# Patient Record
Sex: FEMALE | Race: WHITE | NOT HISPANIC OR LATINO | Employment: OTHER | URBAN - NONMETROPOLITAN AREA
[De-identification: names, ages, dates, MRNs, and addresses within clinical notes are randomized per-mention and may not be internally consistent; named-entity substitution may affect disease eponyms.]

---

## 2017-01-03 ENCOUNTER — OFFICE VISIT (OUTPATIENT)
Dept: OBSTETRICS AND GYNECOLOGY | Facility: CLINIC | Age: 72
End: 2017-01-03

## 2017-01-03 VITALS
WEIGHT: 109 LBS | BODY MASS INDEX: 21.4 KG/M2 | SYSTOLIC BLOOD PRESSURE: 124 MMHG | HEIGHT: 60 IN | DIASTOLIC BLOOD PRESSURE: 72 MMHG

## 2017-01-03 DIAGNOSIS — Z78.9 NONSMOKER: ICD-10-CM

## 2017-01-03 DIAGNOSIS — Z09 S/P GYNECOLOGICAL SURGERY, FOLLOW-UP EXAM: Primary | ICD-10-CM

## 2017-01-03 PROCEDURE — 99024 POSTOP FOLLOW-UP VISIT: CPT | Performed by: OBSTETRICS & GYNECOLOGY

## 2017-01-03 NOTE — MR AVS SNAPSHOT
Sarah Espinoza   1/3/2017 11:30 AM   Office Visit    Dept Phone:  [phone]   Encounter #:  [id number]    Provider:  Danii Patricia MD   Department:  [facility] OB GYN                Your Full Care Plan              Your Updated Medication List          This list is accurate as of: 1/3/17 11:44 AM.  Always use your most recent med list.                ALEVE PO       aspirin 81 MG chewable tablet   Chew 1 tablet Daily.       atorvastatin 20 MG tablet   Commonly known as:  LIPITOR   Take 1 tablet by mouth Every Night. Additional refills from Dr. Hernandez       Biotin 1000 MCG tablet       COLLAGEN PO       HYDROcodone-acetaminophen 5-325 MG per tablet   Commonly known as:  NORCO   Take 1-2 tablets by mouth Every 6 (Six) Hours As Needed for moderate pain (4-6).       lisinopril-hydrochlorothiazide 20-25 MG per tablet   Commonly known as:  PRINZIDE,ZESTORETIC       loratadine 10 MG tablet   Commonly known as:  CLARITIN       metoprolol succinate XL 50 MG 24 hr tablet   Commonly known as:  TOPROL-XL       ondansetron 4 MG tablet   Commonly known as:  ZOFRAN   Take 1 tablet by mouth Every 6 (Six) Hours As Needed for nausea.       promethazine 12.5 MG tablet   Commonly known as:  PHENERGAN   Take 1 tablet by mouth Every 6 (Six) Hours As Needed for nausea or vomiting.       vitamin D3 5000 UNITS capsule capsule               You Were Diagnosed With        Codes Comments    S/P gynecological surgery, follow-up exam    -  Primary ICD-10-CM: Z09  ICD-9-CM: V67.09     Nonsmoker     ICD-10-CM: Z78.9  ICD-9-CM: V49.89       Instructions     None    Patient Instructions History      Upcoming Appointments     Visit Type Date Time Department    OFFICE VISIT 1/3/2017 11:30 AM [facility]     PAD CARD NM INJ 1/9/2017  8:45 AM  PAD CARDIOLOGY    [facility] PAD CARD NM SCAN 1/9/2017 10:00 AM  PAD CARDIOLOGY     PAD CARD NM STRESS 1/9/2017 10:45 AM  PAD CARDIOLOGY     PAD CARD NM  SCAN 2017 11:30 AM  [facility] CARDIOLOGY    HOSPITAL FOLLOW UP 2017  1:30 PM [facility] PAD    OFFICE VISIT 2017  9:30 AM [facility] [place]    FOLLOW UP 10/9/2017  8:30 AM [facility]ART [facility] PAD      MyChart Signup     [facility] [employer] allows you to send messages to your doctor, view your test results, renew your prescriptions, schedule appointments, and more. To sign up, go to Promolta and click on the Sign Up Now link in the New User? box. Enter your [employer] Activation Code exactly as it appears below along with the last four digits of your Social Security Number and your Date of Birth () to complete the sign-up process. If you do not sign up before the expiration date, you must request a new code.    [employer] Activation Code: [email or web address]  Expires: 2017 11:44 AM    If you have questions, you can email Sydney [email or web address] or call [phone] to talk to our [employer] staff. Remember, [employer] is NOT to be used for urgent needs. For medical emergencies, dial 911.               Other Info from Your Visit           Your Appointments     2017  8:45 AM CST   (Arrive by 8:15 AM CST)    PAD CARD NM INJ with PAD NM INJ ROOM   [facility] CARDIOLOGY ([place])    [address] [place] [id number] [phone]           No food or drink after midnight prior to your test. May have only a sip of water with allowed medications. (Please see list below of meds not allowed) No caffeine or chocolate 24 hours prior to you test. (this includes coffee, tea, soda, all decaffeinated beverages, cappuccino flavorings, noooz or vivarian, diet medications, excedrin, anacin or any energy drinks) Wear comfortable clothing and walking shoes. Bring your insurance cards with you. Bring all medications in their original bottles. If you are diabetic, do not take your diabetic medications after consulting with your primary care physician. If you take insulin, only  "take half the dose after consulting with your primary care physician. Please hold the following medications for 48 hours prior to your test after consulting with your primary care physician (acebutolo, aggrenox, atenolol, bisoprolol, betaxolol, betapace, calan, cardizem, carvadilol, coreg, corgard, corzide, dilacor xr, diltiazem, inderal, inderal la, isoptin, karlone, labetolol, levatol, nadolol, normodyne, penbutolol, pindolol, propanolol, sectral, sotalol, tenoretic, tiazic, timolol, bystolic, toprol xl, lopressor, metoprolol, trandata, verapamil, verelan, visken, zebeta, zisc, theophylline, elva-dur, sio-phyline, qulbron-t, primatene, persantine, dipyrimadiole)       To allow time for registration            Jan 09, 2017 10:00 AM CST    PAD CARD NM SCAN with PAD NM SCAN ROOM   [facility] CARDIOLOGY [address] [address] [phone]            Jan 09, 2017 10:45 AM [facility] PAD CARD NM STRESS [place] with PAD STRESS LAB 1   [address] [address] [phone]            Jan 09, 2017 11:30 AM [facility] PAD CARD NM SCAN with PAD NM SCAN ROOM   [address] [address] [phone]            Jan 23, 2017  1:30 PM CST   Hospital Follow Up with Anant Hernandez MD   [facility]ART [facility] (--)    [address] [place] [address] [phone]              Allergies     Percodan [Oxycodone-aspirin]  Hallucinations    Zofran [Ondansetron Hcl]        Reason for Visit     Post-op pt here today for 3 week post op vaginal hysterectomy and AR/OH. pt voices that 2 days after she came home from hospital pt sodium and potassium was low and was admitted. pt voices that she is feeling well since surgery.       Vital Signs     Blood Pressure Height Weight Body Mass Index Smoking Status       124/72 60\" (152.4 cm) 109 " lb (49.4 kg) 21.29 kg/m2 Never Smoker       Problems and Diagnoses Noted     S/P gynecological surgery, follow-up exam    -  Primary    Nonsmoker

## 2017-01-03 NOTE — PROGRESS NOTES
"Subjective   Chief Complaint   Patient presents with   • Post-op     pt here today for 3 week post op vaginal hysterectomy and AR/OR. pt voices that 2 days after she came home from hospital pt sodium and potassium was low and was admitted. pt voices that she is feeling well since surgery.      Sarah Espinoza is a 71 y.o. year old No obstetric history on file. presenting to be seen for her post-operative visit.  She had a repair of cystocele (AR), repair of rectocele (OR) and TVH.  Currently she reports no problems with eating, bowel movements, voiding, or wound drainage and pain is well controlled.  No pelvic pressure, bladder and bowel function normal.    No Additional Complaints Reported    The following portions of the patient's history were reviewed and updated as appropriate:problem list, current medications and allergies    Review of Systems      Objective   Visit Vitals   • /72   • Ht 60\" (152.4 cm)   • Wt 109 lb (49.4 kg)   • BMI 21.29 kg/m2       General:  well developed; well nourished  no acute distress   Abdomen: soft, non-tender; no masses   Pelvis: Clinical staff was present for exam  :  urethral meatus normal and All sutures intact, no erythema or drainage          Assessment   1. Pt is 2 weeks s/p repair of cystocele (AR), repair of rectocele (OR) and TVH  2. Hospitalization last week for low K+, but feeling better now     Plan   1. RTO in 4 weeks, healing appropriately    No orders of the defined types were placed in this encounter.         This note was electronically signed.    Danii Patricia MD  January 3, 2017  "

## 2017-01-09 ENCOUNTER — HOSPITAL ENCOUNTER (OUTPATIENT)
Dept: CARDIOLOGY | Facility: HOSPITAL | Age: 72
Discharge: HOME | End: 2017-01-09
Admitting: NURSE PRACTITIONER

## 2017-01-09 ENCOUNTER — HOSPITAL ENCOUNTER (OUTPATIENT)
Dept: CARDIOLOGY | Facility: HOSPITAL | Age: 72
Discharge: HOME | End: 2017-01-09

## 2017-01-09 VITALS
WEIGHT: 108 LBS | HEIGHT: 60 IN | HEART RATE: 92 BPM | DIASTOLIC BLOOD PRESSURE: 58 MMHG | SYSTOLIC BLOOD PRESSURE: 103 MMHG | BODY MASS INDEX: 21.2 KG/M2

## 2017-01-09 DIAGNOSIS — I25.119 CORONARY ARTERY DISEASE INVOLVING NATIVE CORONARY ARTERY OF NATIVE HEART WITH ANGINA PECTORIS (HCC): ICD-10-CM

## 2017-01-09 DIAGNOSIS — R00.2 HEART PALPITATIONS: ICD-10-CM

## 2017-01-09 PROCEDURE — 25010000002 AMINOPHYLLINE PER 250 MG: Performed by: INTERNAL MEDICINE

## 2017-01-09 PROCEDURE — A9500 TC99M SESTAMIBI: HCPCS | Performed by: NURSE PRACTITIONER

## 2017-01-09 PROCEDURE — 78452 HT MUSCLE IMAGE SPECT MULT: CPT

## 2017-01-09 PROCEDURE — 78452 HT MUSCLE IMAGE SPECT MULT: CPT | Performed by: INTERNAL MEDICINE

## 2017-01-09 PROCEDURE — 25010000002 REGADENOSON 0.4 MG/5ML SOLUTION: Performed by: INTERNAL MEDICINE

## 2017-01-09 PROCEDURE — 93017 CV STRESS TEST TRACING ONLY: CPT

## 2017-01-09 PROCEDURE — 0 TECHNETIUM SESTAMIBI: Performed by: NURSE PRACTITIONER

## 2017-01-09 PROCEDURE — 93018 CV STRESS TEST I&R ONLY: CPT | Performed by: INTERNAL MEDICINE

## 2017-01-09 RX ORDER — AMINOPHYLLINE DIHYDRATE 25 MG/ML
100 INJECTION, SOLUTION INTRAVENOUS ONCE
Status: COMPLETED | OUTPATIENT
Start: 2017-01-09 | End: 2017-01-09

## 2017-01-09 RX ADMIN — REGADENOSON 0.4 MG: 0.08 INJECTION, SOLUTION INTRAVENOUS at 09:25

## 2017-01-09 RX ADMIN — Medication 1 DOSE: at 08:22

## 2017-01-09 RX ADMIN — AMINOPHYLLINE 100 MG: 25 INJECTION, SOLUTION INTRAVENOUS at 09:31

## 2017-01-10 LAB
BH CV STRESS BP STAGE 1: NORMAL
BH CV STRESS COMMENTS STAGE 1: NORMAL
BH CV STRESS DOSE REGADENOSON STAGE 1: 0.4
BH CV STRESS DURATION MIN STAGE 1: 0
BH CV STRESS DURATION SEC STAGE 1: 10
BH CV STRESS HR STAGE 1: 120
BH CV STRESS PROTOCOL 1: NORMAL
BH CV STRESS RECOVERY BP: NORMAL MMHG
BH CV STRESS RECOVERY HR: 103 BPM
BH CV STRESS STAGE 1: 1
LV EF NUC BP: 89 %
MAXIMAL PREDICTED HEART RATE: 151 BPM
PERCENT MAX PREDICTED HR: 79.47 %
STRESS BASELINE BP: NORMAL MMHG
STRESS BASELINE HR: 99 BPM
STRESS PERCENT HR: 93 %
STRESS POST EXERCISE DUR SEC: 10 SEC
STRESS POST PEAK BP: NORMAL MMHG
STRESS POST PEAK HR: 120 BPM
STRESS TARGET HR: 128 BPM

## 2017-01-23 ENCOUNTER — OFFICE VISIT (OUTPATIENT)
Dept: CARDIOLOGY | Facility: CLINIC | Age: 72
End: 2017-01-23

## 2017-01-23 VITALS
HEART RATE: 83 BPM | WEIGHT: 108.4 LBS | DIASTOLIC BLOOD PRESSURE: 65 MMHG | SYSTOLIC BLOOD PRESSURE: 117 MMHG | BODY MASS INDEX: 21.28 KG/M2 | HEIGHT: 60 IN

## 2017-01-23 DIAGNOSIS — I25.119 CORONARY ARTERY DISEASE INVOLVING NATIVE CORONARY ARTERY OF NATIVE HEART WITH ANGINA PECTORIS (HCC): Primary | ICD-10-CM

## 2017-01-23 DIAGNOSIS — I10 ESSENTIAL HYPERTENSION: ICD-10-CM

## 2017-01-23 PROBLEM — I27.20 PULMONARY HYPERTENSION (HCC): Status: ACTIVE | Noted: 2017-01-23

## 2017-01-23 PROCEDURE — 99213 OFFICE O/P EST LOW 20 MIN: CPT | Performed by: INTERNAL MEDICINE

## 2017-01-23 PROCEDURE — 93000 ELECTROCARDIOGRAM COMPLETE: CPT | Performed by: INTERNAL MEDICINE

## 2017-01-23 NOTE — PROGRESS NOTES
Reason for Visit: cardiovascular follow up.    HPI:  Sarah Espinoza is a 71 y.o. female is here today for hospital follow-up.  She was admitted from 12/19/2016 until 12/22/2016 with hyponatremia, hypokalemia, acute blood loss anemia, and hypertension.  An outpatient nuclear stress was ordered which was done on 01/09/2017 showed no evidence of ischemia.  She continues to deny any chest pain since discharge.  Her strength has slowly been returning.  She denies     Previous Cardiac Testing and Procedures:  - Echocardiogram (12/21/2016) EF D5 percent, grade 1 diastolic dysfunction, RVSP 45-55 mmHg, normal RV size and function  - Nuclear stress (01/09/2017) normal myocardial perfusion with no evidence of ischemia, EF 70%  - Lipid panel (12/20/16) 113/26/69/103    Patient Active Problem List   Diagnosis   • Essential hypertension   • Lupus   • Arthritis   • Female bladder prolapse   • Heart palpitations   • Seasonal allergies   • Uterine prolapse   • Hyponatremia   • CAD (coronary artery disease)   • Hypovolemia   • Anemia due to blood loss, acute   • Pulmonary hypertension       Social History   Substance Use Topics   • Smoking status: Never Smoker   • Smokeless tobacco: Never Used   • Alcohol use No       Family History   Problem Relation Age of Onset   • Heart disease Mother    • No Known Problems Father    • No Known Problems Sister    • Kidney cancer Brother    • Leukemia Sister        The following portions of the patient's history were reviewed and updated as appropriate: allergies, current medications, past family history, past medical history, past social history, past surgical history and problem list.      Current Outpatient Prescriptions:   •  aspirin 81 MG chewable tablet, Chew 1 tablet Daily., Disp: 30 tablet, Rfl: 0  •  Biotin 1000 MCG tablet, Take 1,000 mcg by mouth 3 (Three) Times a Day., Disp: , Rfl:   •  Cholecalciferol (VITAMIN D3) 5000 UNITS capsule capsule, Take 5,000 Units by mouth Daily.,  "Disp: , Rfl:   •  lisinopril-hydrochlorothiazide (PRINZIDE,ZESTORETIC) 20-25 MG per tablet, Take 1 tablet by mouth Daily., Disp: , Rfl:   •  metoprolol succinate XL (TOPROL-XL) 50 MG 24 hr tablet, Take 50 mg by mouth Daily., Disp: , Rfl:   •  COLLAGEN PO, Take 1,000 mg by mouth Daily., Disp: , Rfl:   •  loratadine (CLARITIN) 10 MG tablet, Take 10 mg by mouth Daily., Disp: , Rfl:   •  Naproxen Sodium (ALEVE PO), Take  by mouth As Needed (MILD ARTHRITIC PAIN)., Disp: , Rfl:   •  promethazine (PHENERGAN) 12.5 MG tablet, Take 1 tablet by mouth Every 6 (Six) Hours As Needed for nausea or vomiting., Disp: 30 tablet, Rfl: 0    Review of Systems   Constitution: Negative for chills and fever.   Cardiovascular: Negative for chest pain and paroxysmal nocturnal dyspnea.   Respiratory: Negative for cough and shortness of breath.    Skin: Negative for rash.   Gastrointestinal: Negative for abdominal pain and heartburn.   Neurological: Negative for dizziness and numbness.       Objective   Visit Vitals   • /65 (BP Location: Right arm, Patient Position: Sitting, Cuff Size: Adult)   • Pulse 83   • Ht 60\" (152.4 cm)   • Wt 108 lb 6.4 oz (49.2 kg)   • BMI 21.17 kg/m2     Physical Exam   Constitutional: She is oriented to person, place, and time. She appears well-developed and well-nourished.   HENT:   Head: Normocephalic and atraumatic.   Cardiovascular: Normal rate, regular rhythm and normal heart sounds.    No murmur heard.  Pulmonary/Chest: Effort normal and breath sounds normal.   Musculoskeletal: She exhibits no edema.   Neurological: She is alert and oriented to person, place, and time.   Skin: Skin is warm and dry.   Psychiatric: She has a normal mood and affect.       ECG 12 Lead  Date/Time: 1/23/2017 2:01 PM  Performed by: DAVID POLO  Authorized by: DAVID POLO   Comparison: compared with previous ECG from 12/19/2016  Comparison to previous ECG: PVCs are no longer present  Rhythm: sinus rhythm  Rate: " normal  Clinical impression: normal ECG              ICD-10-CM ICD-9-CM   1. Coronary artery disease involving native coronary artery of native heart with angina pectoris I25.119 414.01     413.9   2. Essential hypertension I10 401.9         Assessment/Plan:  1.  CAD: Mild troponin elevation noted during recent admission for anemia.  Follow-up nuclear stress was negative for ischemia.  Currently managed on aspirin, atorvastatin, and metoprolol.  Will stop atorvastatin given the negative stress test and normal lipid panel.     2.  Essential hypertension: Blood pressure is well controlled on current medical therapy.    3.  Pulmonary hypertension: RVSP 45-55 mmHg on recent echo.  Possibly due to Lupus.  Will continue to monitor.

## 2017-01-23 NOTE — LETTER
January 23, 2017     HARRIS Valladares  [address] [place] [address]    Patient: Sarah Espinoza   YOB: 1945   Date of Visit: 1/23/2017       Dear HARRIS Mendoza:    Sarah Espinoza was in my office today. Below is a copy of my note.    If you have questions, please do not hesitate to call me. I look forward to following [place] along with you.         Sincerely,        Anant Hernandez MD        CC: No Recipients      Reason for Visit: cardiovascular follow up.    HPI:  Sarah Espinoza is a 71 y.o. female is here today for hospital follow-up.  She was admitted from 12/19/2016 until 12/22/2016 with hyponatremia, hypokalemia, acute blood loss anemia, and hypertension.  An outpatient nuclear stress was ordered which was done on 01/09/2017 showed no evidence of ischemia.  She continues to deny any chest pain since discharge.  Her strength has slowly been returning.  She denies     Previous Cardiac Testing and Procedures:  - Echocardiogram (12/21/2016) EF D5 percent, grade 1 diastolic dysfunction, RVSP 45-55 mmHg, normal RV size and function  - Nuclear stress (01/09/2017) normal myocardial perfusion with no evidence of ischemia, EF 70%  - Lipid panel (12/20/16) 113/26/69/103    Patient Active Problem List   Diagnosis   • Essential hypertension   • Lupus   • Arthritis   • Female bladder prolapse   • Heart palpitations   • Seasonal allergies   • Uterine prolapse   • Hyponatremia   • CAD (coronary artery disease)   • Hypovolemia   • Anemia due to blood loss, acute   • Pulmonary hypertension       Social History   Substance Use Topics   • Smoking status: Never Smoker   • Smokeless tobacco: Never Used   • Alcohol use No       Family History   Problem Relation Age of Onset   • Heart disease Mother    • No Known Problems Father    • No Known Problems Sister    • Kidney cancer Brother    • Leukemia Sister        The following portions of the patient's history were reviewed and updated  "as appropriate: allergies, current medications, past family history, past medical history, past social history, past surgical history and problem list.      Current Outpatient Prescriptions:   •  aspirin 81 MG chewable tablet, Chew 1 tablet Daily., Disp: 30 tablet, Rfl: 0  •  Biotin 1000 MCG tablet, Take 1,000 mcg by mouth 3 (Three) Times a Day., Disp: , Rfl:   •  Cholecalciferol (VITAMIN D3) 5000 UNITS capsule capsule, Take 5,000 Units by mouth Daily., Disp: , Rfl:   •  lisinopril-hydrochlorothiazide (PRINZIDE,ZESTORETIC) 20-25 MG per tablet, Take 1 tablet by mouth Daily., Disp: , Rfl:   •  metoprolol succinate XL (TOPROL-XL) 50 MG 24 hr tablet, Take 50 mg by mouth Daily., Disp: , Rfl:   •  COLLAGEN PO, Take 1,000 mg by mouth Daily., Disp: , Rfl:   •  loratadine (CLARITIN) 10 MG tablet, Take 10 mg by mouth Daily., Disp: , Rfl:   •  Naproxen Sodium (ALEVE PO), Take  by mouth As Needed (MILD ARTHRITIC PAIN)., Disp: , Rfl:   •  promethazine (PHENERGAN) 12.5 MG tablet, Take 1 tablet by mouth Every 6 (Six) Hours As Needed for nausea or vomiting., Disp: 30 tablet, Rfl: 0    Review of Systems   Constitution: Negative for chills and fever.   Cardiovascular: Negative for chest pain and paroxysmal nocturnal dyspnea.   Respiratory: Negative for cough and shortness of breath.    Skin: Negative for rash.   Gastrointestinal: Negative for abdominal pain and heartburn.   Neurological: Negative for dizziness and numbness.       Objective   Visit Vitals   • /65 (BP Location: Right arm, Patient Position: Sitting, Cuff Size: Adult)   • Pulse 83   • Ht 60\" (152.4 cm)   • Wt 108 lb 6.4 oz (49.2 kg)   • BMI 21.17 kg/m2     Physical Exam   Constitutional: She is oriented to person, place, and time. She appears well-developed and well-nourished.   HENT:   Head: Normocephalic and atraumatic.   Cardiovascular: Normal rate, regular rhythm and normal heart sounds.    No murmur heard.  Pulmonary/Chest: Effort normal and breath sounds " normal.   Musculoskeletal: She exhibits no edema.   Neurological: She is alert and oriented to person, place, and time.   Skin: Skin is warm and dry.   Psychiatric: She has a normal mood and affect.       ECG 12 Lead  Date/Time: 1/23/2017 2:01 PM  Performed by: DAVID POLO  Authorized by: DAVID POLO   Comparison: compared with previous ECG from 12/19/2016  Comparison to previous ECG: PVCs are no longer present  Rhythm: sinus rhythm  Rate: normal  Clinical impression: normal ECG              ICD-10-CM ICD-9-CM   1. Coronary artery disease involving native coronary artery of native heart with angina pectoris I25.119 414.01     413.9   2. Essential hypertension I10 401.9         Assessment/Plan:  1.  CAD: Mild troponin elevation noted during recent admission for anemia.  Follow-up nuclear stress was negative for ischemia.  Currently managed on aspirin, atorvastatin, and metoprolol.  Will stop atorvastatin given the negative stress test and normal lipid panel.     2.  Essential hypertension: Blood pressure is well controlled on current medical therapy.    3.  Pulmonary hypertension: RVSP 45-55 mmHg on recent echo.  Possibly due to Lupus.  Will continue to monitor.

## 2017-01-23 NOTE — MR AVS SNAPSHOT
[place] KELSEY Espinoza   1/23/2017 1:30 PM   Office Visit    Dept Phone:  [phone]   Encounter #:  [id number]    Provider:  Anant Hernandez MD   Department:  [facility]                Your Full Care Plan              Today's Medication Changes          These changes are accurate as of: 1/23/17  2:10 PM.  If you have any questions, ask your nurse or doctor.               Stop taking medication(s)listed here:     atorvastatin 20 MG tablet   Commonly known as:  LIPITOR   Stopped by:  Anant Hernandez MD           HYDROcodone-acetaminophen 5-325 MG per tablet   Commonly known as:  NORCO   Stopped by:  Anant Hernandez MD           ondansetron 4 MG tablet   Commonly known as:  ZOFRAN   Stopped by:  Anant Hernandez MD                      Your Updated Medication List          This list is accurate as of: 1/23/17  2:10 PM.  Always use your most recent med list.                ALEVE PO       aspirin 81 MG chewable tablet   Chew 1 tablet Daily.       Biotin 1000 MCG tablet       COLLAGEN PO       lisinopril-hydrochlorothiazide 20-25 MG per tablet   Commonly known as:  PRINZIDE,ZESTORETIC       loratadine 10 MG tablet   Commonly known as:  CLARITIN       metoprolol succinate XL 50 MG 24 hr tablet   Commonly known as:  TOPROL-XL       promethazine 12.5 MG tablet   Commonly known as:  PHENERGAN   Take 1 tablet by mouth Every 6 (Six) Hours As Needed for nausea or vomiting.       vitamin D3 5000 UNITS capsule capsule               We Performed the Following     ECG 12 Lead       You Were Diagnosed With        Codes Comments    Coronary artery disease involving native coronary artery of native heart with angina pectoris    -  Primary ICD-10-CM: I25.119  ICD-9-CM: 414.01, 413.9     Essential hypertension     ICD-10-CM: I10  ICD-9-CM: 401.9       Instructions     None    Patient Instructions History      Upcoming Appointments     Visit Type Date Time Department    HOSPITAL FOLLOW  "UP 2017  1:30 PM [facility]    OFFICE VISIT 2017  9:30 AM [facility]    FOLLOW UP 10/9/2017  8:30 AM [facility] P[facility]      MyChart Signup     [facility] [employer] allows you to send messages to your doctor, view your test results, renew your prescriptions, schedule appointments, and more. To sign up, go to aiHit and click on the Sign Up Now link in the New User? box. Enter your [employer] Activation Code exactly as it appears below along with the last four digits of your Social Security Number and your Date of Birth () to complete the sign-up process. If you do not sign up before the expiration date, you must request a new code.    [employer] Activation Code: [email or web address]  Expires: 2017  2:09 PM    If you have questions, you can email [email or web address] or call [phone] to talk to our [employer] staff. Remember, [employer] is NOT to be used for urgent needs. For medical emergencies, dial 911.               Other Info from Your Visit           Your Appointments     2017  9:30 AM CST   Office Visit with Danii Patricia MD   [facility] OB [facility] (--)    [address]  [place] [address] [phone]           Arrive 15 minutes prior to appointment.            Oct 09, 2017  8:30 AM CDT   Follow Up with Anant Hernandez MD   [facility] (--)    [address]  [place] [facility] [facility] [id number] [phone]           Arrive 15 minutes prior to appointment.              Allergies     Percodan [Oxycodone-aspirin]  Hallucinations    Zofran [Ondansetron Hcl]        Reason for Visit     Coronary Artery Disease 4 wk Dc     Hypertension     Results Stress Test. done 2017      Vital Signs     Blood Pressure Pulse Height Weight Body Mass Index Smoking Status    117/65 (BP Location: Right arm, Patient Position: Sitting, Cuff Size: Adult) 83 60\" (152.4 cm) 108 lb 6.4 oz (49.2 kg) 21.17 kg/m2 Never " Smoker      Problems and Diagnoses Noted     Coronary heart disease    High blood pressure    Pulmonary hypertension      Results     ECG 12 Lead

## 2017-01-31 ENCOUNTER — OFFICE VISIT (OUTPATIENT)
Dept: OBSTETRICS AND GYNECOLOGY | Facility: CLINIC | Age: 72
End: 2017-01-31

## 2017-01-31 VITALS
HEIGHT: 60 IN | SYSTOLIC BLOOD PRESSURE: 115 MMHG | WEIGHT: 106 LBS | BODY MASS INDEX: 20.81 KG/M2 | DIASTOLIC BLOOD PRESSURE: 62 MMHG

## 2017-01-31 DIAGNOSIS — Z09 S/P GYNECOLOGICAL SURGERY, FOLLOW-UP EXAM: Primary | ICD-10-CM

## 2017-01-31 DIAGNOSIS — Z78.9 NONSMOKER: ICD-10-CM

## 2017-01-31 PROCEDURE — 99024 POSTOP FOLLOW-UP VISIT: CPT | Performed by: OBSTETRICS & GYNECOLOGY

## 2017-01-31 NOTE — PROGRESS NOTES
"Subjective   Chief Complaint   Patient presents with   • Post-op     pt here today for 8 week post op vaginal hysterectomy and anterior and posterior repair. pt says that she is doing good and has no concerns.      Sarah Espinoza is a 71 y.o. year old No obstetric history on file. presenting to be seen for her post-operative visit.  She had a repair of cystocele (AR), repair of rectocele (AZ) and TVH.  Currently she reports no pain.  Bladder and bowel function normal.    No Additional Complaints Reported    The following portions of the patient's history were reviewed and updated as appropriate:current medications, allergies and past surgical history    Review of Systems   Respiratory: Negative for shortness of breath.    Cardiovascular: Negative for chest pain.   Gastrointestinal: Negative for abdominal pain, constipation and diarrhea.   Genitourinary: Negative for difficulty urinating, dysuria, pelvic pain and vaginal bleeding.         Objective   Visit Vitals   • /62   • Ht 60\" (152.4 cm)   • Wt 106 lb (48.1 kg)   • BMI 20.7 kg/m2       General:  well developed; well nourished  no acute distress   Abdomen: soft, non-tender; no masses   Pelvis: Clinical staff was present for exam  Vaginal:  normal pink mucosa without prolapse or lesions.  Sutures all healed.  Uterus and Cx surgically absent.          Assessment   1. Pt is 6 weeks s/p repair of cystocele (AR), repair of rectocele (AZ) and TVH     Plan   1. OK to resume normal activity    No orders of the defined types were placed in this encounter.         This note was electronically signed.    Danii Patricia MD  January 31, 2017  "

## 2017-01-31 NOTE — MR AVS SNAPSHOT
Sarah COLE Alexis   2017 9:30 AM   Office Visit    Dept Phone:  [phone]   Encounter #:  [id number]    Provider:  Danii Patricia MD   Department:  [facility] MED[facility] OB GYN                Your Full Care Plan              Your Updated Medication List          This list is accurate as of: 17  9:46 AM.  Always use your most recent med list.                ALEVE PO       aspirin 81 MG chewable tablet   Chew 1 tablet Daily.       Biotin 1000 MCG tablet       COLLAGEN PO       lisinopril-hydrochlorothiazide 20-25 MG per tablet   Commonly known as:  PRINZIDE,ZESTORETIC       loratadine 10 MG tablet   Commonly known as:  CLARITIN       metoprolol succinate XL 50 MG 24 hr tablet   Commonly known as:  TOPROL-XL       promethazine 12.5 MG tablet   Commonly known as:  PHENERGAN   Take 1 tablet by mouth Every 6 (Six) Hours As Needed for nausea or vomiting.       vitamin D3 5000 UNITS capsule capsule               You Were Diagnosed With        Codes Comments    S/P gynecological surgery, follow-up exam    -  Primary ICD-10-CM: Z09  ICD-9-CM: V67.09 6 weeks s/p TVH with AR/NY.  completely healed.    Nonsmoker     ICD-10-CM: Z78.9  ICD-9-CM: V49.89       Instructions     None    Patient Instructions History      Upcoming Appointments     Visit Type Date Time Department    OFFICE VISIT 2017  9:30 AM [facility] [place] [facility]\""[facility]    FOLLOW UP 10/9/2017  8:30 AM [facility] PAD      MyChart Signup     [facility] [employer] allows you to send messages to your doctor, view your test results, renew your prescriptions, schedule appointments, and more. To sign up, go to [email or web address] and click on the Sign Up Now link in the New User? box. Enter your [employer] Activation Code exactly as it appears below along with the last four digits of your Social Security Number and your Date of Birth () to complete the sign-up process. If you do not sign up before the expiration date, you  "must request a new code.    [employer] Activation Code: [email or web address]  Expires: 2/6/2017  2:09 PM    If you have questions, you can email [email or web address] or call [phone] to talk to our [employer] staff. Remember, [employer] is NOT to be used for urgent needs. For medical emergencies, dial 911.               Other Info from Your Visit           Your Appointments     Oct 09, 2017  8:30 AM CDT   Follow Up with Anant Hernandez MD   [facility] (--)    [address] [place] [address] [address]  [facility] [address] [phone]           Arrive 15 minutes prior to appointment.              Allergies     Percodan [Oxycodone-aspirin]  Hallucinations    Zofran [Ondansetron Hcl]        Reason for Visit     Post-op pt here today for 8 week post op vaginal hysterectomy and anterior and posterior repair. pt says that she is doing good and has no concerns.       Vital Signs     Blood Pressure Height Weight Body Mass Index Smoking Status       115/62 60\" (152.4 cm) 106 lb (48.1 kg) 20.7 kg/m2 Never Smoker       Problems and Diagnoses Noted     S/P gynecological surgery, follow-up exam    -  Primary    Nonsmoker            "

## 2017-04-20 RX ORDER — LISINOPRIL AND HYDROCHLOROTHIAZIDE 25; 20 MG/1; MG/1
1 TABLET ORAL DAILY
Qty: 90 TABLET | Refills: 1 | Status: SHIPPED | OUTPATIENT
Start: 2017-04-20 | End: 2018-10-15 | Stop reason: SDUPTHER

## 2017-04-27 ENCOUNTER — TRANSCRIBE ORDERS (OUTPATIENT)
Dept: ADMINISTRATIVE | Facility: HOSPITAL | Age: 72
End: 2017-04-27

## 2017-04-27 DIAGNOSIS — Z12.31 ENCOUNTER FOR SCREENING MAMMOGRAM FOR MALIGNANT NEOPLASM OF BREAST: Primary | ICD-10-CM

## 2017-04-27 DIAGNOSIS — D69.6 THROMBOCYTOPENIA (HCC): ICD-10-CM

## 2017-04-28 ENCOUNTER — HOSPITAL ENCOUNTER (OUTPATIENT)
Dept: ULTRASOUND IMAGING | Facility: HOSPITAL | Age: 72
Discharge: HOME | End: 2017-04-28
Attending: INTERNAL MEDICINE

## 2017-04-28 ENCOUNTER — HOSPITAL ENCOUNTER (OUTPATIENT)
Dept: MAMMOGRAPHY | Facility: HOSPITAL | Age: 72
Discharge: HOME | End: 2017-04-28
Attending: INTERNAL MEDICINE | Admitting: INTERNAL MEDICINE

## 2017-04-28 DIAGNOSIS — D69.6 THROMBOCYTOPENIA (HCC): ICD-10-CM

## 2017-04-28 DIAGNOSIS — Z12.31 ENCOUNTER FOR SCREENING MAMMOGRAM FOR MALIGNANT NEOPLASM OF BREAST: ICD-10-CM

## 2017-04-28 PROCEDURE — 76705 ECHO EXAM OF ABDOMEN: CPT

## 2017-04-28 PROCEDURE — G0202 SCR MAMMO BI INCL CAD: HCPCS

## 2017-04-28 PROCEDURE — 77063 BREAST TOMOSYNTHESIS BI: CPT

## 2017-10-09 ENCOUNTER — OFFICE VISIT (OUTPATIENT)
Dept: CARDIOLOGY | Facility: CLINIC | Age: 72
End: 2017-10-09

## 2017-10-09 VITALS
HEIGHT: 60 IN | OXYGEN SATURATION: 100 % | BODY MASS INDEX: 23.13 KG/M2 | HEART RATE: 68 BPM | SYSTOLIC BLOOD PRESSURE: 110 MMHG | DIASTOLIC BLOOD PRESSURE: 72 MMHG | WEIGHT: 117.8 LBS

## 2017-10-09 DIAGNOSIS — R00.2 HEART PALPITATIONS: ICD-10-CM

## 2017-10-09 DIAGNOSIS — I10 ESSENTIAL HYPERTENSION: Primary | ICD-10-CM

## 2017-10-09 DIAGNOSIS — I27.20 PULMONARY HYPERTENSION (HCC): ICD-10-CM

## 2017-10-09 PROBLEM — M35.00 SJOGREN'S DISEASE: Status: ACTIVE | Noted: 2017-10-09

## 2017-10-09 PROCEDURE — 93000 ELECTROCARDIOGRAM COMPLETE: CPT | Performed by: INTERNAL MEDICINE

## 2017-10-09 PROCEDURE — 99214 OFFICE O/P EST MOD 30 MIN: CPT | Performed by: INTERNAL MEDICINE

## 2017-10-09 RX ORDER — HYDROXYCHLOROQUINE SULFATE 200 MG/1
TABLET, FILM COATED ORAL DAILY
COMMUNITY

## 2017-10-09 RX ORDER — METOPROLOL SUCCINATE 50 MG/1
75 TABLET, EXTENDED RELEASE ORAL DAILY
Qty: 135 TABLET | Refills: 3 | Status: SHIPPED | OUTPATIENT
Start: 2017-10-09 | End: 2018-10-15 | Stop reason: SDUPTHER

## 2017-10-09 RX ORDER — PREDNISONE 50 MG/1
50 TABLET ORAL DAILY
COMMUNITY
End: 2018-10-15

## 2017-10-09 NOTE — PROGRESS NOTES
Reason for Visit: cardiovascular follow up.    HPI:  Sarah Espinoza is a 72 y.o. female is here today for follow-up.  She has been doing well from a heart standpoint.  She denies any chest pain, palpitations, dizziness, syncope.  Has been having low platelets.  Her aspirin has been discontinued.  There was low as 57 recently.  Follows with gynecology oncology.  Also follows with a rheumatologist and feels that her rheumatologic issues may be contributing to her thrombocytopenia.    Previous Cardiac Testing and Procedures:  - Echocardiogram (12/21/2016) EF 55%, grade 1 diastolic dysfunction, RVSP 45-55 mmHg, normal RV size and function  - Nuclear stress (01/09/2017) normal myocardial perfusion with no evidence of ischemia, EF 70%  - Lipid panel (12/20/16) 113/26/69/103    Patient Active Problem List   Diagnosis   • Essential hypertension   • Lupus   • Arthritis   • Female bladder prolapse   • Heart palpitations   • Seasonal allergies   • Uterine prolapse   • Hyponatremia   • Hypovolemia   • Anemia due to blood loss, acute   • Pulmonary hypertension   • Sjogren's disease       Social History   Substance Use Topics   • Smoking status: Never Smoker   • Smokeless tobacco: Never Used   • Alcohol use No       Family History   Problem Relation Age of Onset   • Heart disease Mother    • No Known Problems Father    • No Known Problems Sister    • Kidney cancer Brother    • Leukemia Sister    • Breast cancer Neg Hx        The following portions of the patient's history were reviewed and updated as appropriate: allergies, current medications, past family history, past medical history, past social history, past surgical history and problem list.      Current Outpatient Prescriptions:   •  Biotin 1000 MCG tablet, Take 1,000 mcg by mouth 3 (Three) Times a Day., Disp: , Rfl:   •  Cholecalciferol (VITAMIN D3) 5000 UNITS capsule capsule, Take 5,000 Units by mouth Daily., Disp: , Rfl:   •  hydroxychloroquine (PLAQUENIL) 200 MG  "tablet, Take  by mouth Daily., Disp: , Rfl:   •  lisinopril-hydrochlorothiazide (PRINZIDE,ZESTORETIC) 20-25 MG per tablet, Take 1 tablet by mouth Daily., Disp: 90 tablet, Rfl: 1  •  metoprolol succinate XL (TOPROL-XL) 50 MG 24 hr tablet, Take 1.5 tablets by mouth Daily., Disp: 135 tablet, Rfl: 3  •  predniSONE (DELTASONE) 50 MG tablet, Take 50 mg by mouth Daily., Disp: , Rfl:     Review of Systems   Constitution: Negative for chills and fever.   Cardiovascular: Negative for chest pain and paroxysmal nocturnal dyspnea.   Respiratory: Negative for cough and shortness of breath.    Skin: Negative for rash.   Gastrointestinal: Negative for abdominal pain and heartburn.   Neurological: Negative for dizziness and numbness.       Objective   /72 (BP Location: Left arm, Patient Position: Sitting, Cuff Size: Adult)  Pulse 68  Ht 60\" (152.4 cm)  Wt 117 lb 12.8 oz (53.4 kg)  SpO2 100%  BMI 23.01 kg/m2  Physical Exam   Constitutional: She is oriented to person, place, and time. She appears well-developed and well-nourished.   HENT:   Head: Normocephalic and atraumatic.   Cardiovascular: Normal rate, regular rhythm and normal heart sounds.    No murmur heard.  Pulmonary/Chest: Effort normal and breath sounds normal.   Musculoskeletal: She exhibits no edema.   Neurological: She is alert and oriented to person, place, and time.   Skin: Skin is warm and dry.   Psychiatric: She has a normal mood and affect.       ECG 12 Lead  Date/Time: 10/9/2017 9:27 AM  Performed by: DAVID POLO  Authorized by: DAVID POLO   Comparison: compared with previous ECG from 1/23/2017  Similar to previous ECG  Rhythm: sinus rhythm  Rate: normal  Clinical impression: normal ECG              ICD-10-CM ICD-9-CM   1. Essential hypertension I10 401.9   2. Pulmonary hypertension I27.20 416.8   3. Heart palpitations R00.2 785.1         Assessment/Plan:  1.  Essential hypertension: Blood pressure is well controlled on current medical " therapy.     2.  Pulmonary hypertension: RVSP 45-55 mmHg on echo from 12/2017.  Possibly due to rheumatological etiology such as Lupus and Sjogren's diasease.  Will continue to monitor.    3.  Heart palpitations: Well controlled on metoprolol.  Will refill prescription.    4.  Thrombocytopenia: Follows with hematology oncology.  Agree with discontinuing aspirin.

## 2017-10-19 ENCOUNTER — OFFICE VISIT (OUTPATIENT)
Dept: GASTROENTEROLOGY | Facility: CLINIC | Age: 72
End: 2017-10-19

## 2017-10-19 VITALS
TEMPERATURE: 98.2 F | DIASTOLIC BLOOD PRESSURE: 74 MMHG | BODY MASS INDEX: 23.16 KG/M2 | WEIGHT: 118 LBS | OXYGEN SATURATION: 100 % | HEART RATE: 74 BPM | SYSTOLIC BLOOD PRESSURE: 112 MMHG | HEIGHT: 60 IN

## 2017-10-19 DIAGNOSIS — R19.5 HEME POSITIVE STOOL: Primary | ICD-10-CM

## 2017-10-19 DIAGNOSIS — D69.6 THROMBOCYTOPENIA (HCC): ICD-10-CM

## 2017-10-19 PROCEDURE — 99214 OFFICE O/P EST MOD 30 MIN: CPT | Performed by: NURSE PRACTITIONER

## 2017-10-19 NOTE — PROGRESS NOTES
"Chief Complaint   Patient presents with   • Rectal Bleeding     had stool cards positive       Subjective     HPI    Pt referred to office for occult positive stool card (one of three positive) as part of routine screenig.  She denies visible blood in stool.  No melena.  No abdominal pain.  No changes in bowels.  She uses Miralax to help with BM.  She denies heartburn or indigestion.  4/2017 US spleen-normal spleen.  CT a/p Dec 2017 showed mild wall thickening of distal colon likely due to underdistention artifact.  Liver noted to be unremarkable.  No hx of elevated LFT.  Pt states there has not been definitive diagnosis regarding low plt.    Labs reviewed from 10/19/2017    Hgb 11.7, Plt 53,     10/5/17 Plt 57    4/27/17 PT 17.4    Last colonoscopy in [facility]    Past Medical History:   Diagnosis Date   • Arrhythmia    • Bruit    • CAD (coronary artery disease)    • Edema extremities    • Fatigue    • Frequent urination    • HTN (hypertension)     Goal blood pressure less than 140/90 4/2016 bilateral renal US WNL   • Hyperlipidemia     Mixed   • Lupus    • Palpitations    • Platelet disorder    • Sinusitis    • SOB (shortness of breath)        Past Surgical History:   Procedure Laterality Date   • ANTERIOR AND POSTERIOR VAGINAL REPAIR N/A 12/14/2016    Procedure: ANTERIOR AND POSTERIOR VAGINAL REPAIR, CYSTO;  Surgeon: Danii Patricia MD;  Location: [facility] OR;  Service:    • APPENDECTOMY     • BREAST LUMPECTOMY Left 40yrs    benign   • CARDIAC CATHETERIZATION     • CARDIAC CATHETERIZATION Left 2006   • CHOLECYSTECTOMY     • FOOT SURGERY Right    • KNEE SURGERY Left     \"CLEANED IT OUT\"   • OOPHORECTOMY     • ND VAGINAL HYSTERECTOMY,UTERUS 250 GMS/< N/A 12/14/2016    Procedure: VAGINAL HYSTERECTOMY;  Surgeon: Danii Patricia MD;  Location: [facility] OR;  Service: Obstetrics/Gynecology   • TUBAL ABDOMINAL LIGATION     • VAGINAL HYSTERECTOMY  12/14/2016       Outpatient Prescriptions Marked as Taking for the 10/19/17 " encounter (Office Visit) with HARRIS Iglesias   Medication Sig Dispense Refill   • Biotin 1000 MCG tablet Take 1,000 mcg by mouth 3 (Three) Times a Day.     • Cholecalciferol (VITAMIN D3) 5000 UNITS capsule capsule Take 5,000 Units by mouth Daily.     • hydroxychloroquine (PLAQUENIL) 200 MG tablet Take  by mouth Daily.     • lisinopril-hydrochlorothiazide (PRINZIDE,ZESTORETIC) 20-25 MG per tablet Take 1 tablet by mouth Daily. 90 tablet 1   • metoprolol succinate XL (TOPROL-XL) 50 MG 24 hr tablet Take 1.5 tablets by mouth Daily. 135 tablet 3       Allergies   Allergen Reactions   • Percodan [Oxycodone-Aspirin] Hallucinations   • Zofran [Ondansetron Hcl]        Social History     Social History   • Marital status:      Spouse name: N/A   • Number of children: N/A   • Years of education: N/A     Occupational History   • Not on file.     Social History Main Topics   • Smoking status: Never Smoker   • Smokeless tobacco: Never Used   • Alcohol use No   • Drug use: No   • Sexual activity: Defer     Other Topics Concern   • Not on file     Social History Narrative       Family History   Problem Relation Age of Onset   • Heart disease Mother    • No Known Problems Father    • No Known Problems Sister    • Kidney cancer Brother    • Leukemia Sister    • Breast cancer Neg Hx    • Colon cancer Neg Hx    • Esophageal cancer Neg Hx        Review of Systems   Constitutional: Negative for fatigue, fever and unexpected weight change.   HENT: Negative for hearing loss, sore throat and voice change.    Eyes: Negative for visual disturbance.   Respiratory: Negative for cough, shortness of breath and wheezing.    Cardiovascular: Negative for chest pain and palpitations.   Gastrointestinal: Negative for abdominal pain, blood in stool and vomiting.   Endocrine: Negative for polydipsia and polyuria.   Genitourinary: Negative for difficulty urinating, dysuria, hematuria and urgency.   Musculoskeletal: Negative for joint  "swelling and myalgias.   Skin: Negative for color change, rash and wound.   Neurological: Negative for dizziness, tremors, seizures and syncope.   Hematological: Does not bruise/bleed easily.   Psychiatric/Behavioral: Negative for agitation and confusion. The patient is not nervous/anxious.        Objective     Vitals:    10/19/17 1259   BP: 112/74   Pulse: 74   Temp: 98.2 °F (36.8 °C)   SpO2: 100%   Weight: 118 lb (53.5 kg)   Height: 60\" (152.4 cm)     Body mass index is 23.05 kg/(m^2).    Physical Exam   Constitutional: She is oriented to person, place, and time. She appears well-developed and well-nourished.   HENT:   Head: Normocephalic and atraumatic.   Eyes: Conjunctivae are normal. Pupils are equal, round, and reactive to light. No scleral icterus.   Neck: No JVD present. No thyroid mass and no thyromegaly present.   Cardiovascular: Normal rate, regular rhythm and normal heart sounds.  Exam reveals no gallop and no friction rub.    No murmur heard.  Pulmonary/Chest: Effort normal and breath sounds normal. No accessory muscle usage. No respiratory distress. She has no wheezes. She has no rales.   Abdominal: Soft. Bowel sounds are normal. She exhibits no distension, no ascites and no mass. There is no splenomegaly or hepatomegaly. There is no tenderness. There is no rebound and no guarding.   Genitourinary:   Genitourinary Comments: Rectal-Did not examine   Musculoskeletal: Normal range of motion. She exhibits no edema.   Neurological: She is alert and oriented to person, place, and time.   Deemed a reliable historian, able to converse without difficulty and able to move all extremities without difficulty   Skin: Skin is warm and dry.   Psychiatric: She has a normal mood and affect. Her behavior is normal.       Imaging Results (most recent)     None          Assessment/Plan     [place] was seen today for rectal bleeding.    Diagnoses and all orders for this visit:    Heme positive stool  -     polyethylene " glycol (GoLYTELY) 236 g solution; Take 3,785 mL by mouth 1 (One) Time for 1 dose. Take as directed  -     Case Request; Standing  -     Implement Anesthesia Orders Day of Procedure; Standing  -     Obtain Informed Consent; Standing  -     Case Request    Thrombocytopenia      COLONOSCOPY WITH ANESTHESIA (N/A)   Pt understands this will be diagnostic only procedure and if any polyps are present they will not be removed due to low PLT    Patient is to continue all blood pressure and cardiac medications prior to procedure.     Reviewed with Dr Hawley, he was in agreement for treatment plan    All risks, benefits, alternatives, and indications of colonoscopy procedure have been discussed with the patient. Risks to include perforation of the colon requiring possible surgery or colostomy, risk of bleeding from biopsies or removal of colon tissue, possibility of missing a colon polyp or cancer, or adverse drug reaction.  Benefits to include the diagnosis and management of disease of the colon and rectum. Alternatives to include barium enema, radiographic evaluation, lab testing or no intervention. Pt verbalizes understanding and agrees to proceed with procedure.          There are no Patient Instructions on file for this visit.

## 2017-10-20 PROBLEM — R19.5 HEME POSITIVE STOOL: Status: ACTIVE | Noted: 2017-10-20

## 2017-11-17 ENCOUNTER — ANESTHESIA EVENT (OUTPATIENT)
Dept: GASTROENTEROLOGY | Facility: HOSPITAL | Age: 72
End: 2017-11-17

## 2017-11-17 ENCOUNTER — TELEPHONE (OUTPATIENT)
Dept: GASTROENTEROLOGY | Facility: CLINIC | Age: 72
End: 2017-11-17

## 2017-11-17 ENCOUNTER — HOSPITAL ENCOUNTER (OUTPATIENT)
Facility: HOSPITAL | Age: 72
Setting detail: HOSPITAL OUTPATIENT SURGERY
Discharge: HOME | End: 2017-11-17
Attending: INTERNAL MEDICINE | Admitting: INTERNAL MEDICINE

## 2017-11-17 ENCOUNTER — ANESTHESIA (OUTPATIENT)
Dept: GASTROENTEROLOGY | Facility: HOSPITAL | Age: 72
End: 2017-11-17

## 2017-11-17 VITALS
OXYGEN SATURATION: 100 % | RESPIRATION RATE: 17 BRPM | WEIGHT: 114 LBS | BODY MASS INDEX: 22.38 KG/M2 | HEIGHT: 60 IN | DIASTOLIC BLOOD PRESSURE: 65 MMHG | HEART RATE: 83 BPM | TEMPERATURE: 97.4 F | SYSTOLIC BLOOD PRESSURE: 117 MMHG

## 2017-11-17 DIAGNOSIS — R19.5 HEME POSITIVE STOOL: ICD-10-CM

## 2017-11-17 PROCEDURE — 45378 DIAGNOSTIC COLONOSCOPY: CPT | Performed by: INTERNAL MEDICINE

## 2017-11-17 PROCEDURE — 25010000002 PROPOFOL 10 MG/ML EMULSION: Performed by: NURSE ANESTHETIST, CERTIFIED REGISTERED

## 2017-11-17 RX ORDER — SODIUM CHLORIDE 9 MG/ML
500 INJECTION, SOLUTION INTRAVENOUS CONTINUOUS PRN
Status: DISCONTINUED | OUTPATIENT
Start: 2017-11-17 | End: 2017-11-17 | Stop reason: HOSPADM

## 2017-11-17 RX ORDER — PROPOFOL 10 MG/ML
VIAL (ML) INTRAVENOUS AS NEEDED
Status: DISCONTINUED | OUTPATIENT
Start: 2017-11-17 | End: 2017-11-17 | Stop reason: SURG

## 2017-11-17 RX ORDER — SODIUM CHLORIDE 0.9 % (FLUSH) 0.9 %
3 SYRINGE (ML) INJECTION AS NEEDED
Status: DISCONTINUED | OUTPATIENT
Start: 2017-11-17 | End: 2017-11-17 | Stop reason: HOSPADM

## 2017-11-17 RX ORDER — LIDOCAINE HYDROCHLORIDE 20 MG/ML
INJECTION, SOLUTION INFILTRATION; PERINEURAL AS NEEDED
Status: DISCONTINUED | OUTPATIENT
Start: 2017-11-17 | End: 2017-11-17 | Stop reason: SURG

## 2017-11-17 RX ORDER — SODIUM CHLORIDE 0.9 % (FLUSH) 0.9 %
1-10 SYRINGE (ML) INJECTION AS NEEDED
Status: CANCELLED | OUTPATIENT
Start: 2017-11-17

## 2017-11-17 RX ORDER — SODIUM CHLORIDE 9 MG/ML
100 INJECTION, SOLUTION INTRAVENOUS CONTINUOUS
Status: CANCELLED | OUTPATIENT
Start: 2017-11-17

## 2017-11-17 RX ADMIN — SODIUM CHLORIDE 500 ML: 9 INJECTION, SOLUTION INTRAVENOUS at 10:20

## 2017-11-17 RX ADMIN — LIDOCAINE HYDROCHLORIDE 80 MG: 20 INJECTION, SOLUTION INFILTRATION; PERINEURAL at 11:07

## 2017-11-17 RX ADMIN — LIDOCAINE HYDROCHLORIDE 0.5 ML: 10 INJECTION, SOLUTION EPIDURAL; INFILTRATION; INTRACAUDAL; PERINEURAL at 10:20

## 2017-11-17 RX ADMIN — SODIUM CHLORIDE: 9 INJECTION, SOLUTION INTRAVENOUS at 10:57

## 2017-11-17 RX ADMIN — LIDOCAINE HYDROCHLORIDE 20 MG: 20 INJECTION, SOLUTION INFILTRATION; PERINEURAL at 11:10

## 2017-11-17 RX ADMIN — PROPOFOL 50 MG: 10 INJECTION, EMULSION INTRAVENOUS at 11:07

## 2017-11-17 NOTE — PLAN OF CARE
Problem: Patient Care Overview (Adult)  Goal: Plan of Care Review  Outcome: Ongoing (interventions implemented as appropriate)    11/17/17 1107   Patient Care Overview   Progress improving   Outcome Evaluation   Outcome Summary/Follow up Plan no noted problems

## 2017-11-17 NOTE — PLAN OF CARE
Problem: GI Endoscopy (Adult)  Goal: Signs and Symptoms of Listed Potential Problems Will be Absent or Manageable (GI Endoscopy)  Outcome: Outcome(s) achieved Date Met:  11/17/17 11/17/17 1144   GI Endoscopy   Problems Assessed (GI Endoscopy) all   Problems Present (GI Endoscopy) none

## 2017-11-17 NOTE — ANESTHESIA POSTPROCEDURE EVALUATION
Patient: Sarah Espinoza    Procedure Summary     Date Anesthesia Start Anesthesia Stop Room / Location    11/17/17 1101 1116  [facility] /  [facility]       Procedure Diagnosis Surgeon Provider    COLONOSCOPY WITH ANESTHESIA (N/A ) Heme positive stool  (Heme positive stool [R19.5]) DO Lalo Felipe CRNA          Anesthesia Type: general  Last vitals  BP   115/69 (11/17/17 1005)   Temp   97.4 °F (36.3 °C) (11/17/17 1005)   Pulse   68 (11/17/17 1005)   Resp   18 (11/17/17 1005)     SpO2   100 % (11/17/17 1005)     Post Anesthesia Care and Evaluation    Patient location during evaluation: PACU  Level of consciousness: awake and awake and alert  Pain score: 0  Pain management: adequate  Airway patency: patent  Anesthetic complications: No anesthetic complications    Cardiovascular status: acceptable and stable  Respiratory status: acceptable and unassisted  Hydration status: acceptable

## 2017-11-17 NOTE — ANESTHESIA PREPROCEDURE EVALUATION
Anesthesia Evaluation     history of anesthetic complications: PONV  NPO Solid Status: > 8 hours  NPO Liquid Status: > 4 hours     Airway   Mallampati: III  TM distance: >3 FB  Neck ROM: full  no difficulty expected  Dental - normal exam   (+) lower dentures    Pulmonary - normal exam    breath sounds clear to auscultation  (-) asthma, recent URI, sleep apnea, not a smoker  Cardiovascular - normal exam  Exercise tolerance: good (4-7 METS)    Patient on routine beta blocker and Beta blocker given within 24 hours of surgery  Rhythm: regular  Rate: normal    (+) hypertension well controlled,   (-) pacemaker, past MI, angina, cardiac stents, CABG      Neuro/Psych  (-) seizures, TIA, CVA  GI/Hepatic/Renal/Endo    (-) GERD, liver disease, no renal disease, diabetes, hypothyroidism, hyperthyroidism    Musculoskeletal     Abdominal    Substance History      OB/GYN          Other        ROS/Med Hx Other: Thrombocytopenia, being followed by Dr. Boothe  Sjogren's Disease                                Anesthesia Plan    ASA 3     general   total IV anesthesia  intravenous induction   Anesthetic plan and risks discussed with patient.

## 2017-11-17 NOTE — PLAN OF CARE
Problem: Patient Care Overview (Adult)  Goal: Adult Individualization and Mutuality    11/17/17 1004   Individualization   Patient Specific Preferences none   Patient Specific Goals none   Patient Specific Interventions none   Mutuality/Individual Preferences   What Anxieties, Fears or Concerns Do You Have About Your Health or Care? none   What Questions Do You Have About Your Health or Care? none   What Information Would Help Us Give You More Personalized Care? none

## 2017-11-17 NOTE — H&P (VIEW-ONLY)
"Chief Complaint   Patient presents with   • Rectal Bleeding     had stool cards positive       Subjective     HPI    Pt referred to office for occult positive stool card (one of three positive) as part of routine screenig.  She denies visible blood in stool.  No melena.  No abdominal pain.  No changes in bowels.  She uses Miralax to help with BM.  She denies heartburn or indigestion.  4/2017 US spleen-normal spleen.  CT a/p Dec 2017 showed mild wall thickening of distal colon likely due to underdistention artifact.  Liver noted to be unremarkable.  No hx of elevated LFT.  Pt states there has not been definitive diagnosis regarding low plt.    Labs reviewed from 10/19/2017    Hgb 11.7, Plt 53,     10/5/17 Plt 57    4/27/17 PT 17.4    Last colonoscopy in [facility]    Past Medical History:   Diagnosis Date   • Arrhythmia    • Bruit    • CAD (coronary artery disease)    • Edema extremities    • Fatigue    • Frequent urination    • HTN (hypertension)     Goal blood pressure less than 140/90 4/2016 bilateral renal US WNL   • Hyperlipidemia     Mixed   • Lupus    • Palpitations    • Platelet disorder    • Sinusitis    • SOB (shortness of breath)        Past Surgical History:   Procedure Laterality Date   • ANTERIOR AND POSTERIOR VAGINAL REPAIR N/A 12/14/2016    Procedure: ANTERIOR AND POSTERIOR VAGINAL REPAIR, CYSTO;  Surgeon: Danii Patricia MD;  Location: [facility] OR;  Service:    • APPENDECTOMY     • BREAST LUMPECTOMY Left 40yrs    benign   • CARDIAC CATHETERIZATION     • CARDIAC CATHETERIZATION Left 2006   • CHOLECYSTECTOMY     • FOOT SURGERY Right    • KNEE SURGERY Left     \"CLEANED IT OUT\"   • OOPHORECTOMY     • NM VAGINAL HYSTERECTOMY,UTERUS 250 GMS/< N/A 12/14/2016    Procedure: VAGINAL HYSTERECTOMY;  Surgeon: Danii Patricia MD;  Location: [facility] OR;  Service: Obstetrics/Gynecology   • TUBAL ABDOMINAL LIGATION     • VAGINAL HYSTERECTOMY  12/14/2016       Outpatient Prescriptions Marked as Taking for the 10/19/17 " encounter (Office Visit) with HARRIS Iglesias   Medication Sig Dispense Refill   • Biotin 1000 MCG tablet Take 1,000 mcg by mouth 3 (Three) Times a Day.     • Cholecalciferol (VITAMIN D3) 5000 UNITS capsule capsule Take 5,000 Units by mouth Daily.     • hydroxychloroquine (PLAQUENIL) 200 MG tablet Take  by mouth Daily.     • lisinopril-hydrochlorothiazide (PRINZIDE,ZESTORETIC) 20-25 MG per tablet Take 1 tablet by mouth Daily. 90 tablet 1   • metoprolol succinate XL (TOPROL-XL) 50 MG 24 hr tablet Take 1.5 tablets by mouth Daily. 135 tablet 3       Allergies   Allergen Reactions   • Percodan [Oxycodone-Aspirin] Hallucinations   • Zofran [Ondansetron Hcl]        Social History     Social History   • Marital status:      Spouse name: N/A   • Number of children: N/A   • Years of education: N/A     Occupational History   • Not on file.     Social History Main Topics   • Smoking status: Never Smoker   • Smokeless tobacco: Never Used   • Alcohol use No   • Drug use: No   • Sexual activity: Defer     Other Topics Concern   • Not on file     Social History Narrative       Family History   Problem Relation Age of Onset   • Heart disease Mother    • No Known Problems Father    • No Known Problems Sister    • Kidney cancer Brother    • Leukemia Sister    • Breast cancer Neg Hx    • Colon cancer Neg Hx    • Esophageal cancer Neg Hx        Review of Systems   Constitutional: Negative for fatigue, fever and unexpected weight change.   HENT: Negative for hearing loss, sore throat and voice change.    Eyes: Negative for visual disturbance.   Respiratory: Negative for cough, shortness of breath and wheezing.    Cardiovascular: Negative for chest pain and palpitations.   Gastrointestinal: Negative for abdominal pain, blood in stool and vomiting.   Endocrine: Negative for polydipsia and polyuria.   Genitourinary: Negative for difficulty urinating, dysuria, hematuria and urgency.   Musculoskeletal: Negative for joint  "swelling and myalgias.   Skin: Negative for color change, rash and wound.   Neurological: Negative for dizziness, tremors, seizures and syncope.   Hematological: Does not bruise/bleed easily.   Psychiatric/Behavioral: Negative for agitation and confusion. The patient is not nervous/anxious.        Objective     Vitals:    10/19/17 1259   BP: 112/74   Pulse: 74   Temp: 98.2 °F (36.8 °C)   SpO2: 100%   Weight: 118 lb (53.5 kg)   Height: 60\" (152.4 cm)     Body mass index is 23.05 kg/(m^2).    Physical Exam   Constitutional: She is oriented to person, place, and time. She appears well-developed and well-nourished.   HENT:   Head: Normocephalic and atraumatic.   Eyes: Conjunctivae are normal. Pupils are equal, round, and reactive to light. No scleral icterus.   Neck: No JVD present. No thyroid mass and no thyromegaly present.   Cardiovascular: Normal rate, regular rhythm and normal heart sounds.  Exam reveals no gallop and no friction rub.    No murmur heard.  Pulmonary/Chest: Effort normal and breath sounds normal. No accessory muscle usage. No respiratory distress. She has no wheezes. She has no rales.   Abdominal: Soft. Bowel sounds are normal. She exhibits no distension, no ascites and no mass. There is no splenomegaly or hepatomegaly. There is no tenderness. There is no rebound and no guarding.   Genitourinary:   Genitourinary Comments: Rectal-Did not examine   Musculoskeletal: Normal range of motion. She exhibits no edema.   Neurological: She is alert and oriented to person, place, and time.   Deemed a reliable historian, able to converse without difficulty and able to move all extremities without difficulty   Skin: Skin is warm and dry.   Psychiatric: She has a normal mood and affect. Her behavior is normal.       Imaging Results (most recent)     None          Assessment/Plan     [place] was seen today for rectal bleeding.    Diagnoses and all orders for this visit:    Heme positive stool  -     polyethylene " glycol (GoLYTELY) 236 g solution; Take 3,785 mL by mouth 1 (One) Time for 1 dose. Take as directed  -     Case Request; Standing  -     Implement Anesthesia Orders Day of Procedure; Standing  -     Obtain Informed Consent; Standing  -     Case Request    Thrombocytopenia      COLONOSCOPY WITH ANESTHESIA (N/A)   Pt understands this will be diagnostic only procedure and if any polyps are present they will not be removed due to low PLT    Patient is to continue all blood pressure and cardiac medications prior to procedure.     Reviewed with Dr Hawley, he was in agreement for treatment plan    All risks, benefits, alternatives, and indications of colonoscopy procedure have been discussed with the patient. Risks to include perforation of the colon requiring possible surgery or colostomy, risk of bleeding from biopsies or removal of colon tissue, possibility of missing a colon polyp or cancer, or adverse drug reaction.  Benefits to include the diagnosis and management of disease of the colon and rectum. Alternatives to include barium enema, radiographic evaluation, lab testing or no intervention. Pt verbalizes understanding and agrees to proceed with procedure.          There are no Patient Instructions on file for this visit.

## 2017-11-17 NOTE — PLAN OF CARE
Problem: Patient Care Overview (Adult)  Goal: Plan of Care Review  Outcome: Outcome(s) achieved Date Met:  11/17/17 11/17/17 1143   Patient Care Overview   Progress improving   Outcome Evaluation   Outcome Summary/Follow up Plan DISCHARGE CRITERIA MET   Coping/Psychosocial Response Interventions   Plan Of Care Reviewed With patient;spouse

## 2018-10-15 ENCOUNTER — OFFICE VISIT (OUTPATIENT)
Dept: CARDIOLOGY | Facility: CLINIC | Age: 73
End: 2018-10-15

## 2018-10-15 VITALS
HEIGHT: 60 IN | BODY MASS INDEX: 21.4 KG/M2 | WEIGHT: 109 LBS | SYSTOLIC BLOOD PRESSURE: 130 MMHG | HEART RATE: 79 BPM | DIASTOLIC BLOOD PRESSURE: 68 MMHG | OXYGEN SATURATION: 98 %

## 2018-10-15 DIAGNOSIS — I10 ESSENTIAL HYPERTENSION: Primary | ICD-10-CM

## 2018-10-15 DIAGNOSIS — R00.2 HEART PALPITATIONS: ICD-10-CM

## 2018-10-15 DIAGNOSIS — I27.20 PULMONARY HYPERTENSION (HCC): ICD-10-CM

## 2018-10-15 DIAGNOSIS — D69.6 THROMBOCYTOPENIA (HCC): ICD-10-CM

## 2018-10-15 PROCEDURE — 99214 OFFICE O/P EST MOD 30 MIN: CPT | Performed by: INTERNAL MEDICINE

## 2018-10-15 RX ORDER — METOPROLOL SUCCINATE 50 MG/1
75 TABLET, EXTENDED RELEASE ORAL DAILY
Qty: 135 TABLET | Refills: 3 | Status: SHIPPED | OUTPATIENT
Start: 2018-10-15 | End: 2019-12-06 | Stop reason: SDUPTHER

## 2018-10-15 RX ORDER — LISINOPRIL AND HYDROCHLOROTHIAZIDE 25; 20 MG/1; MG/1
1 TABLET ORAL DAILY
Qty: 90 TABLET | Refills: 1 | Status: SHIPPED | OUTPATIENT
Start: 2018-10-15 | End: 2019-10-14 | Stop reason: SDUPTHER

## 2018-10-15 NOTE — PROGRESS NOTES
Reason for Visit: cardiovascular follow up.    HPI:  Sarah Espinoza is a 73 y.o. female is here today for follow-up.  She continues to deal with the thrombocytopenia.  She takes Promacta for this under the direction of hematology oncology.  Reports being diagnosed with ITP.  Has not had any cardiac issues and denies any chest pain, palpitations, dizziness, syncope, PND, or orthopnea.  Her shortness of breath is at baseline.  She reports good control of her blood pressure.    Previous Cardiac Testing and Procedures:  - Echocardiogram (12/21/2016) EF 55%, grade 1 diastolic dysfunction, RVSP 45-55 mmHg, normal RV size and function  - Nuclear stress (01/09/2017) normal myocardial perfusion with no evidence of ischemia, EF 70%  - Lipid panel (12/20/16) 113/26/69/103    Patient Active Problem List   Diagnosis   • Essential hypertension   • Lupus   • Arthritis   • Female bladder prolapse   • Heart palpitations   • Seasonal allergies   • Uterine prolapse   • Pulmonary hypertension (CMS/HCC)   • Sjogren's disease (CMS/HCC)   • Heme positive stool   • Thrombocytopenia (CMS/HCC)       Social History   Substance Use Topics   • Smoking status: Never Smoker   • Smokeless tobacco: Never Used   • Alcohol use No       Family History   Problem Relation Age of Onset   • Heart disease Mother    • No Known Problems Father    • No Known Problems Sister    • Kidney cancer Brother    • Leukemia Sister    • Breast cancer Neg Hx    • Colon cancer Neg Hx    • Esophageal cancer Neg Hx        The following portions of the patient's history were reviewed and updated as appropriate: allergies, current medications, past family history, past medical history, past social history, past surgical history and problem list.      Current Outpatient Prescriptions:   •  eltrombopag (PROMACTA) 75 MG tablet tablet, Take 75 mg by mouth Daily., Disp: , Rfl:   •  hydroxychloroquine (PLAQUENIL) 200 MG tablet, Take  by mouth Daily., Disp: , Rfl:   •   "lisinopril-hydrochlorothiazide (PRINZIDE,ZESTORETIC) 20-25 MG per tablet, Take 1 tablet by mouth Daily., Disp: 90 tablet, Rfl: 1  •  metoprolol succinate XL (TOPROL-XL) 50 MG 24 hr tablet, Take 1.5 tablets by mouth Daily., Disp: 135 tablet, Rfl: 3    Review of Systems   Constitution: Negative for chills and fever.   Cardiovascular: Negative for chest pain and paroxysmal nocturnal dyspnea.   Respiratory: Negative for cough and shortness of breath.    Skin: Negative for rash.   Gastrointestinal: Negative for abdominal pain and heartburn.   Neurological: Negative for dizziness and numbness.       Objective   /68 (BP Location: Left arm, Patient Position: Sitting, Cuff Size: Adult)   Pulse 79   Ht 152.4 cm (60\")   Wt 49.4 kg (109 lb)   SpO2 98%   BMI 21.29 kg/m²   Physical Exam   Constitutional: She is oriented to person, place, and time. She appears well-developed and well-nourished.   HENT:   Head: Normocephalic and atraumatic.   Cardiovascular: Normal rate, regular rhythm and normal heart sounds.    No murmur heard.  Pulmonary/Chest: Effort normal and breath sounds normal.   Abdominal: She exhibits no distension.   Musculoskeletal: She exhibits no edema.   Neurological: She is alert and oriented to person, place, and time.   Skin: Skin is warm and dry.   Psychiatric: She has a normal mood and affect.     Procedures      ICD-10-CM ICD-9-CM   1. Essential hypertension I10 401.9   2. Heart palpitations R00.2 785.1   3. Pulmonary hypertension (CMS/[facility]) I27.20 416.8   4. Thrombocytopenia (CMS/[facility]) D69.6 287.5         Assessment/Plan:  1.  Systemic hypertension: Blood pressure remains well-controlled on current medications.     2.  Pulmonary hypertension: RVSP 45-55 mmHg on echo from 12/2017.  Likely due to rheumatological etiology such as Lupus and Sjogren's diasease.       3.  Heart palpitations: Symptoms stable on metoprolol.  Refill.     4.  Thrombocytopenia: Follows with hematology oncology.       "

## 2019-06-20 ENCOUNTER — TRANSCRIBE ORDERS (OUTPATIENT)
Dept: ADMINISTRATIVE | Facility: HOSPITAL | Age: 74
End: 2019-06-20

## 2019-06-20 ENCOUNTER — HOSPITAL ENCOUNTER (OUTPATIENT)
Dept: INFUSION THERAPY | Age: 74
Discharge: HOME | End: 2019-06-20
Payer: MEDICARE

## 2019-06-20 DIAGNOSIS — Z12.31 ENCOUNTER FOR SCREENING MAMMOGRAM FOR MALIGNANT NEOPLASM OF BREAST: Primary | ICD-10-CM

## 2019-06-20 PROCEDURE — 85025 COMPLETE CBC W/AUTO DIFF WBC: CPT

## 2019-06-20 PROCEDURE — 99211 OFF/OP EST MAY X REQ PHY/QHP: CPT

## 2019-06-20 PROCEDURE — 80053 COMPREHEN METABOLIC PANEL: CPT

## 2019-06-25 ENCOUNTER — HOSPITAL ENCOUNTER (OUTPATIENT)
Dept: MAMMOGRAPHY | Facility: HOSPITAL | Age: 74
Discharge: HOME | End: 2019-06-25
Admitting: INTERNAL MEDICINE

## 2019-06-25 DIAGNOSIS — Z12.31 ENCOUNTER FOR SCREENING MAMMOGRAM FOR MALIGNANT NEOPLASM OF BREAST: ICD-10-CM

## 2019-06-25 PROCEDURE — 77067 SCR MAMMO BI INCL CAD: CPT

## 2019-06-25 PROCEDURE — 77063 BREAST TOMOSYNTHESIS BI: CPT

## 2019-07-08 ENCOUNTER — HOSPITAL ENCOUNTER (OUTPATIENT)
Dept: INFUSION THERAPY | Age: 74
Discharge: HOME | End: 2019-07-08
Payer: MEDICARE

## 2019-07-08 PROCEDURE — 85025 COMPLETE CBC W/AUTO DIFF WBC: CPT

## 2019-07-24 ENCOUNTER — HOSPITAL ENCOUNTER (OUTPATIENT)
Dept: INFUSION THERAPY | Age: 74
Discharge: HOME | End: 2019-07-24
Payer: MEDICARE

## 2019-07-24 PROCEDURE — 85025 COMPLETE CBC W/AUTO DIFF WBC: CPT

## 2019-07-26 VITALS — DIASTOLIC BLOOD PRESSURE: 64 MMHG | HEART RATE: 66 BPM | WEIGHT: 108 LBS | SYSTOLIC BLOOD PRESSURE: 124 MMHG

## 2019-07-26 DIAGNOSIS — I48.0 PAROXYSMAL ATRIAL FIBRILLATION (HCC): ICD-10-CM

## 2019-07-26 DIAGNOSIS — D69.3 IMMUNE THROMBOCYTOPENIC PURPURA (HCC): ICD-10-CM

## 2019-07-26 DIAGNOSIS — D69.6 THROMBOCYTOPENIA, UNSPECIFIED (HCC): ICD-10-CM

## 2019-07-26 DIAGNOSIS — G47.30 SLEEP APNEA, UNSPECIFIED TYPE: ICD-10-CM

## 2019-07-26 DIAGNOSIS — M32.0 DRUG-INDUCED SYSTEMIC LUPUS ERYTHEMATOSUS, UNSPECIFIED ORGAN INVOLVEMENT STATUS (HCC): ICD-10-CM

## 2019-07-26 PROBLEM — M32.9 SYSTEMIC LUPUS ERYTHEMATOSUS (HCC): Status: ACTIVE | Noted: 2019-07-26

## 2019-07-26 RX ORDER — POTASSIUM CHLORIDE 1.5 G/1.77G
20 POWDER, FOR SOLUTION ORAL DAILY
COMMUNITY
End: 2020-02-25

## 2019-07-26 RX ORDER — ONDANSETRON HYDROCHLORIDE 8 MG/1
8 TABLET, FILM COATED ORAL EVERY 8 HOURS PRN
COMMUNITY
End: 2020-02-25

## 2019-08-14 ENCOUNTER — HOSPITAL ENCOUNTER (OUTPATIENT)
Dept: INFUSION THERAPY | Age: 74
Discharge: HOME | End: 2019-08-14
Payer: MEDICARE

## 2019-08-14 DIAGNOSIS — D69.6 THROMBOCYTOPENIA, UNSPECIFIED (HCC): Primary | ICD-10-CM

## 2019-08-14 DIAGNOSIS — D69.6 THROMBOCYTOPENIA, UNSPECIFIED (HCC): ICD-10-CM

## 2019-08-14 PROCEDURE — 85025 COMPLETE CBC W/AUTO DIFF WBC: CPT

## 2019-09-24 ENCOUNTER — HOSPITAL ENCOUNTER (OUTPATIENT)
Dept: INFUSION THERAPY | Age: 74
Discharge: HOME | End: 2019-09-24
Payer: MEDICARE

## 2019-09-24 ENCOUNTER — OFFICE VISIT (OUTPATIENT)
Dept: HEMATOLOGY | Age: 74
End: 2019-09-24
Payer: MEDICARE

## 2019-09-24 ENCOUNTER — TELEPHONE (OUTPATIENT)
Dept: HEMATOLOGY | Age: 74
End: 2019-09-24

## 2019-09-24 VITALS
WEIGHT: 107.2 LBS | HEIGHT: 60 IN | HEART RATE: 54 BPM | OXYGEN SATURATION: 99 % | DIASTOLIC BLOOD PRESSURE: 64 MMHG | BODY MASS INDEX: 21.05 KG/M2 | SYSTOLIC BLOOD PRESSURE: 122 MMHG

## 2019-09-24 DIAGNOSIS — D69.3 CHRONIC ITP (IDIOPATHIC THROMBOCYTOPENIC PURPURA) (HCC): Primary | ICD-10-CM

## 2019-09-24 DIAGNOSIS — R11.0 NAUSEA: ICD-10-CM

## 2019-09-24 DIAGNOSIS — D50.8 IRON DEFICIENCY ANEMIA SECONDARY TO INADEQUATE DIETARY IRON INTAKE: ICD-10-CM

## 2019-09-24 DIAGNOSIS — D64.9 ANEMIA, UNSPECIFIED TYPE: ICD-10-CM

## 2019-09-24 DIAGNOSIS — D69.6 THROMBOCYTOPENIA, UNSPECIFIED (HCC): ICD-10-CM

## 2019-09-24 PROCEDURE — 82607 VITAMIN B-12: CPT

## 2019-09-24 PROCEDURE — G8400 PT W/DXA NO RESULTS DOC: HCPCS | Performed by: NURSE PRACTITIONER

## 2019-09-24 PROCEDURE — 82746 ASSAY OF FOLIC ACID SERUM: CPT

## 2019-09-24 PROCEDURE — 85025 COMPLETE CBC W/AUTO DIFF WBC: CPT

## 2019-09-24 PROCEDURE — 1123F ACP DISCUSS/DSCN MKR DOCD: CPT | Performed by: NURSE PRACTITIONER

## 2019-09-24 PROCEDURE — 3017F COLORECTAL CA SCREEN DOC REV: CPT | Performed by: NURSE PRACTITIONER

## 2019-09-24 PROCEDURE — 1090F PRES/ABSN URINE INCON ASSESS: CPT | Performed by: NURSE PRACTITIONER

## 2019-09-24 PROCEDURE — 82728 ASSAY OF FERRITIN: CPT

## 2019-09-24 PROCEDURE — 1036F TOBACCO NON-USER: CPT | Performed by: NURSE PRACTITIONER

## 2019-09-24 PROCEDURE — 4040F PNEUMOC VAC/ADMIN/RCVD: CPT | Performed by: NURSE PRACTITIONER

## 2019-09-24 PROCEDURE — 83550 IRON BINDING TEST: CPT

## 2019-09-24 PROCEDURE — 83540 ASSAY OF IRON: CPT

## 2019-09-24 PROCEDURE — 99213 OFFICE O/P EST LOW 20 MIN: CPT | Performed by: NURSE PRACTITIONER

## 2019-09-24 PROCEDURE — 80053 COMPREHEN METABOLIC PANEL: CPT

## 2019-09-24 PROCEDURE — G8420 CALC BMI NORM PARAMETERS: HCPCS | Performed by: NURSE PRACTITIONER

## 2019-09-24 PROCEDURE — G8427 DOCREV CUR MEDS BY ELIG CLIN: HCPCS | Performed by: NURSE PRACTITIONER

## 2019-09-24 PROCEDURE — 36415 COLL VENOUS BLD VENIPUNCTURE: CPT

## 2019-09-24 RX ORDER — HYDROXYCHLOROQUINE SULFATE 200 MG/1
TABLET, FILM COATED ORAL DAILY
COMMUNITY

## 2019-09-24 RX ORDER — LISINOPRIL AND HYDROCHLOROTHIAZIDE 25; 20 MG/1; MG/1
1 TABLET ORAL DAILY
COMMUNITY
End: 2022-08-03 | Stop reason: ALTCHOICE

## 2019-09-24 RX ORDER — METOPROLOL SUCCINATE 50 MG/1
50 TABLET, EXTENDED RELEASE ORAL DAILY
COMMUNITY

## 2019-10-07 PROBLEM — R11.0 NAUSEA: Status: ACTIVE | Noted: 2019-10-07

## 2019-10-07 PROBLEM — D50.8 IRON DEFICIENCY ANEMIA SECONDARY TO INADEQUATE DIETARY IRON INTAKE: Status: ACTIVE | Noted: 2019-10-07

## 2019-10-07 ASSESSMENT — ENCOUNTER SYMPTOMS
EYE DISCHARGE: 0
COUGH: 0
EYE REDNESS: 0
EYE PAIN: 0
SHORTNESS OF BREATH: 0
SORE THROAT: 0
BLOOD IN STOOL: 0
BACK PAIN: 0
CONSTIPATION: 0
RESPIRATORY NEGATIVE: 1
DIARRHEA: 0
ABDOMINAL PAIN: 0
EYES NEGATIVE: 1
WHEEZING: 0
NAUSEA: 1
VOMITING: 0

## 2019-10-14 DIAGNOSIS — I10 ESSENTIAL HYPERTENSION: ICD-10-CM

## 2019-10-14 RX ORDER — LISINOPRIL AND HYDROCHLOROTHIAZIDE 25; 20 MG/1; MG/1
1 TABLET ORAL DAILY
Qty: 90 TABLET | Refills: 1 | Status: SHIPPED | OUTPATIENT
Start: 2019-10-14 | End: 2019-12-06 | Stop reason: SDUPTHER

## 2019-10-22 ENCOUNTER — HOSPITAL ENCOUNTER (OUTPATIENT)
Dept: INFUSION THERAPY | Age: 74
Discharge: HOME | End: 2019-10-22
Payer: MEDICARE

## 2019-10-22 DIAGNOSIS — D69.6 THROMBOCYTOPENIA, UNSPECIFIED (HCC): ICD-10-CM

## 2019-10-22 DIAGNOSIS — D69.6 THROMBOCYTOPENIA, UNSPECIFIED (HCC): Primary | ICD-10-CM

## 2019-10-22 PROCEDURE — 85025 COMPLETE CBC W/AUTO DIFF WBC: CPT

## 2019-11-25 ENCOUNTER — CLINICAL DOCUMENTATION (OUTPATIENT)
Dept: HEMATOLOGY | Age: 74
End: 2019-11-25

## 2019-11-25 ENCOUNTER — HOSPITAL ENCOUNTER (OUTPATIENT)
Dept: INFUSION THERAPY | Age: 74
Discharge: HOME | End: 2019-11-25
Payer: MEDICARE

## 2019-11-25 ENCOUNTER — OFFICE VISIT (OUTPATIENT)
Dept: HEMATOLOGY | Age: 74
End: 2019-11-25
Payer: MEDICARE

## 2019-11-25 VITALS
HEIGHT: 60 IN | HEART RATE: 67 BPM | BODY MASS INDEX: 21.14 KG/M2 | OXYGEN SATURATION: 99 % | SYSTOLIC BLOOD PRESSURE: 120 MMHG | WEIGHT: 107.7 LBS | DIASTOLIC BLOOD PRESSURE: 64 MMHG

## 2019-11-25 DIAGNOSIS — D69.6 THROMBOCYTOPENIA, UNSPECIFIED (HCC): Primary | ICD-10-CM

## 2019-11-25 DIAGNOSIS — D69.6 THROMBOCYTOPENIA, UNSPECIFIED (HCC): ICD-10-CM

## 2019-11-25 DIAGNOSIS — R11.0 NAUSEA: ICD-10-CM

## 2019-11-25 DIAGNOSIS — D50.8 IRON DEFICIENCY ANEMIA SECONDARY TO INADEQUATE DIETARY IRON INTAKE: ICD-10-CM

## 2019-11-25 PROCEDURE — 99214 OFFICE O/P EST MOD 30 MIN: CPT | Performed by: NURSE PRACTITIONER

## 2019-11-25 PROCEDURE — 85025 COMPLETE CBC W/AUTO DIFF WBC: CPT

## 2019-11-25 PROCEDURE — 1090F PRES/ABSN URINE INCON ASSESS: CPT | Performed by: NURSE PRACTITIONER

## 2019-11-25 PROCEDURE — 99211 OFF/OP EST MAY X REQ PHY/QHP: CPT

## 2019-11-25 PROCEDURE — 3017F COLORECTAL CA SCREEN DOC REV: CPT | Performed by: NURSE PRACTITIONER

## 2019-11-25 PROCEDURE — 1123F ACP DISCUSS/DSCN MKR DOCD: CPT | Performed by: NURSE PRACTITIONER

## 2019-11-25 PROCEDURE — G8484 FLU IMMUNIZE NO ADMIN: HCPCS | Performed by: NURSE PRACTITIONER

## 2019-11-25 PROCEDURE — G8427 DOCREV CUR MEDS BY ELIG CLIN: HCPCS | Performed by: NURSE PRACTITIONER

## 2019-11-25 PROCEDURE — 4040F PNEUMOC VAC/ADMIN/RCVD: CPT | Performed by: NURSE PRACTITIONER

## 2019-11-25 PROCEDURE — G8400 PT W/DXA NO RESULTS DOC: HCPCS | Performed by: NURSE PRACTITIONER

## 2019-11-25 PROCEDURE — 1036F TOBACCO NON-USER: CPT | Performed by: NURSE PRACTITIONER

## 2019-11-25 PROCEDURE — G8420 CALC BMI NORM PARAMETERS: HCPCS | Performed by: NURSE PRACTITIONER

## 2019-11-25 ASSESSMENT — ENCOUNTER SYMPTOMS
SORE THROAT: 0
RESPIRATORY NEGATIVE: 1
CONSTIPATION: 0
EYES NEGATIVE: 1
SHORTNESS OF BREATH: 0
ABDOMINAL PAIN: 0
COUGH: 0
DIARRHEA: 0
EYE DISCHARGE: 0
VOMITING: 0
WHEEZING: 0
BLOOD IN STOOL: 0
EYE REDNESS: 0
BACK PAIN: 0
EYE PAIN: 0
NAUSEA: 1

## 2019-12-06 ENCOUNTER — OFFICE VISIT (OUTPATIENT)
Dept: CARDIOLOGY | Facility: CLINIC | Age: 74
End: 2019-12-06

## 2019-12-06 VITALS
OXYGEN SATURATION: 99 % | SYSTOLIC BLOOD PRESSURE: 122 MMHG | BODY MASS INDEX: 21.01 KG/M2 | HEIGHT: 60 IN | DIASTOLIC BLOOD PRESSURE: 60 MMHG | HEART RATE: 62 BPM | WEIGHT: 107 LBS

## 2019-12-06 DIAGNOSIS — R00.2 HEART PALPITATIONS: ICD-10-CM

## 2019-12-06 DIAGNOSIS — I10 ESSENTIAL HYPERTENSION: Primary | ICD-10-CM

## 2019-12-06 DIAGNOSIS — I27.20 PULMONARY HYPERTENSION (HCC): ICD-10-CM

## 2019-12-06 DIAGNOSIS — D69.6 THROMBOCYTOPENIA (HCC): ICD-10-CM

## 2019-12-06 PROCEDURE — 93000 ELECTROCARDIOGRAM COMPLETE: CPT | Performed by: INTERNAL MEDICINE

## 2019-12-06 PROCEDURE — 99214 OFFICE O/P EST MOD 30 MIN: CPT | Performed by: INTERNAL MEDICINE

## 2019-12-06 RX ORDER — METOPROLOL SUCCINATE 50 MG/1
75 TABLET, EXTENDED RELEASE ORAL DAILY
Qty: 135 TABLET | Refills: 3 | Status: SHIPPED | OUTPATIENT
Start: 2019-12-06 | End: 2021-01-26

## 2019-12-06 RX ORDER — LISINOPRIL AND HYDROCHLOROTHIAZIDE 25; 20 MG/1; MG/1
1 TABLET ORAL DAILY
Qty: 90 TABLET | Refills: 3 | Status: SHIPPED | OUTPATIENT
Start: 2019-12-06 | End: 2021-01-26

## 2019-12-06 NOTE — PROGRESS NOTES
Reason for Visit: cardiovascular follow up.    HPI:  Sarah Espinoza is a 74 y.o. female is here today for 1 year follow-up.  She reports doing relatively well over the last year.  She denies any chest pain, dizziness, syncope, PND, or orthopnea.  She still gets short of breath with exertion.  She only has occasional palpitations that she feels are controlled with the metoprolol.  Her blood pressures been well controlled.  She reports the hematologists are going to start weaning her off Promacta and monitor her platelet count.      Previous Cardiac Testing and Procedures:  - Echocardiogram (12/21/2016) EF 55%, grade 1 diastolic dysfunction, RVSP 45-55 mmHg, normal RV size and function  - Nuclear stress (01/09/2017) normal myocardial perfusion with no evidence of ischemia, EF 70%  - Lipid panel (12/20/16) 113/26/69/103    Patient Active Problem List   Diagnosis   • Essential hypertension   • Lupus (CMS/HCC)   • Arthritis   • Female bladder prolapse   • Heart palpitations   • Seasonal allergies   • Uterine prolapse   • Pulmonary hypertension (CMS/HCC)   • Sjogren's disease (CMS/HCC)   • Heme positive stool   • Thrombocytopenia (CMS/HCC)       Social History     Tobacco Use   • Smoking status: Never Smoker   • Smokeless tobacco: Never Used   Substance Use Topics   • Alcohol use: No   • Drug use: No       Family History   Problem Relation Age of Onset   • Heart disease Mother    • No Known Problems Father    • No Known Problems Sister    • Kidney cancer Brother    • Leukemia Sister    • Breast cancer Neg Hx    • Colon cancer Neg Hx    • Esophageal cancer Neg Hx        The following portions of the patient's history were reviewed and updated as appropriate: allergies, current medications, past family history, past medical history, past social history, past surgical history and problem list.      Current Outpatient Medications:   •  eltrombopag (PROMACTA) 75 MG tablet tablet, Take 75 mg by mouth Daily., Disp: , Rfl:  "  •  hydroxychloroquine (PLAQUENIL) 200 MG tablet, Take  by mouth Daily., Disp: , Rfl:   •  lisinopril-hydrochlorothiazide (PRINZIDE,ZESTORETIC) 20-25 MG per tablet, Take 1 tablet by mouth Daily., Disp: 90 tablet, Rfl: 3  •  metoprolol succinate XL (TOPROL-XL) 50 MG 24 hr tablet, Take 1.5 tablets by mouth Daily., Disp: 135 tablet, Rfl: 3    Review of Systems   Constitution: Negative for chills and fever.   Cardiovascular: Positive for dyspnea on exertion and palpitations. Negative for chest pain and paroxysmal nocturnal dyspnea.   Respiratory: Positive for shortness of breath. Negative for cough.    Skin: Negative for rash.   Gastrointestinal: Negative for abdominal pain and heartburn.   Neurological: Negative for dizziness and numbness.       Objective   /60 (BP Location: Left arm, Patient Position: Sitting, Cuff Size: Adult)   Pulse 62   Ht 152.4 cm (60\")   Wt 48.5 kg (107 lb)   SpO2 99%   BMI 20.90 kg/m²   Physical Exam   Constitutional: She is oriented to person, place, and time. She appears well-developed and well-nourished.   HENT:   Head: Normocephalic and atraumatic.   Cardiovascular: Normal rate, regular rhythm and normal heart sounds.   No murmur heard.  Pulmonary/Chest: Effort normal and breath sounds normal.   Abdominal: She exhibits no distension.   Musculoskeletal: She exhibits no edema.   Neurological: She is alert and oriented to person, place, and time.   Skin: Skin is warm and dry.   Psychiatric: She has a normal mood and affect.       ECG 12 Lead  Date/Time: 12/6/2019 1:07 PM  Performed by: Anant Hernandez MD  Authorized by: Anant Hernandez MD   Comparison: compared with previous ECG from 10/9/2017  Comparison to previous ECG: Criteria for LVH are now present  Rhythm: sinus rhythm  Rate: normal  Other findings: left ventricular hypertrophy              ICD-10-CM ICD-9-CM   1. Essential hypertension I10 401.9   2. Pulmonary hypertension (CMS/HCC) I27.20 416.8   3. Heart palpitations " R00.2 785.1   4. Thrombocytopenia (CMS/[facility]) D69.6 287.5         Assessment/Plan:  1. Systemic hypertension: Blood pressure remains well-controlled on current therapy.  Refill prescriptions.     2.  Pulmonary hypertension: RVSP 45-55 mmHg on last echo from 12/2016.    [place] to be related to rheumatological etiology such as Lupus and Sjogren's diasease.  Breathing symptoms are stable.     3.  Heart palpitations:  Symptoms controlled on metoprolol.  Prescription refilled.     4.  Thrombocytopenia: Follows with hematology oncology and managed on Promacta.

## 2019-12-16 ENCOUNTER — HOSPITAL ENCOUNTER (OUTPATIENT)
Dept: INFUSION THERAPY | Age: 74
Discharge: HOME | End: 2019-12-16
Payer: MEDICARE

## 2019-12-16 DIAGNOSIS — D69.6 THROMBOCYTOPENIA, UNSPECIFIED (HCC): ICD-10-CM

## 2019-12-16 PROCEDURE — 85025 COMPLETE CBC W/AUTO DIFF WBC: CPT

## 2020-01-07 ENCOUNTER — HOSPITAL ENCOUNTER (OUTPATIENT)
Dept: INFUSION THERAPY | Age: 75
Discharge: HOME | End: 2020-01-07
Payer: MEDICARE

## 2020-01-07 ENCOUNTER — OFFICE VISIT (OUTPATIENT)
Dept: HEMATOLOGY | Age: 75
End: 2020-01-07
Payer: MEDICARE

## 2020-01-07 VITALS
HEART RATE: 60 BPM | SYSTOLIC BLOOD PRESSURE: 118 MMHG | OXYGEN SATURATION: 99 % | BODY MASS INDEX: 21.03 KG/M2 | HEIGHT: 60 IN | DIASTOLIC BLOOD PRESSURE: 66 MMHG

## 2020-01-07 DIAGNOSIS — D50.8 IRON DEFICIENCY ANEMIA SECONDARY TO INADEQUATE DIETARY IRON INTAKE: ICD-10-CM

## 2020-01-07 DIAGNOSIS — D69.6 THROMBOCYTOPENIA, UNSPECIFIED (HCC): ICD-10-CM

## 2020-01-07 PROCEDURE — G8420 CALC BMI NORM PARAMETERS: HCPCS | Performed by: NURSE PRACTITIONER

## 2020-01-07 PROCEDURE — G8427 DOCREV CUR MEDS BY ELIG CLIN: HCPCS | Performed by: NURSE PRACTITIONER

## 2020-01-07 PROCEDURE — 1123F ACP DISCUSS/DSCN MKR DOCD: CPT | Performed by: NURSE PRACTITIONER

## 2020-01-07 PROCEDURE — 82746 ASSAY OF FOLIC ACID SERUM: CPT

## 2020-01-07 PROCEDURE — G8400 PT W/DXA NO RESULTS DOC: HCPCS | Performed by: NURSE PRACTITIONER

## 2020-01-07 PROCEDURE — 4040F PNEUMOC VAC/ADMIN/RCVD: CPT | Performed by: NURSE PRACTITIONER

## 2020-01-07 PROCEDURE — 1036F TOBACCO NON-USER: CPT | Performed by: NURSE PRACTITIONER

## 2020-01-07 PROCEDURE — 83550 IRON BINDING TEST: CPT

## 2020-01-07 PROCEDURE — 85025 COMPLETE CBC W/AUTO DIFF WBC: CPT

## 2020-01-07 PROCEDURE — 83540 ASSAY OF IRON: CPT

## 2020-01-07 PROCEDURE — 3017F COLORECTAL CA SCREEN DOC REV: CPT | Performed by: NURSE PRACTITIONER

## 2020-01-07 PROCEDURE — 1090F PRES/ABSN URINE INCON ASSESS: CPT | Performed by: NURSE PRACTITIONER

## 2020-01-07 PROCEDURE — 36415 COLL VENOUS BLD VENIPUNCTURE: CPT

## 2020-01-07 PROCEDURE — G8484 FLU IMMUNIZE NO ADMIN: HCPCS | Performed by: NURSE PRACTITIONER

## 2020-01-07 PROCEDURE — 82728 ASSAY OF FERRITIN: CPT

## 2020-01-07 PROCEDURE — 99213 OFFICE O/P EST LOW 20 MIN: CPT | Performed by: NURSE PRACTITIONER

## 2020-01-07 PROCEDURE — 82607 VITAMIN B-12: CPT

## 2020-01-07 PROCEDURE — 99211 OFF/OP EST MAY X REQ PHY/QHP: CPT

## 2020-01-07 PROCEDURE — 80053 COMPREHEN METABOLIC PANEL: CPT

## 2020-01-07 ASSESSMENT — ENCOUNTER SYMPTOMS
SORE THROAT: 0
WHEEZING: 0
COUGH: 0
CONSTIPATION: 0
EYE REDNESS: 0
BACK PAIN: 0
NAUSEA: 1
SHORTNESS OF BREATH: 0
EYE PAIN: 0
BLOOD IN STOOL: 0
RESPIRATORY NEGATIVE: 1
VOMITING: 0
EYES NEGATIVE: 1
EYE DISCHARGE: 0
DIARRHEA: 0
ABDOMINAL PAIN: 0

## 2020-01-07 NOTE — PROGRESS NOTES
Progress Note      Pt Name: Lisseth Garcia  YOB: 1945  MRN: [id number]    Date of evaluation: 1/07/2020  History Obtained From:  patient, electronic medical record    CHIEF COMPLAINT:    Chief Complaint   Patient presents with    Other     Thrombocytopenia, unspecified     Fatigue     HISTORY OF PRESENT ILLNESS:    Lisseth Garcia is a 69 y.o.  female with significant PMH of suspected ITP and anemia. Jamar Le continues to be treated with Promacta at a dose reduction of 25 mg daily. She returns today in a scheduled 4-week follow-up appointment. Jamar Le presents with no new complaints other than her chronic fatigue and intermittent nausea secondary to the Promacta that is overall controlled with Zofran. CBC today reveals a stable platelet count of 215,792. Based on dose adjustment guidelines for Promacta as documented below, Jamar Le needs to decrease her dosing again. We will decrease the Promacta to 12.5 mg daily and continue with close CBC monitoring. If her platelet count drops below 150,000 then we will increase the dosing back to 25 mg daily. Her hemoglobin remains stable at 10.4 with an MCV of 93.9. Iron substrates were last evaluated on 9/24/2019 with a TIBC of 362, iron saturation 26%, iron 94, ferritin 52.5, folate 5.52 and a vitamin B12 of 270. I will repeat iron substrates today. [place] is also followed by Dr. Nidia Alexander for suspected diagnosis of Sjogern's and is presently taking hydroxychoroquine 300 mg daily. She reports that she is tolerating the hydroxychloroquine without difficulty. Promacta:        ONCOLOGIC HISTORY:   Diagnosis: Thrombocytopenia with differential diagnosis of ITP    Normocytic normochromic anemia   Sjogrens    Treatment summary:  Initiation of prednisone at 50 mg daily on 4/27/2017 and completed on 6/22/2017 after weekly dose titration of 10 mg weekly.    Prednisone 15 mg daily ×14 days, completed on Reticulocyte count 1.2   IgG 625   IgA 204   IgM 204    10/6/2017 occult stool positive ×1. Colonoscopy on 11/17/2017 by Dr. Elke Haji revealed diverticulosis in the sigmoid and descending colon. Localized mild inflammation was found in the descending colon secondary to ischemic colitis. Serology studies on 8/13/2018:  Iron 163   TIBC 343   Iron saturation 48%   Ferritin 269   Vitamin B12 319   Folate 6.0   Erythropoietin 8.3   Reticulocyte count 1%   Creatinine 1.06   GFR 53       Serology studies on 9/24/2019:  · Iron 94  · TIBC 362  · Iron saturation 26%  · Ferritin 52.5  · Folate 5.52  · Vitamin B12 270    Thrombocytopenia- no known identifiable cause of onset, differential diagnosis of ITP. Review of medications did not reveal any known medications that induce thrombocytopenia. [place] denied any bleeding tendencies or significant ecchymosis. Serum studies on 4/27/2017:     Anticardiolipin IgA and IgG <9, IgM 12   PTT 24, PT 9.7, and INR 0.9   Rheumatoid factor 20.8 (H)   MANUEL positive   CRP <0.3   ESR 4   hepatitis C antibody <0.1   HIV negative   M spike not observed     Ultrasound of the spleen on 4/28/2017 revealed no abnormalities and spleen measures 8.8 x 3.2 x 3.1 cm. Promacta 50 mg daily initiated on 5/2/2018. The goal is to achieve and maintain a platelet count equal to or greater than 50,000, to reduce the risk of bleeding. Promacta will be initiated at 50 mg daily. The Promacta, dose can be increased after 2 weeks of dosing with a max dosing of 75 mg daily. Promacta increased to 75 mg daily on 10/3/2018 for a platelet count of 05,054. Promacta decreased to 50 mg daily on 6/20/2019 for platelet count being > 200,000, platelets 296,620. History of lupus: [place] is followed by Dr. Kushal Mason in [place]. She denies any significant joint pain, fatigue, febrile illness, or skin lesions, these symptoms are common with lupus.  She reports a hiostory of hair loss and Procedure Laterality Date    APPENDECTOMY      BREAST SURGERY      FOOT SURGERY      HYSTERECTOMY      TUBAL LIGATION         Current Medications:    Current Outpatient Medications   Medication Sig Dispense Refill    metoprolol succinate (TOPROL XL) 50 MG extended release tablet Take 50 mg by mouth daily      lisinopril-hydrochlorothiazide (PRINZIDE;ZESTORETIC) 20-25 MG per tablet Take 1 tablet by mouth daily      hydroxychloroquine (PLAQUENIL) 200 MG tablet Take by mouth daily      eltrombopag olamine (PROMACTA) 50 MG TABS tablet Take 1 tablet by mouth daily 30 tablet 5    potassium chloride (KLOR-CON) 20 MEQ packet Take 20 mEq by mouth daily      ondansetron (ZOFRAN) 8 MG tablet Take 8 mg by mouth every 8 hours as needed for Nausea or Vomiting       No current facility-administered medications for this visit. Allergies: Allergies   Allergen Reactions    Ondansetron Hcl     Oxycodone-Aspirin Other (See Comments)       Social History:    Social History     Tobacco Use    Smoking status: Never Smoker    Smokeless tobacco: Never Used   Substance Use Topics    Alcohol use: Not Currently    Drug use: Never       Family History:   Family History   Problem Relation Age of Onset    Heart Disease Mother     Cancer Sister     Cancer Brother        Vitals:  Vitals:    01/07/20 1126   BP: 118/66   Pulse: 60   SpO2: 99%   Height: 5' (1.524 m)        Subjective   REVIEW OF SYSTEMS:   Review of Systems   Constitutional: Positive for fatigue. Negative for chills, diaphoresis and fever. HENT: Negative. Negative for congestion, ear pain, hearing loss, nosebleeds, sore throat and tinnitus. Eyes: Negative. Negative for pain, discharge and redness. Respiratory: Negative. Negative for cough, shortness of breath and wheezing. Cardiovascular: Negative. Negative for chest pain, palpitations and leg swelling. Gastrointestinal: Positive for nausea (Intermittent).  Negative for abdominal pain, x4 extremities   Skin:     General: Skin is warm. Coloration: Skin is not pale. Findings: No erythema or rash. Neurological:      Mental Status: She is alert and oriented to person, place, and time. Cranial Nerves: No cranial nerve deficit. Coordination: Coordination normal.   Psychiatric:         Behavior: Behavior normal.         Thought Content: Thought content normal.         Labs:  CBC today with a WBC 6.60, ANC 4.64, hemoglobin 10.4, MCV 93.9 and a platelet count of 170,889    ASSESSMENT/PLAN:      1. Chronic ITP (idiopathic thrombocytopenic purpura),continues to have a positive response with Promacta. Currently taking Promacta 25 mg daily with an excellent response and has a platelet count of 799,779. Dosing recommendations for Promacta suggest decreasing dose by half, will change prescription to 12.5 mg daily     - CBC Auto Differential; Future    2. Iron deficiency anemia secondary to inadequate dietary iron intake. Hemoglobin stable at 10.4 with an MCV of 93.9, asymptomatic. Iron substrates were last evaluated in September. Will be repeated today. -CMP, iron panel, ferritin, folate and vitamin B12 today    3. Nausea secondary to Promacta, overall controlled. - continue Zofran as needed         FOLLOW UP:  1. Follow-up appointment given for 4 weeks  2. Follow-up with other medical providers as recommended    Over 50% of the total visit time of 15 minutes in face to face encounter with the patient, out of which more than 50% of the time was spent in counseling patient  and coordination of care. Counseling included but was not limited to time spent reviewing labs, imaging studies/ treatment plan and answering questions.     Electronically signed by BETTINA Marshall on 1/7/2020 at 6:25 PM

## 2020-02-25 ENCOUNTER — OFFICE VISIT (OUTPATIENT)
Dept: HEMATOLOGY | Age: 75
End: 2020-02-25
Payer: MEDICARE

## 2020-02-25 ENCOUNTER — HOSPITAL ENCOUNTER (OUTPATIENT)
Dept: INFUSION THERAPY | Age: 75
Discharge: HOME | End: 2020-02-25
Payer: MEDICARE

## 2020-02-25 VITALS
OXYGEN SATURATION: 99 % | WEIGHT: 106.7 LBS | BODY MASS INDEX: 20.95 KG/M2 | DIASTOLIC BLOOD PRESSURE: 60 MMHG | HEART RATE: 72 BPM | HEIGHT: 60 IN | SYSTOLIC BLOOD PRESSURE: 112 MMHG

## 2020-02-25 DIAGNOSIS — D69.6 THROMBOCYTOPENIA, UNSPECIFIED (HCC): ICD-10-CM

## 2020-02-25 DIAGNOSIS — D50.8 IRON DEFICIENCY ANEMIA SECONDARY TO INADEQUATE DIETARY IRON INTAKE: ICD-10-CM

## 2020-02-25 PROCEDURE — G8484 FLU IMMUNIZE NO ADMIN: HCPCS | Performed by: NURSE PRACTITIONER

## 2020-02-25 PROCEDURE — 99212 OFFICE O/P EST SF 10 MIN: CPT | Performed by: NURSE PRACTITIONER

## 2020-02-25 PROCEDURE — 4040F PNEUMOC VAC/ADMIN/RCVD: CPT | Performed by: NURSE PRACTITIONER

## 2020-02-25 PROCEDURE — G8400 PT W/DXA NO RESULTS DOC: HCPCS | Performed by: NURSE PRACTITIONER

## 2020-02-25 PROCEDURE — 1123F ACP DISCUSS/DSCN MKR DOCD: CPT | Performed by: NURSE PRACTITIONER

## 2020-02-25 PROCEDURE — 3017F COLORECTAL CA SCREEN DOC REV: CPT | Performed by: NURSE PRACTITIONER

## 2020-02-25 PROCEDURE — 99211 OFF/OP EST MAY X REQ PHY/QHP: CPT

## 2020-02-25 PROCEDURE — G8420 CALC BMI NORM PARAMETERS: HCPCS | Performed by: NURSE PRACTITIONER

## 2020-02-25 PROCEDURE — 1036F TOBACCO NON-USER: CPT | Performed by: NURSE PRACTITIONER

## 2020-02-25 PROCEDURE — G8427 DOCREV CUR MEDS BY ELIG CLIN: HCPCS | Performed by: NURSE PRACTITIONER

## 2020-02-25 PROCEDURE — 85025 COMPLETE CBC W/AUTO DIFF WBC: CPT

## 2020-02-25 PROCEDURE — 1090F PRES/ABSN URINE INCON ASSESS: CPT | Performed by: NURSE PRACTITIONER

## 2020-02-25 ASSESSMENT — ENCOUNTER SYMPTOMS
CONSTIPATION: 0
BLOOD IN STOOL: 0
ABDOMINAL PAIN: 0
EYE REDNESS: 0
EYES NEGATIVE: 1
EYE PAIN: 0
SHORTNESS OF BREATH: 0
DIARRHEA: 0
WHEEZING: 0
BACK PAIN: 0
SORE THROAT: 0
VOMITING: 0
EYE DISCHARGE: 0
COUGH: 0
NAUSEA: 1
RESPIRATORY NEGATIVE: 1

## 2020-02-25 NOTE — PROGRESS NOTES
Sherif on 5/26/2017 and he is suggesting that [place] has a differential diagnosis of Sjogern's. Serum studies on 4/27/2017  ds- DNA antibody 1   Aguilar antibodies <0.2   Sjogren's antibodies (SSA) > 8.0 (H)   Sjogren's antibodies (SSB) 5.8 (H)   Antichromatin antibodies <0.2   C4 20   Centromere B antibody <0.2    Bone marrow aspiration biopsy on 4/17/2018 documented low normal cellular bone marrow for age (~20-30%) with trilineage hematopoiesis and a blast count of ~1%. Many small lymphoid aggregates with mixed CD3+ T and CD20+ B-cells seen on clot section (likely representing underlying autoimmune disease). No morphological or cytogenic (FISH) evidence of a myelodysplastic syndrome. No diagnostic evidence of lymphoroliferative disorder, granulomas or metastatic malignancy. Normal female karyotype. Thrombocytopenia along with adequate number of megakaryocytes is consistent with peripheral destruction/sequestration of platelets. Dr. Layla Crisostomo has recommended initiation of Promacta. The goal is to achieve and maintain a platelet count equal to or greater than 50,000, to reduce the risk of bleeding. Promacta will be initiated at 50 mg daily. The Promacta, dose can be increased after 2 weeks of dosing with a max dosing of 75 mg daily. [place] is knowledgeable of the possible side effects to include but not limited to fatigue, headache, insomnia, pruritus, anemia, and hepatotoxicity.     Past Medical History:    Past Medical History:   Diagnosis Date    Arrhythmia     CAD (coronary artery disease)     Hyperlipidemia     Hypertension     Immune thrombocytopenic purpura ([facility] 7/26/2019    Sleep apnea     Systemic lupus erythematosus ([facility] 7/26/2019    Thrombocytopenia, unspecified ([facility] 7/26/2019       Past Surgical History:    Past Surgical History:   Procedure Laterality Date    APPENDECTOMY      BREAST SURGERY      FOOT SURGERY      HYSTERECTOMY      TUBAL LIGATION         Current Medications: Current Outpatient Medications   Medication Sig Dispense Refill    eltrombopag (PROMACTA) 12.5 MG TABS tablet Take 1 tablet by mouth daily 30 tablet 11    metoprolol succinate (TOPROL XL) 50 MG extended release tablet Take 50 mg by mouth daily      lisinopril-hydrochlorothiazide (PRINZIDE;ZESTORETIC) 20-25 MG per tablet Take 1 tablet by mouth daily      hydroxychloroquine (PLAQUENIL) 200 MG tablet Take by mouth daily       No current facility-administered medications for this visit. Allergies: Allergies   Allergen Reactions    Ondansetron Hcl     Oxycodone-Aspirin Other (See Comments)       Social History:    Social History     Tobacco Use    Smoking status: Never Smoker    Smokeless tobacco: Never Used   Substance Use Topics    Alcohol use: Not Currently    Drug use: Never       Family History:   Family History   Problem Relation Age of Onset    Heart Disease Mother     Cancer Sister     Cancer Brother        Vitals:  Vitals:    02/25/20 1013   BP: 112/60   Pulse: 72   SpO2: 99%   Weight: 106 lb 11.2 oz (48.4 kg)   Height: 5' (1.524 m)        Subjective   REVIEW OF SYSTEMS:   Review of Systems   Constitutional: Positive for fatigue. Negative for chills, diaphoresis and fever. HENT: Negative. Negative for congestion, ear pain, hearing loss, nosebleeds, sore throat and tinnitus. Eyes: Negative. Negative for pain, discharge and redness. Respiratory: Negative. Negative for cough, shortness of breath and wheezing. Cardiovascular: Negative. Negative for chest pain, palpitations and leg swelling. Gastrointestinal: Positive for nausea (Intermittent). Negative for abdominal pain, blood in stool, constipation, diarrhea and vomiting. Endocrine: Negative for polydipsia. Genitourinary: Negative for dysuria, flank pain, frequency, hematuria and urgency. Musculoskeletal: Negative. Negative for back pain, myalgias and neck pain. Skin: Negative. Negative for rash.    Neurological: Negative. Negative for dizziness, tremors, seizures, weakness and headaches. Hematological: Does not bruise/bleed easily. Psychiatric/Behavioral: Negative. The patient is not nervous/anxious. Objective   PHYSICAL EXAM:  Physical Exam  Vitals signs reviewed. Constitutional:       General: She is not in acute distress. Appearance: She is well-developed. She is not diaphoretic. HENT:      Head: Normocephalic and atraumatic. Mouth/Throat:      Pharynx: Uvula midline. Tonsils: No tonsillar exudate. Eyes:      General: Lids are normal. No scleral icterus. Right eye: No discharge. Left eye: No discharge. Conjunctiva/sclera: Conjunctivae normal.      Pupils: Pupils are equal, round, and reactive to light. Neck:      Musculoskeletal: Normal range of motion and neck supple. Thyroid: No thyroid mass or thyromegaly. Vascular: No JVD. Trachea: Trachea normal. No tracheal deviation. Cardiovascular:      Rate and Rhythm: Normal rate and regular rhythm. Heart sounds: Normal heart sounds. No murmur. No friction rub. No gallop. Pulmonary:      Effort: Pulmonary effort is normal. No respiratory distress. Breath sounds: Normal breath sounds. No wheezing or rales. Chest:      Chest wall: No tenderness. Abdominal:      General: Bowel sounds are normal. There is no distension. Palpations: Abdomen is soft. There is no mass. Tenderness: There is no abdominal tenderness. There is no guarding or rebound. Hernia: No hernia is present. Musculoskeletal:         General: No tenderness or deformity. Comments: Range of motion within normal limits x4 extremities   Skin:     General: Skin is warm. Coloration: Skin is not pale. Findings: No erythema or rash. Neurological:      Mental Status: She is alert and oriented to person, place, and time. Cranial Nerves: No cranial nerve deficit.       Coordination: Coordination normal. Psychiatric:         Behavior: Behavior normal.         Thought Content: Thought content normal.         Labs:  CBC WBC 5.69, ANC 3.83, hemoglobin 11.1, MCV 93 and a platelet count of 313,743    ASSESSMENT/PLAN:      1. Chronic ITP (idiopathic thrombocytopenic purpura),continues to have a positive response with Promacta. Promacta decreased to 12.5 mg on 1/21/2020. Platelet count remains within acceptable limits 185,000. Continue Promacta 12.5 mg daily, if platelet count drops below 150,000 will resume Promacta at 25 mg daily.    - CBC Auto Differential; Future    2. Iron deficiency anemia secondary to inadequate dietary iron intake. Hemoglobin improved to 11.1 with an MCV of 93.0. Iron substrates within acceptable limits on 1/7/2020.      3. Nausea secondary to Promacta, overall controlled. Continue Zofran as needed. FOLLOW UP:  1. Follow-up appointment given for 3 weeks  2.  Follow-up with other medical providers as recommended      Electronically signed by BETTINA Clement on 2/26/2020 at 2:47 PM

## 2020-03-17 ENCOUNTER — HOSPITAL ENCOUNTER (OUTPATIENT)
Dept: INFUSION THERAPY | Age: 75
Discharge: HOME | End: 2020-03-17
Payer: MEDICARE

## 2020-03-17 ENCOUNTER — OFFICE VISIT (OUTPATIENT)
Dept: HEMATOLOGY | Age: 75
End: 2020-03-17
Payer: MEDICARE

## 2020-03-17 VITALS
BODY MASS INDEX: 21.46 KG/M2 | HEIGHT: 60 IN | HEART RATE: 87 BPM | OXYGEN SATURATION: 98 % | SYSTOLIC BLOOD PRESSURE: 122 MMHG | DIASTOLIC BLOOD PRESSURE: 68 MMHG | WEIGHT: 109.3 LBS

## 2020-03-17 DIAGNOSIS — D69.6 THROMBOCYTOPENIA, UNSPECIFIED (HCC): ICD-10-CM

## 2020-03-17 DIAGNOSIS — D50.8 IRON DEFICIENCY ANEMIA SECONDARY TO INADEQUATE DIETARY IRON INTAKE: ICD-10-CM

## 2020-03-17 PROCEDURE — G8420 CALC BMI NORM PARAMETERS: HCPCS | Performed by: NURSE PRACTITIONER

## 2020-03-17 PROCEDURE — G8400 PT W/DXA NO RESULTS DOC: HCPCS | Performed by: NURSE PRACTITIONER

## 2020-03-17 PROCEDURE — 99212 OFFICE O/P EST SF 10 MIN: CPT | Performed by: NURSE PRACTITIONER

## 2020-03-17 PROCEDURE — 99211 OFF/OP EST MAY X REQ PHY/QHP: CPT

## 2020-03-17 PROCEDURE — G8427 DOCREV CUR MEDS BY ELIG CLIN: HCPCS | Performed by: NURSE PRACTITIONER

## 2020-03-17 PROCEDURE — 1123F ACP DISCUSS/DSCN MKR DOCD: CPT | Performed by: NURSE PRACTITIONER

## 2020-03-17 PROCEDURE — G8484 FLU IMMUNIZE NO ADMIN: HCPCS | Performed by: NURSE PRACTITIONER

## 2020-03-17 PROCEDURE — 1090F PRES/ABSN URINE INCON ASSESS: CPT | Performed by: NURSE PRACTITIONER

## 2020-03-17 PROCEDURE — 1036F TOBACCO NON-USER: CPT | Performed by: NURSE PRACTITIONER

## 2020-03-17 PROCEDURE — 85025 COMPLETE CBC W/AUTO DIFF WBC: CPT

## 2020-03-17 PROCEDURE — 3017F COLORECTAL CA SCREEN DOC REV: CPT | Performed by: NURSE PRACTITIONER

## 2020-03-17 PROCEDURE — 4040F PNEUMOC VAC/ADMIN/RCVD: CPT | Performed by: NURSE PRACTITIONER

## 2020-03-17 ASSESSMENT — ENCOUNTER SYMPTOMS
SORE THROAT: 0
CONSTIPATION: 0
EYE PAIN: 0
GASTROINTESTINAL NEGATIVE: 1
RESPIRATORY NEGATIVE: 1
VOMITING: 0
BACK PAIN: 0
NAUSEA: 0
EYES NEGATIVE: 1
COUGH: 0
BLOOD IN STOOL: 0
EYE REDNESS: 0
WHEEZING: 0
DIARRHEA: 0
EYE DISCHARGE: 0
SHORTNESS OF BREATH: 0
ABDOMINAL PAIN: 0

## 2020-03-17 NOTE — PROGRESS NOTES
Sjogrens    Treatment summary:  Initiation of prednisone at 50 mg daily on 4/27/2017 and completed on 6/22/2017 after weekly dose titration of 10 mg weekly. Prednisone 15 mg daily ×14 days, completed on 11/18/2017 for planned colonoscopy for positive occult stools. 5/2/2018 -initiation of Promacta 50 mg by mouth daily. Dose increased to 75 mg daily on 10/3/2018.   6/21/2019- decreased Promacta dosing to 50 mg   11/25/2019-decrease Promacta to 25 mg daily  1/21/2020-decrease Promacta 12.5 mg daily    HEMATOLOGY HISTORY:  Jamar Le was seen in initial hematology consultation on 4/27/2017 for new onset of thrombocytopenia and anemia in referral from Dr. Kyleigh Duncan. Jamar Le has a history of lupus and a history of acute blood loss secondary to a vaginal hysterectomy with anterior posterior vaginal repair, and cystoscopy on 12/14/2016. She is followed by Dr. Hector Segura in [place] for her lupus and denies any significant flareups. She denies any previous history of thrombocytopenia or bleeding tendencies. Review of her present medication list did not reveal any medications that cause significant thrombocytopenia and she denies drinking any tonic water. CBC HISTORY:  12/19/2016 - WBC 8.36, hemoglobin 7.4, hematocrit 20.5, MCV 79.2 and a platelet count of 663,783.   12/20/2016 - WBC 4.67, neutrophils 80.9%, hemoglobin 10.2, hematocrit 29, MCV 82.6 and a platelet count of 731,456.   12/22/2016 - WBC 6.4, neutrophils 70.8%, hemoglobin 10, hematocrit 29.8, MCV 84.7, and a platelet count 689,672.   12/28/2016 - WBC 6.6, neutrophils 66.2%, hemoglobin 12.3, hematocrit 37.1, MCV 86, and a platelet count of 15,639.   4/17/2017 4/27/2017- WBC 5.17, neutrophils 59%, hemoglobin 10.3, hematocrit 33.3, MCV 92.5 and a platelet count of 51,398. Normocytic, normochromic anemia- with a history of acute blood loss (12/2016). Hemoglobin has stabilized.   Serum studies on 4/27/2017:  Iron 50   TIBC 276   Iron saturation 18%   Ferritin 214,000. History of lupus: [place] is followed by Dr. Theresa Sabillon in [place]. She denies any significant joint pain, fatigue, febrile illness, or skin lesions, these symptoms are common with lupus. She reports a hiostory of hair loss and this symptom can be associated with lupus. She denies any recent flareups or lab testing related to her lupus diagnosis. [place] was evaluated by Dr. Sheela Lagunas on 5/26/2017 and he is suggesting that [place] has a differential diagnosis of Sjogern's. Serum studies on 4/27/2017  ds- DNA antibody 1   Aguilar antibodies <0.2   Sjogren's antibodies (SSA) > 8.0 (H)   Sjogren's antibodies (SSB) 5.8 (H)   Antichromatin antibodies <0.2   C4 20   Centromere B antibody <0.2    Bone marrow aspiration biopsy on 4/17/2018 documented low normal cellular bone marrow for age (~20-30%) with trilineage hematopoiesis and a blast count of ~1%. Many small lymphoid aggregates with mixed CD3+ T and CD20+ B-cells seen on clot section (likely representing underlying autoimmune disease). No morphological or cytogenic (FISH) evidence of a myelodysplastic syndrome. No diagnostic evidence of lymphoroliferative disorder, granulomas or metastatic malignancy. Normal female karyotype. Thrombocytopenia along with adequate number of megakaryocytes is consistent with peripheral destruction/sequestration of platelets. Dr. Emeterio Groves has recommended initiation of Promacta. The goal is to achieve and maintain a platelet count equal to or greater than 50,000, to reduce the risk of bleeding. Promacta will be initiated at 50 mg daily. The Promacta, dose can be increased after 2 weeks of dosing with a max dosing of 75 mg daily. [place] is knowledgeable of the possible side effects to include but not limited to fatigue, headache, insomnia, pruritus, anemia, and hepatotoxicity.     Past Medical History:    Past Medical History:   Diagnosis Date    Arrhythmia     CAD (coronary artery disease)     Hyperlipidemia  Hypertension     Immune thrombocytopenic purpura ([facility] 7/26/2019    Sleep apnea     Systemic lupus erythematosus ([facility] 7/26/2019    Thrombocytopenia, unspecified ([facility] 7/26/2019       Past Surgical History:    Past Surgical History:   Procedure Laterality Date    APPENDECTOMY      BREAST SURGERY      FOOT SURGERY      HYSTERECTOMY      TUBAL LIGATION         Current Medications:    Current Outpatient Medications   Medication Sig Dispense Refill    eltrombopag (PROMACTA) 12.5 MG TABS tablet Take 1 tablet by mouth daily 30 tablet 11    metoprolol succinate (TOPROL XL) 50 MG extended release tablet Take 50 mg by mouth daily      lisinopril-hydrochlorothiazide (PRINZIDE;ZESTORETIC) 20-25 MG per tablet Take 1 tablet by mouth daily      hydroxychloroquine (PLAQUENIL) 200 MG tablet Take by mouth daily       No current facility-administered medications for this visit. Allergies: Allergies   Allergen Reactions    Ondansetron Hcl     Oxycodone-Aspirin Other (See Comments)       Social History:    Social History     Tobacco Use    Smoking status: Never Smoker    Smokeless tobacco: Never Used   Substance Use Topics    Alcohol use: Not Currently    Drug use: Never       Family History:   Family History   Problem Relation Age of Onset    Heart Disease Mother     Cancer Sister     Cancer Brother        Vitals:  Vitals:    03/17/20 1032   BP: 122/68   Pulse: 87   SpO2: 98%   Weight: 109 lb 4.8 oz (49.6 kg)   Height: 5' (1.524 m)        Subjective   REVIEW OF SYSTEMS:   Review of Systems   Constitutional: Positive for fatigue. Negative for chills, diaphoresis and fever. HENT: Negative. Negative for congestion, ear pain, hearing loss, nosebleeds, sore throat and tinnitus. Eyes: Negative. Negative for pain, discharge and redness. Respiratory: Negative. Negative for cough, shortness of breath and wheezing. Cardiovascular: Negative. Negative for chest pain, palpitations and leg swelling.

## 2020-04-15 ENCOUNTER — HOSPITAL ENCOUNTER (OUTPATIENT)
Dept: INFUSION THERAPY | Age: 75
Discharge: HOME | End: 2020-04-15
Payer: MEDICARE

## 2020-04-15 ENCOUNTER — OFFICE VISIT (OUTPATIENT)
Dept: HEMATOLOGY | Age: 75
End: 2020-04-15
Payer: MEDICARE

## 2020-04-15 VITALS
DIASTOLIC BLOOD PRESSURE: 74 MMHG | OXYGEN SATURATION: 98 % | SYSTOLIC BLOOD PRESSURE: 124 MMHG | BODY MASS INDEX: 21.99 KG/M2 | HEART RATE: 81 BPM | HEIGHT: 60 IN | TEMPERATURE: 97.5 F | WEIGHT: 112 LBS

## 2020-04-15 DIAGNOSIS — D69.6 THROMBOCYTOPENIA, UNSPECIFIED (HCC): ICD-10-CM

## 2020-04-15 DIAGNOSIS — D50.8 IRON DEFICIENCY ANEMIA SECONDARY TO INADEQUATE DIETARY IRON INTAKE: ICD-10-CM

## 2020-04-15 PROBLEM — R53.82 CHRONIC FATIGUE: Status: ACTIVE | Noted: 2020-04-15

## 2020-04-15 PROCEDURE — G8428 CUR MEDS NOT DOCUMENT: HCPCS | Performed by: NURSE PRACTITIONER

## 2020-04-15 PROCEDURE — 1123F ACP DISCUSS/DSCN MKR DOCD: CPT | Performed by: NURSE PRACTITIONER

## 2020-04-15 PROCEDURE — 85025 COMPLETE CBC W/AUTO DIFF WBC: CPT

## 2020-04-15 PROCEDURE — G8400 PT W/DXA NO RESULTS DOC: HCPCS | Performed by: NURSE PRACTITIONER

## 2020-04-15 PROCEDURE — 99211 OFF/OP EST MAY X REQ PHY/QHP: CPT

## 2020-04-15 PROCEDURE — 99212 OFFICE O/P EST SF 10 MIN: CPT | Performed by: NURSE PRACTITIONER

## 2020-04-15 PROCEDURE — 3017F COLORECTAL CA SCREEN DOC REV: CPT | Performed by: NURSE PRACTITIONER

## 2020-04-15 PROCEDURE — 4040F PNEUMOC VAC/ADMIN/RCVD: CPT | Performed by: NURSE PRACTITIONER

## 2020-04-15 PROCEDURE — 1090F PRES/ABSN URINE INCON ASSESS: CPT | Performed by: NURSE PRACTITIONER

## 2020-04-15 PROCEDURE — 1036F TOBACCO NON-USER: CPT | Performed by: NURSE PRACTITIONER

## 2020-04-15 PROCEDURE — G8420 CALC BMI NORM PARAMETERS: HCPCS | Performed by: NURSE PRACTITIONER

## 2020-04-15 ASSESSMENT — ENCOUNTER SYMPTOMS
EYE REDNESS: 0
EYES NEGATIVE: 1
NAUSEA: 0
BACK PAIN: 0
GASTROINTESTINAL NEGATIVE: 1
CONSTIPATION: 0
BLOOD IN STOOL: 0
ABDOMINAL PAIN: 0
EYE PAIN: 0
DIARRHEA: 0
COUGH: 0
SORE THROAT: 0
SHORTNESS OF BREATH: 0
EYE DISCHARGE: 0
RESPIRATORY NEGATIVE: 1
VOMITING: 0
WHEEZING: 0

## 2020-04-15 NOTE — PROGRESS NOTES
Thrombocyt[facility] ([facility] 7/26/2019       Past Surgical History:    Past Surgical History:   Procedure Laterality Date    APPENDECTOMY      BREAST SURGERY      FOOT SURGERY      HYSTERECTOMY      TUBAL LIGATION         Current Medications:    Current Outpatient Medications   Medication Sig Dispense Refill    eltrombopag (PROMACTA) 12.5 MG TABS tablet Take 1 tablet by mouth daily 30 tablet 11    metoprolol succinate (TOPROL XL) 50 MG extended release tablet Take 50 mg by mouth daily      lisinopril-hydrochlorothiazide (PRINZIDE;ZESTORETIC) 20-25 MG per tablet Take 1 tablet by mouth daily      hydroxychloroquine (PLAQUENIL) 200 MG tablet Take by mouth daily       No current facility-administered medications for this visit. Allergies: Allergies   Allergen Reactions    Ondansetron Hcl     Oxycodone-Aspirin Other (See Comments)       Social History:    Social History     Tobacco Use    Smoking status: Never Smoker    Smokeless tobacco: Never Used   Substance Use Topics    Alcohol use: Not Currently    Drug use: Never       Family History:   Family History   Problem Relation Age of Onset    Heart Disease Mother     Cancer Sister     Cancer Brother        Vitals:  Vitals:    04/15/20 1023   BP: 124/74   Pulse: 81   Temp: 97.5 °F (36.4 °C)   SpO2: 98%   Weight: 112 lb (50.8 kg)   Height: 5' (1.524 m)        Subjective   REVIEW OF SYSTEMS:   Review of Systems   Constitutional: Positive for fatigue. Negative for chills, diaphoresis and fever. HENT: Negative. Negative for congestion, ear pain, hearing loss, nosebleeds, sore throat and tinnitus. Eyes: Negative. Negative for pain, discharge and redness. Respiratory: Negative. Negative for cough, shortness of breath and wheezing. Cardiovascular: Negative. Negative for chest pain, palpitations and leg swelling. Gastrointestinal: Negative. Negative for abdominal pain, blood in stool, constipation, diarrhea, nausea and vomiting. Endocrine: Negative for polydipsia. Genitourinary: Negative for dysuria, flank pain, frequency, hematuria and urgency. Musculoskeletal: Negative. Negative for back pain, myalgias and neck pain. Skin: Negative. Negative for rash. Neurological: Negative. Negative for dizziness, tremors, seizures, weakness and headaches. Hematological: Does not bruise/bleed easily. Psychiatric/Behavioral: Negative. The patient is not nervous/anxious. Objective   PHYSICAL EXAM:  Physical Exam  Vitals signs reviewed. Constitutional:       General: She is not in acute distress. Appearance: She is well-developed. She is not diaphoretic. HENT:      Head: Normocephalic and atraumatic. Mouth/Throat:      Pharynx: Uvula midline. Tonsils: No tonsillar exudate. Eyes:      General: Lids are normal. No scleral icterus. Right eye: No discharge. Left eye: No discharge. Conjunctiva/sclera: Conjunctivae normal.      Pupils: Pupils are equal, round, and reactive to light. Neck:      Musculoskeletal: Normal range of motion and neck supple. Thyroid: No thyroid mass or thyromegaly. Vascular: No JVD. Trachea: Trachea normal. No tracheal deviation. Cardiovascular:      Rate and Rhythm: Normal rate and regular rhythm. Heart sounds: Normal heart sounds. No murmur. No friction rub. No gallop. Pulmonary:      Effort: Pulmonary effort is normal. No respiratory distress. Breath sounds: Normal breath sounds. No wheezing or rales. Chest:      Chest wall: No tenderness. Abdominal:      General: Bowel sounds are normal. There is no distension. Palpations: Abdomen is soft. There is no mass. Tenderness: There is no abdominal tenderness. There is no guarding or rebound. Hernia: No hernia is present. Musculoskeletal:         General: No tenderness or deformity.       Comments: Range of motion within normal limits x4 extremities   Skin:     General: Skin is warm.      Coloration: Skin is not pale. Findings: No erythema or rash. Neurological:      Mental Status: She is alert and oriented to person, place, and time. Cranial Nerves: No cranial nerve deficit. Coordination: Coordination normal.   Psychiatric:         Behavior: Behavior normal.         Thought Content: Thought content normal.         Labs:  CBC: WBC 6.13, ANC 4.29, hemoglobin 10.5, MCV 94.8 and a platelet count of 099,206    ASSESSMENT/PLAN:      1. Chronic ITP (idiopathic thrombocytopenic purpura), continues to have a positive response with Promacta. We will continue Promacta 12.5 mg daily. If platelet count drops below 150,000 will increase Promacta to 25 mg daily.   - CBC Auto Differential; Future    2. Iron deficiency anemia secondary to inadequate dietary iron intake. Hemoglobin stable at 10.5 with an MCV of 94.8. Iron substrates within acceptable limits on 1/7/2020.    -If hemoglobin continues to decline at follow-up will repeat iron substrates and consider iron replacement    3. Nausea secondary to Promacta, resolved. FOLLOW UP:  1. Follow-up appointment given for 6 weeks, sooner if needed  2. Follow-up with other medical providers as recommended    Discussed precautions related to [address] and being at increased risk. Discussed proper handwashing to be done frequently, limit exposure to other individuals and maintain social distancing of 6 feet. Recommend contacting primary care provider if having respiratory symptoms for further recommendations and consideration for testing. Over 50% of the total visit time of 10 minutes in face to face encounter with the patient, out of which more than 50% of the time was spent in counseling patient  and coordination of care. Counseling included but was not limited to time spent reviewing labs, imaging studies/ treatment plan and answering questions. This does not include charting.       Electronically signed by Clotilda Meigs, APRN on 4/15/2020 at 11:11 AM

## 2020-06-03 ENCOUNTER — HOSPITAL ENCOUNTER (OUTPATIENT)
Dept: INFUSION THERAPY | Age: 75
Discharge: HOME | End: 2020-06-03
Payer: MEDICARE

## 2020-06-03 ENCOUNTER — OFFICE VISIT (OUTPATIENT)
Dept: HEMATOLOGY | Age: 75
End: 2020-06-03
Payer: MEDICARE

## 2020-06-03 VITALS
HEIGHT: 60 IN | SYSTOLIC BLOOD PRESSURE: 120 MMHG | OXYGEN SATURATION: 99 % | TEMPERATURE: 98.1 F | WEIGHT: 108.8 LBS | HEART RATE: 69 BPM | BODY MASS INDEX: 21.36 KG/M2 | DIASTOLIC BLOOD PRESSURE: 74 MMHG

## 2020-06-03 DIAGNOSIS — D50.8 IRON DEFICIENCY ANEMIA SECONDARY TO INADEQUATE DIETARY IRON INTAKE: ICD-10-CM

## 2020-06-03 DIAGNOSIS — D69.3 CHRONIC ITP (IDIOPATHIC THROMBOCYTOPENIC PURPURA) (HCC): ICD-10-CM

## 2020-06-03 LAB
ALBUMIN SERPL-MCNC: 4.9 G/DL (ref 3.5–5.2)
ALP BLD-CCNC: 62 U/L (ref 35–104)
ALT SERPL-CCNC: 16 U/L (ref 9–52)
ANION GAP SERPL CALCULATED.3IONS-SCNC: 13 MMOL/L (ref 7–19)
AST SERPL-CCNC: 31 U/L (ref 14–36)
BASOPHILS ABSOLUTE: 0.04 K/UL (ref 0.01–0.08)
BASOPHILS RELATIVE PERCENT: 0.8 % (ref 0.1–1.2)
BILIRUB SERPL-MCNC: 0.4 MG/DL (ref 0.2–1.3)
BUN BLDV-MCNC: 19 MG/DL (ref 7–17)
CALCIUM SERPL-MCNC: 9.6 MG/DL (ref 8.4–10.2)
CHLORIDE BLD-SCNC: 98 MMOL/L (ref 98–111)
CO2: 25 MMOL/L (ref 22–29)
CREAT SERPL-MCNC: 1 MG/DL (ref 0.5–1)
EOSINOPHILS ABSOLUTE: 0.17 K/UL (ref 0.04–0.54)
EOSINOPHILS RELATIVE PERCENT: 3.4 % (ref 0.7–7)
FERRITIN: 33.2 NG/ML (ref 11.1–264)
GFR NON-AFRICAN AMERICAN: 54
GLOBULIN: 2 G/DL
GLUCOSE BLD-MCNC: 105 MG/DL (ref 74–106)
HCT VFR BLD CALC: 32.9 % (ref 34.1–44.9)
HEMOGLOBIN: 10.4 G/DL (ref 11.2–15.7)
IRON SATURATION: 19 % (ref 14–50)
IRON: 80 UG/DL (ref 37–170)
LYMPHOCYTES ABSOLUTE: 1.21 K/UL (ref 1.18–3.74)
LYMPHOCYTES RELATIVE PERCENT: 23.9 % (ref 19.3–53.1)
MCH RBC QN AUTO: 30.1 PG (ref 25.6–32.2)
MCHC RBC AUTO-ENTMCNC: 31.6 G/DL (ref 32.3–35.5)
MCV RBC AUTO: 95.1 FL (ref 79.4–94.8)
MONOCYTES ABSOLUTE: 0.52 K/UL (ref 0.24–0.82)
MONOCYTES RELATIVE PERCENT: 10.3 % (ref 4.7–12.5)
NEUTROPHILS ABSOLUTE: 3.13 K/UL (ref 1.56–6.13)
NEUTROPHILS RELATIVE PERCENT: 61.6 % (ref 34–71.1)
PDW BLD-RTO: 12.6 % (ref 11.7–14.4)
PLATELET # BLD: 233 K/UL (ref 182–369)
PMV BLD AUTO: 9.9 FL (ref 7.4–10.4)
POTASSIUM SERPL-SCNC: 3.7 MMOL/L (ref 3.5–5.1)
RBC # BLD: 3.46 M/UL (ref 3.93–5.22)
SODIUM BLD-SCNC: 136 MMOL/L (ref 137–145)
TOTAL IRON BINDING CAPACITY: 419 UG/DL (ref 265–497)
TOTAL PROTEIN: 6.9 G/DL (ref 6.3–8.2)
WBC # BLD: 5.07 K/UL (ref 3.98–10.04)

## 2020-06-03 PROCEDURE — 85025 COMPLETE CBC W/AUTO DIFF WBC: CPT

## 2020-06-03 PROCEDURE — G8420 CALC BMI NORM PARAMETERS: HCPCS | Performed by: NURSE PRACTITIONER

## 2020-06-03 PROCEDURE — 80053 COMPREHEN METABOLIC PANEL: CPT

## 2020-06-03 PROCEDURE — 1123F ACP DISCUSS/DSCN MKR DOCD: CPT | Performed by: NURSE PRACTITIONER

## 2020-06-03 PROCEDURE — 82728 ASSAY OF FERRITIN: CPT

## 2020-06-03 PROCEDURE — 3017F COLORECTAL CA SCREEN DOC REV: CPT | Performed by: NURSE PRACTITIONER

## 2020-06-03 PROCEDURE — 99213 OFFICE O/P EST LOW 20 MIN: CPT | Performed by: NURSE PRACTITIONER

## 2020-06-03 PROCEDURE — G8427 DOCREV CUR MEDS BY ELIG CLIN: HCPCS | Performed by: NURSE PRACTITIONER

## 2020-06-03 PROCEDURE — 83540 ASSAY OF IRON: CPT

## 2020-06-03 PROCEDURE — 83550 IRON BINDING TEST: CPT

## 2020-06-03 PROCEDURE — 99211 OFF/OP EST MAY X REQ PHY/QHP: CPT

## 2020-06-03 PROCEDURE — 1090F PRES/ABSN URINE INCON ASSESS: CPT | Performed by: NURSE PRACTITIONER

## 2020-06-03 PROCEDURE — 4040F PNEUMOC VAC/ADMIN/RCVD: CPT | Performed by: NURSE PRACTITIONER

## 2020-06-03 PROCEDURE — G8400 PT W/DXA NO RESULTS DOC: HCPCS | Performed by: NURSE PRACTITIONER

## 2020-06-03 PROCEDURE — 1036F TOBACCO NON-USER: CPT | Performed by: NURSE PRACTITIONER

## 2020-06-03 ASSESSMENT — ENCOUNTER SYMPTOMS
WHEEZING: 0
EYE REDNESS: 0
GASTROINTESTINAL NEGATIVE: 1
RESPIRATORY NEGATIVE: 1
SHORTNESS OF BREATH: 0
EYE PAIN: 0
VOMITING: 0
SORE THROAT: 0
ABDOMINAL PAIN: 0
BLOOD IN STOOL: 0
EYES NEGATIVE: 1
EYE DISCHARGE: 0
DIARRHEA: 0
BACK PAIN: 0
COUGH: 0
NAUSEA: 0
CONSTIPATION: 0

## 2020-06-03 NOTE — PROGRESS NOTES
inflammation was found in the descending colon secondary to ischemic colitis. Serology studies on 8/13/2018:  Iron 163   TIBC 343   Iron saturation 48%   Ferritin 269   Vitamin B12 319   Folate 6.0   Erythropoietin 8.3   Reticulocyte count 1%   Creatinine 1.06   GFR 53       Serology studies on 9/24/2019:  · Iron 94  · TIBC 362  · Iron saturation 26%  · Ferritin 52.5  · Folate 5.52  · Vitamin B12 270    Thrombocytopenia- no known identifiable cause of onset, differential diagnosis of ITP. Review of medications did not reveal any known medications that induce thrombocytopenia. [place] denied any bleeding tendencies or significant ecchymosis. Serum studies on 4/27/2017:     Anticardiolipin IgA and IgG <9, IgM 12   PTT 24, PT 9.7, and INR 0.9   Rheumatoid factor 20.8 (H)   MANUEL positive   CRP <0.3   ESR 4   hepatitis C antibody <0.1   HIV negative   M spike not observed     Ultrasound of the spleen on 4/28/2017 revealed no abnormalities and spleen measures 8.8 x 3.2 x 3.1 cm. Promacta 50 mg daily initiated on 5/2/2018. The goal is to achieve and maintain a platelet count equal to or greater than 50,000, to reduce the risk of bleeding. Promacta will be initiated at 50 mg daily. The Promacta, dose can be increased after 2 weeks of dosing with a max dosing of 75 mg daily. Promacta increased to 75 mg daily on 10/3/2018 for a platelet count of 70,965. Promacta decreased to 50 mg daily on 6/20/2019 for platelet count being > 200,000, platelets 032,401. History of lupus: [place] is followed by Dr. Jey Vickers in [place]. She denies any significant joint pain, fatigue, febrile illness, or skin lesions, these symptoms are common with lupus. She reports a hiostory of hair loss and this symptom can be associated with lupus. She denies any recent flareups or lab testing related to her lupus diagnosis.  [place] was evaluated by Dr. Tyler Nagy on 5/26/2017 and he is suggesting that [place] has a weakness and headaches. Hematological: Does not bruise/bleed easily. Psychiatric/Behavioral: Negative. The patient is not nervous/anxious. Objective   PHYSICAL EXAM:  Physical Exam  Vitals signs reviewed. Constitutional:       General: She is not in acute distress. Appearance: She is well-developed. She is not diaphoretic. HENT:      Head: Normocephalic and atraumatic. Mouth/Throat:      Pharynx: Uvula midline. Tonsils: No tonsillar exudate. Eyes:      General: Lids are normal. No scleral icterus. Right eye: No discharge. Left eye: No discharge. Conjunctiva/sclera: Conjunctivae normal.      Pupils: Pupils are equal, round, and reactive to light. Neck:      Musculoskeletal: Normal range of motion and neck supple. Thyroid: No thyroid mass or thyromegaly. Vascular: No JVD. Trachea: Trachea normal. No tracheal deviation. Cardiovascular:      Rate and Rhythm: Normal rate and regular rhythm. Heart sounds: Normal heart sounds. No murmur. No friction rub. No gallop. Pulmonary:      Effort: Pulmonary effort is normal. No respiratory distress. Breath sounds: Normal breath sounds. No wheezing or rales. Chest:      Chest wall: No tenderness. Abdominal:      General: Bowel sounds are normal. There is no distension. Palpations: Abdomen is soft. There is no mass. Tenderness: There is no abdominal tenderness. There is no guarding or rebound. Hernia: No hernia is present. Musculoskeletal:         General: No tenderness or deformity. Comments: Range of motion within normal limits x4 extremities   Skin:     General: Skin is warm. Coloration: Skin is not pale. Findings: No erythema or rash. Neurological:      Mental Status: She is alert and oriented to person, place, and time. Cranial Nerves: No cranial nerve deficit.       Coordination: Coordination normal.   Psychiatric:         Behavior: Behavior normal.

## 2020-07-15 ENCOUNTER — HOSPITAL ENCOUNTER (OUTPATIENT)
Dept: INFUSION THERAPY | Age: 75
Discharge: HOME | End: 2020-07-15
Payer: MEDICARE

## 2020-07-15 ENCOUNTER — OFFICE VISIT (OUTPATIENT)
Dept: HEMATOLOGY | Age: 75
End: 2020-07-15
Payer: MEDICARE

## 2020-07-15 VITALS
OXYGEN SATURATION: 98 % | HEIGHT: 60 IN | DIASTOLIC BLOOD PRESSURE: 64 MMHG | SYSTOLIC BLOOD PRESSURE: 130 MMHG | HEART RATE: 81 BPM | BODY MASS INDEX: 21.25 KG/M2 | TEMPERATURE: 97.8 F

## 2020-07-15 DIAGNOSIS — D69.3 CHRONIC ITP (IDIOPATHIC THROMBOCYTOPENIC PURPURA) (HCC): ICD-10-CM

## 2020-07-15 LAB
BASOPHILS ABSOLUTE: 0.02 K/UL (ref 0.01–0.08)
BASOPHILS RELATIVE PERCENT: 0.4 % (ref 0.1–1.2)
EOSINOPHILS ABSOLUTE: 0.15 K/UL (ref 0.04–0.54)
EOSINOPHILS RELATIVE PERCENT: 2.8 % (ref 0.7–7)
HCT VFR BLD CALC: 32.4 % (ref 34.1–44.9)
HEMOGLOBIN: 10.5 G/DL (ref 11.2–15.7)
LYMPHOCYTES ABSOLUTE: 1.13 K/UL (ref 1.18–3.74)
LYMPHOCYTES RELATIVE PERCENT: 21 % (ref 19.3–53.1)
MCH RBC QN AUTO: 30.5 PG (ref 25.6–32.2)
MCHC RBC AUTO-ENTMCNC: 32.4 G/DL (ref 32.3–35.5)
MCV RBC AUTO: 94.2 FL (ref 79.4–94.8)
MONOCYTES ABSOLUTE: 0.48 K/UL (ref 0.24–0.82)
MONOCYTES RELATIVE PERCENT: 8.9 % (ref 4.7–12.5)
NEUTROPHILS ABSOLUTE: 3.6 K/UL (ref 1.56–6.13)
NEUTROPHILS RELATIVE PERCENT: 66.9 % (ref 34–71.1)
PDW BLD-RTO: 12.8 % (ref 11.7–14.4)
PLATELET # BLD: 238 K/UL (ref 182–369)
PMV BLD AUTO: 9.8 FL (ref 7.4–10.4)
RBC # BLD: 3.44 M/UL (ref 3.93–5.22)
WBC # BLD: 5.38 K/UL (ref 3.98–10.04)

## 2020-07-15 PROCEDURE — 85025 COMPLETE CBC W/AUTO DIFF WBC: CPT

## 2020-07-15 PROCEDURE — G8400 PT W/DXA NO RESULTS DOC: HCPCS | Performed by: NURSE PRACTITIONER

## 2020-07-15 PROCEDURE — G8420 CALC BMI NORM PARAMETERS: HCPCS | Performed by: NURSE PRACTITIONER

## 2020-07-15 PROCEDURE — 99211 OFF/OP EST MAY X REQ PHY/QHP: CPT

## 2020-07-15 PROCEDURE — 1123F ACP DISCUSS/DSCN MKR DOCD: CPT | Performed by: NURSE PRACTITIONER

## 2020-07-15 PROCEDURE — 4040F PNEUMOC VAC/ADMIN/RCVD: CPT | Performed by: NURSE PRACTITIONER

## 2020-07-15 PROCEDURE — 99214 OFFICE O/P EST MOD 30 MIN: CPT | Performed by: NURSE PRACTITIONER

## 2020-07-15 PROCEDURE — 3017F COLORECTAL CA SCREEN DOC REV: CPT | Performed by: NURSE PRACTITIONER

## 2020-07-15 PROCEDURE — 1090F PRES/ABSN URINE INCON ASSESS: CPT | Performed by: NURSE PRACTITIONER

## 2020-07-15 PROCEDURE — 1036F TOBACCO NON-USER: CPT | Performed by: NURSE PRACTITIONER

## 2020-07-15 PROCEDURE — G8427 DOCREV CUR MEDS BY ELIG CLIN: HCPCS | Performed by: NURSE PRACTITIONER

## 2020-07-15 RX ORDER — FERROUS SULFATE 325(65) MG
325 TABLET ORAL 2 TIMES DAILY
Qty: 60 TABLET | Refills: 5 | Status: SHIPPED | OUTPATIENT
Start: 2020-07-15 | End: 2021-11-04 | Stop reason: SDUPTHER

## 2020-07-15 ASSESSMENT — ENCOUNTER SYMPTOMS
BLOOD IN STOOL: 0
EYE PAIN: 0
EYE REDNESS: 0
GASTROINTESTINAL NEGATIVE: 1
ABDOMINAL PAIN: 0
BACK PAIN: 0
NAUSEA: 0
RESPIRATORY NEGATIVE: 1
WHEEZING: 0
SORE THROAT: 0
VOMITING: 0
COUGH: 0
SHORTNESS OF BREATH: 0
CONSTIPATION: 0
EYES NEGATIVE: 1
DIARRHEA: 0
EYE DISCHARGE: 0

## 2020-07-15 NOTE — PROGRESS NOTES
on 6/22/2017 after weekly dose titration of 10 mg weekly. Prednisone 15 mg daily ×14 days, completed on 11/18/2017 for planned colonoscopy for positive occult stools. 5/2/2018 -initiation of Promacta 50 mg by mouth daily. Dose increased to 75 mg daily on 10/3/2018.   6/21/2019- decreased Promacta dosing to 50 mg   11/25/2019-decrease Promacta to 25 mg daily  1/21/2020-decrease Promacta 12.5 mg daily  7/15/2020-ferrous sulfate 325 mg 1 tablet p.o. x7 days then increase to twice daily if tolerated    HEMATOLOGY HISTORY:  [place] was seen in initial hematology consultation on 4/27/2017 for new onset of thrombocytopenia and anemia in referral from Dr. Blair Canchola. [place] has a history of lupus and a history of acute blood loss secondary to a vaginal hysterectomy with anterior posterior vaginal repair, and cystoscopy on 12/14/2016. She is followed by Dr. Gaye Lara in [address] [place] [address] for her lupus and denies any significant flareups. She denies any previous history of thrombocytopenia or bleeding tendencies. Review of her present medication list did not reveal any medications that cause significant thrombocytopenia and she denies drinking any tonic water. CBC HISTORY:  12/19/2016 - WBC 8.36, hemoglobin 7.4, hematocrit 20.5, MCV 79.2 and a platelet count of 243,409.   12/20/2016 - WBC 4.67, neutrophils 80.9%, hemoglobin 10.2, hematocrit 29, MCV 82.6 and a platelet count of 179,454.   12/22/2016 - WBC 6.4, neutrophils 70.8%, hemoglobin 10, hematocrit 29.8, MCV 84.7, and a platelet count 400,429.   12/28/2016 - WBC 6.6, neutrophils 66.2%, hemoglobin 12.3, hematocrit 37.1, MCV 86, and a platelet count of 79,251.   4/17/2017 4/27/2017- WBC 5.17, neutrophils 59%, hemoglobin 10.3, hematocrit 33.3, MCV 92.5 and a platelet count of 63,178. Normocytic, normochromic anemia- with a history of acute blood loss (12/2016). Hemoglobin has stabilized.   Serum studies on 4/27/2017:  Iron 50   TIBC 276   Iron saturation 18%   Ferritin 466 (H)   Vitamin B12 426   Folate 17.9   Haptoglobin 116   Reticulocyte percent 1.3    Repeat serology studies on 9/21/2017:  Iron 66   TIBC 283   Iron saturation 23%   Ferritin 263   Vitamin B12 448   Folate 11.9   Referral potent 9.0   Reticulocyte count 1.2   IgG 625   IgA 204   IgM 204    10/6/2017 occult stool positive ×1. Colonoscopy on 11/17/2017 by Dr. Kandis Riley revealed diverticulosis in the sigmoid and descending colon. Localized mild inflammation was found in the descending colon secondary to ischemic colitis. Serology studies on 8/13/2018:  Iron 163   TIBC 343   Iron saturation 48%   Ferritin 269   Vitamin B12 319   Folate 6.0   Erythropoietin 8.3   Reticulocyte count 1%   Creatinine 1.06   GFR 53       Serology studies on 9/24/2019:  · Iron 94  · TIBC 362  · Iron saturation 26%  · Ferritin 52.5  · Folate 5.52  · Vitamin B12 270      Serology studies on 6/3/2020:  · iron of 80  · TIBC 419  · Iron saturation 19%  · Ferritin 33.2  · Reticulocyte count of 1.5%  · Creatinine 1.0/GFR 54      Thrombocytopenia- no known identifiable cause of onset, differential diagnosis of ITP. Review of medications did not reveal any known medications that induce thrombocytopenia. [facility] denied any bleeding tendencies or significant ecchymosis. Serum studies on 4/27/2017:     Anticardiolipin IgA and IgG <9, IgM 12   PTT 24, PT 9.7, and INR 0.9   Rheumatoid factor 20.8 (H)   MANUEL positive   CRP <0.3   ESR 4   hepatitis C antibody <0.1   HIV negative   M spike not observed     Ultrasound of the spleen on 4/28/2017 revealed no abnormalities and spleen measures 8.8 x 3.2 x 3.1 cm. Promacta 50 mg daily initiated on 5/2/2018. The goal is to achieve and maintain a platelet count equal to or greater than 50,000, to reduce the risk of bleeding. Promacta will be initiated at 50 mg daily. The Promacta, dose can be increased after 2 weeks of dosing with a max dosing of 75 mg daily.   Promacta increased to 75 hepatotoxicity. Past Medical History:    Past Medical History:   Diagnosis Date    Arrhythmia     CAD (coronary artery disease)     Hyperlipidemia     Hypertension     Immune thrombocytopenic purpura ([facility] 7/26/2019    Sleep apnea     Systemic lupus erythematosus ([facility] 7/26/2019    Thrombocytopenia, unspecified ([facility] 7/26/2019       Past Surgical History:    Past Surgical History:   Procedure Laterality Date    APPENDECTOMY      BREAST SURGERY      FOOT SURGERY      HYSTERECTOMY      TUBAL LIGATION         Current Medications:    Current Outpatient Medications   Medication Sig Dispense Refill    ferrous sulfate (IRON 325) 325 (65 Fe) MG tablet Take 1 tablet by mouth 2 times daily 60 tablet 5    eltrombopag (PROMACTA) 12.5 MG TABS tablet Take 1 tablet by mouth daily 30 tablet 11    metoprolol succinate (TOPROL XL) 50 MG extended release tablet Take 50 mg by mouth daily      lisinopril-hydrochlorothiazide (PRINZIDE;ZESTORETIC) 20-25 MG per tablet Take 1 tablet by mouth daily      hydroxychloroquine (PLAQUENIL) 200 MG tablet Take by mouth daily       No current facility-administered medications for this visit. Allergies: Allergies   Allergen Reactions    Ondansetron Hcl     Oxycodone-Aspirin Other (See Comments)       Social History:    Social History     Tobacco Use    Smoking status: Never Smoker    Smokeless tobacco: Never Used   Substance Use Topics    Alcohol use: Not Currently    Drug use: Never       Family History:   Family History   Problem Relation Age of Onset    Heart Disease Mother     Cancer Sister     Cancer Brother        Vitals:  Vitals:    07/15/20 1141   BP: 130/64   Pulse: 81   Temp: 97.8 °F (36.6 °C)   SpO2: 98%   Height: 5' (1.524 m)        Subjective   REVIEW OF SYSTEMS:   Review of Systems   Constitutional: Positive for fatigue. Negative for chills, diaphoresis and fever. HENT: Negative.   Negative for congestion, ear pain, hearing loss, nosebleeds, sore throat and tinnitus. Eyes: Negative. Negative for pain, discharge and redness. Respiratory: Negative. Negative for cough, shortness of breath and wheezing. Cardiovascular: Negative. Negative for chest pain, palpitations and leg swelling. Gastrointestinal: Negative. Negative for abdominal pain, blood in stool, constipation, diarrhea, nausea and vomiting. Endocrine: Negative for polydipsia. Genitourinary: Negative for dysuria, flank pain, frequency, hematuria and urgency. Musculoskeletal: Negative. Negative for back pain, myalgias and neck pain. Skin: Negative. Negative for rash. Neurological: Negative. Negative for dizziness, tremors, seizures, weakness and headaches. Hematological: Does not bruise/bleed easily. Psychiatric/Behavioral: Negative. The patient is not nervous/anxious. Objective   PHYSICAL EXAM:  Physical Exam  Vitals signs reviewed. Constitutional:       General: She is not in acute distress. Appearance: She is well-developed. She is not diaphoretic. HENT:      Head: Normocephalic and atraumatic. Mouth/Throat:      Pharynx: Uvula midline. Tonsils: No tonsillar exudate. Eyes:      General: Lids are normal. No scleral icterus. Right eye: No discharge. Left eye: No discharge. Conjunctiva/sclera: Conjunctivae normal.      Pupils: Pupils are equal, round, and reactive to light. Neck:      Musculoskeletal: Normal range of motion and neck supple. Thyroid: No thyroid mass or thyromegaly. Vascular: No JVD. Trachea: Trachea normal. No tracheal deviation. Cardiovascular:      Rate and Rhythm: Normal rate and regular rhythm. Heart sounds: Normal heart sounds. No murmur. No friction rub. No gallop. Pulmonary:      Effort: Pulmonary effort is normal. No respiratory distress. Breath sounds: Normal breath sounds. No wheezing or rales. Chest:      Chest wall: No tenderness.    Abdominal:      General: Bowel sounds are normal. There is no distension. Palpations: Abdomen is soft. There is no mass. Tenderness: There is no abdominal tenderness. There is no guarding or rebound. Hernia: No hernia is present. Musculoskeletal:         General: No tenderness or deformity. Comments: Range of motion within normal limits x4 extremities   Skin:     General: Skin is warm. Coloration: Skin is not pale. Findings: No erythema or rash. Neurological:      Mental Status: She is alert and oriented to person, place, and time. Cranial Nerves: No cranial nerve deficit. Coordination: Coordination normal.   Psychiatric:         Behavior: Behavior normal.         Thought Content: Thought content normal.         Labs:  CBC: WBC 5.38, ANC 3.6, hemoglobin 10.5, MCV 94.2 and a platelet count of 347,922    ASSESSMENT/PLAN:      1. Chronic ITP (idiopathic thrombocytopenic purpura), continues to have an excellent response to Promacta. Currently taking Promacta 12.5 mg daily and maintaining a platelet count > 708,364. We will continue with Promacta 12.5 mg daily with a platelet count of 553,568 today. - If platelet count drops below 150,000 will increase Promacta to 25 mg daily and if platelet count becomes > 400,000 can discontinue Promacta. - CBC Auto Differential; Future    2. Iron deficiency anemia secondary to inadequate dietary iron intake. Hemoglobin 10.5 with an MCV of 94.2. Iron studies on 6/30/2020 revealed iron of 80, TIBC 419, iron saturation 19%, ferritin 33.2 and reticulocyte count of 1.5%.  -Initiate ferrous sulfate 325 mg p.o. daily x7 and then increase to twice a day. Prescription provided    3. Chronic fatigue, grade 1. -Multifactorial secondary to comorbidities, age and treatment     FOLLOW UP:   1. Follow-up appointment given for 2 months, sooner if needed  2.  Continue to follow with other medical providers as recommended    Over 50% of the total visit time of 25 minutes in face to face encounter with the patient, out of which more than 50% of the time was spent in counseling patient  and coordination of care. Counseling included but was not limited to time spent reviewing labs, imaging studies/ treatment plan and answering questions. This does not include charting. Discussed precautions related to [address] and being at increased risk. Discussed proper handwashing to be done frequently, limit exposure to other individuals and maintain social distancing of 6 feet. Recommend contacting primary care provider if having respiratory symptoms for further recommendations and consideration for testing.     (Please note that portions of this note were completed with a voice recognition program. Efforts were made to edit the dictations but occasionally words are mis-transcribed.)      Electronically signed by BETTINA Craig on 7/17/2020 at 4:18 PM

## 2020-09-02 ENCOUNTER — OFFICE VISIT (OUTPATIENT)
Dept: HEMATOLOGY | Age: 75
End: 2020-09-02
Payer: MEDICARE

## 2020-09-02 ENCOUNTER — HOSPITAL ENCOUNTER (OUTPATIENT)
Dept: INFUSION THERAPY | Age: 75
Discharge: HOME | End: 2020-09-02
Payer: MEDICARE

## 2020-09-02 VITALS
DIASTOLIC BLOOD PRESSURE: 74 MMHG | WEIGHT: 111.5 LBS | HEART RATE: 70 BPM | TEMPERATURE: 97.8 F | HEIGHT: 60 IN | SYSTOLIC BLOOD PRESSURE: 126 MMHG | OXYGEN SATURATION: 98 % | BODY MASS INDEX: 21.89 KG/M2

## 2020-09-02 DIAGNOSIS — D50.8 IRON DEFICIENCY ANEMIA SECONDARY TO INADEQUATE DIETARY IRON INTAKE: ICD-10-CM

## 2020-09-02 DIAGNOSIS — D69.3 CHRONIC ITP (IDIOPATHIC THROMBOCYTOPENIC PURPURA) (HCC): ICD-10-CM

## 2020-09-02 LAB
ALBUMIN SERPL-MCNC: 4.5 G/DL (ref 3.5–5.2)
ALP BLD-CCNC: 60 U/L (ref 35–104)
ALT SERPL-CCNC: 13 U/L (ref 9–52)
ANION GAP SERPL CALCULATED.3IONS-SCNC: 12 MMOL/L (ref 7–19)
AST SERPL-CCNC: 26 U/L (ref 14–36)
BASOPHILS ABSOLUTE: 0.03 K/UL (ref 0.01–0.08)
BASOPHILS RELATIVE PERCENT: 0.5 % (ref 0.1–1.2)
BILIRUB SERPL-MCNC: 0.5 MG/DL (ref 0.2–1.3)
BUN BLDV-MCNC: 14 MG/DL (ref 7–17)
CALCIUM SERPL-MCNC: 9.4 MG/DL (ref 8.4–10.2)
CHLORIDE BLD-SCNC: 97 MMOL/L (ref 98–111)
CO2: 27 MMOL/L (ref 22–29)
CREAT SERPL-MCNC: 1 MG/DL (ref 0.5–1)
EOSINOPHILS ABSOLUTE: 0.2 K/UL (ref 0.04–0.54)
EOSINOPHILS RELATIVE PERCENT: 3.4 % (ref 0.7–7)
FERRITIN: 48.4 NG/ML (ref 11.1–264)
GFR NON-AFRICAN AMERICAN: 54
GLOBULIN: 2 G/DL
GLUCOSE BLD-MCNC: 98 MG/DL (ref 74–106)
HCT VFR BLD CALC: 32.6 % (ref 34.1–44.9)
HEMOGLOBIN: 10.4 G/DL (ref 11.2–15.7)
IRON SATURATION: 21 % (ref 14–50)
IRON: 82 UG/DL (ref 37–170)
LYMPHOCYTES ABSOLUTE: 1.16 K/UL (ref 1.18–3.74)
LYMPHOCYTES RELATIVE PERCENT: 20 % (ref 19.3–53.1)
MCH RBC QN AUTO: 30.3 PG (ref 25.6–32.2)
MCHC RBC AUTO-ENTMCNC: 31.9 G/DL (ref 32.3–35.5)
MCV RBC AUTO: 95 FL (ref 79.4–94.8)
MONOCYTES ABSOLUTE: 0.56 K/UL (ref 0.24–0.82)
MONOCYTES RELATIVE PERCENT: 9.7 % (ref 4.7–12.5)
NEUTROPHILS ABSOLUTE: 3.85 K/UL (ref 1.56–6.13)
NEUTROPHILS RELATIVE PERCENT: 66.4 % (ref 34–71.1)
PDW BLD-RTO: 12.6 % (ref 11.7–14.4)
PLATELET # BLD: 239 K/UL (ref 182–369)
PMV BLD AUTO: 9.7 FL (ref 7.4–10.4)
POTASSIUM SERPL-SCNC: 3.5 MMOL/L (ref 3.5–5.1)
RBC # BLD: 3.43 M/UL (ref 3.93–5.22)
SODIUM BLD-SCNC: 136 MMOL/L (ref 137–145)
TOTAL IRON BINDING CAPACITY: 400 UG/DL (ref 265–497)
TOTAL PROTEIN: 6.5 G/DL (ref 6.3–8.2)
WBC # BLD: 5.8 K/UL (ref 3.98–10.04)

## 2020-09-02 PROCEDURE — 4040F PNEUMOC VAC/ADMIN/RCVD: CPT | Performed by: NURSE PRACTITIONER

## 2020-09-02 PROCEDURE — G8400 PT W/DXA NO RESULTS DOC: HCPCS | Performed by: NURSE PRACTITIONER

## 2020-09-02 PROCEDURE — 85025 COMPLETE CBC W/AUTO DIFF WBC: CPT

## 2020-09-02 PROCEDURE — G8420 CALC BMI NORM PARAMETERS: HCPCS | Performed by: NURSE PRACTITIONER

## 2020-09-02 PROCEDURE — 1090F PRES/ABSN URINE INCON ASSESS: CPT | Performed by: NURSE PRACTITIONER

## 2020-09-02 PROCEDURE — 82728 ASSAY OF FERRITIN: CPT

## 2020-09-02 PROCEDURE — G8427 DOCREV CUR MEDS BY ELIG CLIN: HCPCS | Performed by: NURSE PRACTITIONER

## 2020-09-02 PROCEDURE — 99211 OFF/OP EST MAY X REQ PHY/QHP: CPT

## 2020-09-02 PROCEDURE — 83540 ASSAY OF IRON: CPT

## 2020-09-02 PROCEDURE — 3017F COLORECTAL CA SCREEN DOC REV: CPT | Performed by: NURSE PRACTITIONER

## 2020-09-02 PROCEDURE — 1036F TOBACCO NON-USER: CPT | Performed by: NURSE PRACTITIONER

## 2020-09-02 PROCEDURE — 83550 IRON BINDING TEST: CPT

## 2020-09-02 PROCEDURE — 80053 COMPREHEN METABOLIC PANEL: CPT

## 2020-09-02 PROCEDURE — 1123F ACP DISCUSS/DSCN MKR DOCD: CPT | Performed by: NURSE PRACTITIONER

## 2020-09-02 PROCEDURE — 99213 OFFICE O/P EST LOW 20 MIN: CPT | Performed by: NURSE PRACTITIONER

## 2020-09-02 ASSESSMENT — ENCOUNTER SYMPTOMS
COUGH: 0
EYES NEGATIVE: 1
VOMITING: 0
SORE THROAT: 0
ABDOMINAL PAIN: 0
DIARRHEA: 0
BACK PAIN: 0
WHEEZING: 0
BLOOD IN STOOL: 0
EYE PAIN: 0
RESPIRATORY NEGATIVE: 1
CONSTIPATION: 0
EYE DISCHARGE: 0
SHORTNESS OF BREATH: 0
EYE REDNESS: 0

## 2020-09-02 NOTE — PROGRESS NOTES
Progress Note      Pt Name: Matty Estrada  YOB: 1945  MRN: [id number]    Date of evaluation: 9/2/2020  History Obtained From:  patient, electronic medical record    CHIEF COMPLAINT:    Chief Complaint   Patient presents with    Follow-up     Chronic ITP (idiopathic thrombocytopenic purpura    Anemia     HISTORY OF PRESENT ILLNESS:    Matty Estrada is a 70 y.o.  female with significant PMH of suspected ITP and anemia. [place] was initiated on Promacta in May 2018 and has had an excellent response to treatment. Her Promacta dose has been able to be decreased to 12.5 mg daily, previously receiving max dose of 75 mg daily. In relation to her anemia she was initiated on ferrous sulfate 325 twice daily 1/15/2020 [place] returns today in scheduled follow-up for evaluation, side effect monitoring, lab monitoring and further recommendations. She presents today indicating that overall she is doing fairly well with the exception of chronic fatigue. She reports experiencing nausea with the ferrous sulfate twice a day and currently taking once a day without significant difficulty. CBC today revealed a hemoglobin of 10.4, MCV 95 and a platelet count of 476,439. Promacta: Information obtained from up-to-date  :    HEMATOLOGY HISTORY:   Diagnosis: Thrombocytopenia with differential diagnosis of ITP    Sjogrens  Iron deficiency anemia    Treatment summary:  Initiation of prednisone at 50 mg daily on 4/27/2017 and completed on 6/22/2017 after weekly dose titration of 10 mg weekly. Prednisone 15 mg daily ×14 days, completed on 11/18/2017 for planned colonoscopy for positive occult stools. 5/2/2018 -initiation of Promacta 50 mg by mouth daily. Dose increased to 75 mg daily on 10/3/2018.   6/21/2019- decreased Promacta dosing to 50 mg   11/25/2019-decrease Promacta to 25 mg daily  1/21/2020-decrease Promacta 12.5 mg daily  7/15/2020-ferrous sulfate 325 mg 1 tablet p.o. inflammation was found in the descending colon secondary to ischemic colitis. Serology studies on 8/13/2018:  Iron 163   TIBC 343   Iron saturation 48%   Ferritin 269   Vitamin B12 319   Folate 6.0   Erythropoietin 8.3   Reticulocyte count 1%   Creatinine 1.06   GFR 53       Serology studies on 9/24/2019:  · Iron 94  · TIBC 362  · Iron saturation 26%  · Ferritin 52.5  · Folate 5.52  · Vitamin B12 270      Serology studies on 6/3/2020:  · iron of 80  · TIBC 419  · Iron saturation 19%  · Ferritin 33.2  · Reticulocyte count of 1.5%  · Creatinine 1.0/GFR 54      Thrombocytopenia- no known identifiable cause of onset, differential diagnosis of ITP. Review of medications did not reveal any known medications that induce thrombocytopenia. [place] denied any bleeding tendencies or significant ecchymosis. Serum studies on 4/27/2017:     Anticardiolipin IgA and IgG <9, IgM 12   PTT 24, PT 9.7, and INR 0.9   Rheumatoid factor 20.8 (H)   MANUEL positive   CRP <0.3   ESR 4   hepatitis C antibody <0.1   HIV negative   M spike not observed     Ultrasound of the spleen on 4/28/2017 revealed no abnormalities and spleen measures 8.8 x 3.2 x 3.1 cm. Promacta 50 mg daily initiated on 5/2/2018. The goal is to achieve and maintain a platelet count equal to or greater than 50,000, to reduce the risk of bleeding. Promacta will be initiated at 50 mg daily. The Promacta, dose can be increased after 2 weeks of dosing with a max dosing of 75 mg daily. Promacta increased to 75 mg daily on 10/3/2018 for a platelet count of 49,378. Promacta decreased to 50 mg daily on 6/20/2019 for platelet count being > 200,000, platelets 843,762. History of lupus: [place] is followed by Dr. Nael Rincon in [place]. She denies any significant joint pain, fatigue, febrile illness, or skin lesions, these symptoms are common with lupus. She reports a hiostory of hair loss and this symptom can be associated with lupus.  She denies any recent flareups or lab testing related to her lupus diagnosis. [place] was evaluated by Dr. Henny Case on 5/26/2017 and he is suggesting that [place] has a differential diagnosis of Sjogern's. Serum studies on 4/27/2017  ds- DNA antibody 1   Aguilar antibodies <0.2   Sjogren's antibodies (SSA) > 8.0 (H)   Sjogren's antibodies (SSB) 5.8 (H)   Antichromatin antibodies <0.2   C4 20   Centromere B antibody <0.2    Bone marrow aspiration biopsy on 4/17/2018 documented low normal cellular bone marrow for age (~20-30%) with trilineage hematopoiesis and a blast count of ~1%. Many small lymphoid aggregates with mixed CD3+ T and CD20+ B-cells seen on clot section (likely representing underlying autoimmune disease). No morphological or cytogenic (FISH) evidence of a myelodysplastic syndrome. No diagnostic evidence of lymphoroliferative disorder, granulomas or metastatic malignancy. Normal female karyotype. Thrombocytopenia along with adequate number of megakaryocytes is consistent with peripheral destruction/sequestration of platelets. Dr. Laurie Castro has recommended initiation of Promacta. The goal is to achieve and maintain a platelet count equal to or greater than 50,000, to reduce the risk of bleeding. Promacta will be initiated at 50 mg daily. The Promacta, dose can be increased after 2 weeks of dosing with a max dosing of 75 mg daily. [place] is knowledgeable of the possible side effects to include but not limited to fatigue, headache, insomnia, pruritus, anemia, and hepatotoxicity.     Past Medical History:    Past Medical History:   Diagnosis Date    Arrhythmia     CAD (coronary artery disease)     Hyperlipidemia     Hypertension     Immune thrombocytopenic purpura ([facility] 7/26/2019    Sleep apnea     Systemic lupus erythematosus ([facility] 7/26/2019    Thrombocytopenia, unspecified ([facility] 7/26/2019       Past Surgical History:    Past Surgical History:   Procedure Laterality Date    APPENDECTOMY      BREAST SURGERY      FOOT SURGERY      HYSTERECTOMY      TUBAL LIGATION         Current Medications:    Current Outpatient Medications   Medication Sig Dispense Refill    ferrous sulfate (IRON 325) 325 (65 Fe) MG tablet Take 1 tablet by mouth 2 times daily 60 tablet 5    eltrombopag (PROMACTA) 12.5 MG TABS tablet Take 1 tablet by mouth daily 30 tablet 11    metoprolol succinate (TOPROL XL) 50 MG extended release tablet Take 50 mg by mouth daily      lisinopril-hydrochlorothiazide (PRINZIDE;ZESTORETIC) 20-25 MG per tablet Take 1 tablet by mouth daily      hydroxychloroquine (PLAQUENIL) 200 MG tablet Take by mouth daily       No current facility-administered medications for this visit. Allergies: Allergies   Allergen Reactions    Ondansetron Hcl     Oxycodone-Aspirin Other (See Comments)       Social History:    Social History     Tobacco Use    Smoking status: Never Smoker    Smokeless tobacco: Never Used   Substance Use Topics    Alcohol use: Not Currently    Drug use: Never       Family History:   Family History   Problem Relation Age of Onset    Heart Disease Mother     Cancer Sister     Cancer Brother        Vitals:  Vitals:    09/02/20 1007   BP: 126/74   Pulse: 70   Temp: 97.8 °F (36.6 °C)   SpO2: 98%   Weight: 111 lb 8 oz (50.6 kg)   Height: 5' (1.524 m)        Subjective   REVIEW OF SYSTEMS:   Review of Systems   Constitutional: Positive for fatigue. Negative for chills, diaphoresis and fever. HENT: Negative. Negative for congestion, ear pain, hearing loss, nosebleeds, sore throat and tinnitus. Eyes: Negative. Negative for pain, discharge and redness. Respiratory: Negative. Negative for cough, shortness of breath and wheezing. Cardiovascular: Negative. Negative for chest pain, palpitations and leg swelling. Gastrointestinal: Positive for nausea (With oral iron). Negative for abdominal pain, blood in stool, constipation, diarrhea and vomiting. Endocrine: Negative for polydipsia. Genitourinary: Negative for dysuria, flank pain, frequency, hematuria and urgency. Musculoskeletal: Negative. Negative for back pain, myalgias and neck pain. Skin: Negative. Negative for rash. Neurological: Negative. Negative for dizziness, tremors, seizures, weakness and headaches. Hematological: Does not bruise/bleed easily. Psychiatric/Behavioral: Negative. The patient is not nervous/anxious. Objective   PHYSICAL EXAM:  Physical Exam  Vitals signs reviewed. Constitutional:       General: She is not in acute distress. Appearance: She is well-developed. She is not diaphoretic. HENT:      Head: Normocephalic and atraumatic. Mouth/Throat:      Pharynx: Uvula midline. Tonsils: No tonsillar exudate. Eyes:      General: Lids are normal. No scleral icterus. Right eye: No discharge. Left eye: No discharge. Conjunctiva/sclera: Conjunctivae normal.      Pupils: Pupils are equal, round, and reactive to light. Neck:      Musculoskeletal: Normal range of motion and neck supple. Thyroid: No thyroid mass or thyromegaly. Vascular: No JVD. Trachea: Trachea normal. No tracheal deviation. Cardiovascular:      Rate and Rhythm: Normal rate and regular rhythm. Heart sounds: Normal heart sounds. No murmur. No friction rub. No gallop. Pulmonary:      Effort: Pulmonary effort is normal. No respiratory distress. Breath sounds: Normal breath sounds. No wheezing or rales. Chest:      Chest wall: No tenderness. Abdominal:      General: Bowel sounds are normal. There is no distension. Palpations: Abdomen is soft. There is no mass. Tenderness: There is no abdominal tenderness. There is no guarding or rebound. Hernia: No hernia is present. Musculoskeletal:         General: No tenderness or deformity. Comments: Range of motion within normal limits x4 extremities   Skin:     General: Skin is warm.       Coloration: Skin is not pale.      Findings: No erythema or rash. Neurological:      Mental Status: She is alert and oriented to person, place, and time. Cranial Nerves: No cranial nerve deficit. Coordination: Coordination normal.   Psychiatric:         Behavior: Behavior normal.         Thought Content: Thought content normal.         Labs:  CBC: WBC 5.8, ANC 3.85, hemoglobin 10.4, MCV 95 and a platelet count of 365,850    ASSESSMENT/PLAN:      1. Chronic ITP (idiopathic thrombocytopenic purpura), have an excellent response to Promacta. Has been taking Promacta 12.5 mg daily since January 2020 and maintaining a platelet count >018,189. Platelet count of 007,135 today.      -Continue Promacta 12.5 mg daily  -CBC today and in 4 weeks    If platelet count drops below 150,000 can increase Promacta to 25 mg daily and if platelet count becomes > 400,000 can discontinue Promacta. 2. Iron deficiency anemia secondary to inadequate dietary iron intake. Hemoglobin stable at 10.4 with an MCV of 95. Experienced GI upset/nausea with taking ferrous sulfate 325 mg twice a day, currently tolerating once a day dosing. Will reevaluate iron substrates today.   -Continue ferrous sulfate 325 mg daily  -Iron panel, ferritin and CMP today      3. Chronic fatigue, grade 1, stable and multifactoral secondary to age, comorbidities and current treatment      FOLLOW UP:   1. Follow-up appointment given for 2 months, sooner if needed  2. Continue to follow with other medical providers as recommended    Over 50% of the total visit time of 15 minutes in face to face encounter with the patient, out of which more than 50% of the time was spent in counseling patient  and coordination of care. Counseling included but was not limited to time spent reviewing labs, imaging studies/ treatment plan and answering questions. This does not include charting. Discussed precautions related to [address] and being at increased risk.   Discussed proper handwashing to be done frequently, limit exposure to other individuals and maintain social distancing of 6 feet. Recommend contacting primary care provider if having respiratory symptoms for further recommendations and consideration for testing.     (Please note that portions of this note were completed with a voice recognition program. Efforts were made to edit the dictations but occasionally words are mis-transcribed.)      Electronically signed by BETTINA Bundy on 9/3/2020 at 3:46 PM

## 2020-09-03 ASSESSMENT — ENCOUNTER SYMPTOMS: NAUSEA: 1

## 2020-11-04 ENCOUNTER — HOSPITAL ENCOUNTER (OUTPATIENT)
Dept: INFUSION THERAPY | Age: 75
Discharge: HOME | End: 2020-11-04
Payer: MEDICARE

## 2020-11-04 ENCOUNTER — OFFICE VISIT (OUTPATIENT)
Dept: HEMATOLOGY | Age: 75
End: 2020-11-04
Payer: MEDICARE

## 2020-11-04 VITALS
HEIGHT: 60 IN | SYSTOLIC BLOOD PRESSURE: 120 MMHG | TEMPERATURE: 97.3 F | OXYGEN SATURATION: 97 % | WEIGHT: 109 LBS | DIASTOLIC BLOOD PRESSURE: 60 MMHG | HEART RATE: 63 BPM | BODY MASS INDEX: 21.4 KG/M2

## 2020-11-04 DIAGNOSIS — D69.3 CHRONIC ITP (IDIOPATHIC THROMBOCYTOPENIC PURPURA) (HCC): ICD-10-CM

## 2020-11-04 LAB
BASOPHILS ABSOLUTE: 0.04 K/UL (ref 0.01–0.08)
BASOPHILS RELATIVE PERCENT: 0.8 % (ref 0.1–1.2)
EOSINOPHILS ABSOLUTE: 0.17 K/UL (ref 0.04–0.54)
EOSINOPHILS RELATIVE PERCENT: 3.2 % (ref 0.7–7)
HCT VFR BLD CALC: 33.8 % (ref 34.1–44.9)
HEMOGLOBIN: 10.9 G/DL (ref 11.2–15.7)
LYMPHOCYTES ABSOLUTE: 1.19 K/UL (ref 1.18–3.74)
LYMPHOCYTES RELATIVE PERCENT: 22.3 % (ref 19.3–53.1)
MCH RBC QN AUTO: 30.7 PG (ref 25.6–32.2)
MCHC RBC AUTO-ENTMCNC: 32.2 G/DL (ref 32.3–35.5)
MCV RBC AUTO: 95.2 FL (ref 79.4–94.8)
MONOCYTES ABSOLUTE: 0.53 K/UL (ref 0.24–0.82)
MONOCYTES RELATIVE PERCENT: 9.9 % (ref 4.7–12.5)
NEUTROPHILS ABSOLUTE: 3.4 K/UL (ref 1.56–6.13)
NEUTROPHILS RELATIVE PERCENT: 63.8 % (ref 34–71.1)
PDW BLD-RTO: 12.5 % (ref 11.7–14.4)
PLATELET # BLD: 288 K/UL (ref 182–369)
PMV BLD AUTO: 9.5 FL (ref 7.4–10.4)
RBC # BLD: 3.55 M/UL (ref 3.93–5.22)
WBC # BLD: 5.33 K/UL (ref 3.98–10.04)

## 2020-11-04 PROCEDURE — 1090F PRES/ABSN URINE INCON ASSESS: CPT | Performed by: NURSE PRACTITIONER

## 2020-11-04 PROCEDURE — 1036F TOBACCO NON-USER: CPT | Performed by: NURSE PRACTITIONER

## 2020-11-04 PROCEDURE — 4040F PNEUMOC VAC/ADMIN/RCVD: CPT | Performed by: NURSE PRACTITIONER

## 2020-11-04 PROCEDURE — 99212 OFFICE O/P EST SF 10 MIN: CPT | Performed by: NURSE PRACTITIONER

## 2020-11-04 PROCEDURE — G8420 CALC BMI NORM PARAMETERS: HCPCS | Performed by: NURSE PRACTITIONER

## 2020-11-04 PROCEDURE — G8484 FLU IMMUNIZE NO ADMIN: HCPCS | Performed by: NURSE PRACTITIONER

## 2020-11-04 PROCEDURE — 1123F ACP DISCUSS/DSCN MKR DOCD: CPT | Performed by: NURSE PRACTITIONER

## 2020-11-04 PROCEDURE — 85025 COMPLETE CBC W/AUTO DIFF WBC: CPT

## 2020-11-04 PROCEDURE — G8427 DOCREV CUR MEDS BY ELIG CLIN: HCPCS | Performed by: NURSE PRACTITIONER

## 2020-11-04 PROCEDURE — G8400 PT W/DXA NO RESULTS DOC: HCPCS | Performed by: NURSE PRACTITIONER

## 2020-11-04 PROCEDURE — 99211 OFF/OP EST MAY X REQ PHY/QHP: CPT

## 2020-11-04 PROCEDURE — 3017F COLORECTAL CA SCREEN DOC REV: CPT | Performed by: NURSE PRACTITIONER

## 2020-11-04 ASSESSMENT — ENCOUNTER SYMPTOMS
EYE PAIN: 0
COUGH: 0
WHEEZING: 0
BACK PAIN: 0
EYE DISCHARGE: 0
CONSTIPATION: 0
RESPIRATORY NEGATIVE: 1
VOMITING: 0
ABDOMINAL PAIN: 0
EYES NEGATIVE: 1
BLOOD IN STOOL: 0
SHORTNESS OF BREATH: 0
DIARRHEA: 0
EYE REDNESS: 0
SORE THROAT: 0

## 2020-11-04 NOTE — PROGRESS NOTES
Progress Note      Pt Name: Julia Suresh  YOB: 1945  MRN: [id number]    Date of evaluation: 11/4/2020  History Obtained From:  patient, electronic medical record    CHIEF COMPLAINT:    Chief Complaint   Patient presents with    Follow-up     Chronic ITP (idiopathic thrombocytopenic purpura) ([facility] [address]      HISTORY OF PRESENT ILLNESS:    Julia Suresh is a 70 y.o.  female with significant PMH of suspected ITP and anemia. [place] was initiated on Promacta in May 2018 and has had an excellent response to treatment. Her Promacta dose has been decreased to 12.5 mg daily, previously receiving max dose of 75 mg daily. In relation to her anemia she takes ferrous sulfate 325 twice daily. [place] returns today in scheduled follow-up for evaluation, side effect monitoring, lab monitoring and further recommendations. She presents today with no new complaints and indicates that overall she is doing well other than her chronic fatigue, no change from previous evaluation. She reports she continues to tolerate the Promacta and ferrous sulfate without difficulty. CBC today is stable with a hemoglobin of 10.9, MCV 95.2 and a platelet count of 257,248. Iron substrates were last evaluated on 9/2/2020 that documented a iron of 82, iron saturation 21%, TIBC 400 and a ferritin of 48.4, recommendation will be to continue with the oral iron replacement. Promacta: Information obtained from up-to-date:    HEMATOLOGY HISTORY:   Diagnosis: Thrombocytopenia with differential diagnosis of ITP    Sjogrens  Iron deficiency anemia    Treatment summary:  Initiation of prednisone at 50 mg daily on 4/27/2017 and completed on 6/22/2017 after weekly dose titration of 10 mg weekly. Prednisone 15 mg daily ×14 days, completed on 11/18/2017 for planned colonoscopy for positive occult stools. 5/2/2018 -initiation of Promacta 50 mg by mouth daily. Dose increased to 75 mg daily on 10/3/2018. 6/21/2019- decreased Promacta dosing to 50 mg   11/25/2019-decrease Promacta to 25 mg daily  1/21/2020-decrease Promacta 12.5 mg daily  7/15/2020-ferrous sulfate 325 mg 1 tablet p.o. x7 days then increase to twice daily if tolerated    HEMATOLOGY HISTORY:  Pierre Soliz was seen in initial hematology consultation on 4/27/2017 for new onset of thrombocytopenia and anemia in referral from Dr. Tisha Badillo. Pierre Soliz has a history of lupus and a history of acute blood loss secondary to a vaginal hysterectomy with anterior posterior vaginal repair, and cystoscopy on 12/14/2016. She is followed by Dr. Alka Sarkar in [place] for her lupus and denies any significant flareups. She denies any previous history of thrombocytopenia or bleeding tendencies. Review of her present medication list did not reveal any medications that cause significant thrombocytopenia and she denies drinking any tonic water. CBC HISTORY:  12/19/2016 - WBC 8.36, hemoglobin 7.4, hematocrit 20.5, MCV 79.2 and a platelet count of 355,352.   12/20/2016 - WBC 4.67, neutrophils 80.9%, hemoglobin 10.2, hematocrit 29, MCV 82.6 and a platelet count of 128,578.   12/22/2016 - WBC 6.4, neutrophils 70.8%, hemoglobin 10, hematocrit 29.8, MCV 84.7, and a platelet count 655,278.   12/28/2016 - WBC 6.6, neutrophils 66.2%, hemoglobin 12.3, hematocrit 37.1, MCV 86, and a platelet count of 96,127.   4/17/2017 4/27/2017- WBC 5.17, neutrophils 59%, hemoglobin 10.3, hematocrit 33.3, MCV 92.5 and a platelet count of 51,191. Normocytic, normochromic anemia- with a history of acute blood loss (12/2016). Hemoglobin has stabilized.   Serum studies on 4/27/2017:  Iron 50   TIBC 276   Iron saturation 18%   Ferritin 466 (H)   Vitamin B12 426   Folate 17.9   Haptoglobin 116   Reticulocyte percent 1.3    Repeat serology studies on 9/21/2017:  Iron 66   TIBC 283   Iron saturation 23%   Ferritin 263   Vitamin B12 448   Folate 11.9   Referral potent 9.0   Reticulocyte count 1.2   IgG 625 IgA 204   IgM 204    10/6/2017 occult stool positive ×1. Colonoscopy on 11/17/2017 by Dr. Sharon Callahan revealed diverticulosis in the sigmoid and descending colon. Localized mild inflammation was found in the descending colon secondary to ischemic colitis. Serology studies on 8/13/2018:  Iron 163   TIBC 343   Iron saturation 48%   Ferritin 269   Vitamin B12 319   Folate 6.0   Erythropoietin 8.3   Reticulocyte count 1%   Creatinine 1.06   GFR 53       Serology studies on 9/24/2019:  · Iron 94  · TIBC 362  · Iron saturation 26%  · Ferritin 52.5  · Folate 5.52  · Vitamin B12 270      Serology studies on 6/3/2020:  · iron of 80  · TIBC 419  · Iron saturation 19%  · Ferritin 33.2  · Reticulocyte count of 1.5%  · Creatinine 1.0/GFR 54    Serology studies on 9/2/2020:  · Ferritin 48.4  · Iron 82  · Iron saturation 21%  · TIBC 400  · Creatinine 1.0/GFR 54    Thrombocytopenia- no known identifiable cause of onset, differential diagnosis of ITP. Review of medications did not reveal any known medications that induce thrombocytopenia. [place] denied any bleeding tendencies or significant ecchymosis. Serum studies on 4/27/2017:     Anticardiolipin IgA and IgG <9, IgM 12   PTT 24, PT 9.7, and INR 0.9   Rheumatoid factor 20.8 (H)   MANUEL positive   CRP <0.3   ESR 4   hepatitis C antibody <0.1   HIV negative   M spike not observed     Ultrasound of the spleen on 4/28/2017 revealed no abnormalities and spleen measures 8.8 x 3.2 x 3.1 cm. Promacta 50 mg daily initiated on 5/2/2018. The goal is to achieve and maintain a platelet count equal to or greater than 50,000, to reduce the risk of bleeding. Promacta will be initiated at 50 mg daily. The Promacta, dose can be increased after 2 weeks of dosing with a max dosing of 75 mg daily. Promacta increased to 75 mg daily on 10/3/2018 for a platelet count of 02,508.   Promacta decreased to 50 mg daily on 6/20/2019 for platelet count being > 200,000, platelets 214,000. History of lupus: [place] is followed by Dr. Verena Malloy in [place]. She denies any significant joint pain, fatigue, febrile illness, or skin lesions, these symptoms are common with lupus. She reports a hiostory of hair loss and this symptom can be associated with lupus. She denies any recent flareups or lab testing related to her lupus diagnosis. [place] was evaluated by Dr. Floresita Atkinson on 5/26/2017 and he is suggesting that [place] has a differential diagnosis of Sjogern's. Serum studies on 4/27/2017  ds- DNA antibody 1   Aguilar antibodies <0.2   Sjogren's antibodies (SSA) > 8.0 (H)   Sjogren's antibodies (SSB) 5.8 (H)   Antichromatin antibodies <0.2   C4 20   Centromere B antibody <0.2    Bone marrow aspiration biopsy on 4/17/2018 documented low normal cellular bone marrow for age (~20-30%) with trilineage hematopoiesis and a blast count of ~1%. Many small lymphoid aggregates with mixed CD3+ T and CD20+ B-cells seen on clot section (likely representing underlying autoimmune disease). No morphological or cytogenic (FISH) evidence of a myelodysplastic syndrome. No diagnostic evidence of lymphoroliferative disorder, granulomas or metastatic malignancy. Normal female karyotype. Thrombocytopenia along with adequate number of megakaryocytes is consistent with peripheral destruction/sequestration of platelets. Dr. Fabio Hernandez has recommended initiation of Promacta. The goal is to achieve and maintain a platelet count equal to or greater than 50,000, to reduce the risk of bleeding. Promacta will be initiated at 50 mg daily. The Promacta, dose can be increased after 2 weeks of dosing with a max dosing of 75 mg daily. [place] is knowledgeable of the possible side effects to include but not limited to fatigue, headache, insomnia, pruritus, anemia, and hepatotoxicity.     Past Medical History:    Past Medical History:   Diagnosis Date    Arrhythmia     CAD (coronary artery disease)     Hyperlipidemia  Hypertension     Immune thrombocytopenic purpura ([facility] 7/26/2019    Sleep apnea     Systemic lupus erythematosus ([facility] 7/26/2019    Thrombocytopenia, unspecified ([facility] 7/26/2019       Past Surgical History:    Past Surgical History:   Procedure Laterality Date    APPENDECTOMY      BREAST SURGERY      FOOT SURGERY      HYSTERECTOMY      TUBAL LIGATION         Current Medications:    Current Outpatient Medications   Medication Sig Dispense Refill    ferrous sulfate (IRON 325) 325 (65 Fe) MG tablet Take 1 tablet by mouth 2 times daily 60 tablet 5    eltrombopag (PROMACTA) 12.5 MG TABS tablet Take 1 tablet by mouth daily 30 tablet 11    metoprolol succinate (TOPROL XL) 50 MG extended release tablet Take 50 mg by mouth daily      lisinopril-hydrochlorothiazide (PRINZIDE;ZESTORETIC) 20-25 MG per tablet Take 1 tablet by mouth daily      hydroxychloroquine (PLAQUENIL) 200 MG tablet Take by mouth daily       No current facility-administered medications for this visit. Allergies: Allergies   Allergen Reactions    Ondansetron Hcl     Oxycodone-Aspirin Other (See Comments)       Social History:    Social History     Tobacco Use    Smoking status: Never Smoker    Smokeless tobacco: Never Used   Substance Use Topics    Alcohol use: Not Currently    Drug use: Never       Family History:   Family History   Problem Relation Age of Onset    Heart Disease Mother     Cancer Sister     Cancer Brother        Vitals:  Vitals:    11/04/20 0944   BP: 120/60   Pulse: 63   Temp: 97.3 °F (36.3 °C)   SpO2: 97%   Weight: 109 lb (49.4 kg)   Height: 5' (1.524 m)        Subjective   REVIEW OF SYSTEMS:   Review of Systems   Constitutional: Positive for fatigue. Negative for chills, diaphoresis and fever. HENT: Negative. Negative for congestion, ear pain, hearing loss, nosebleeds, sore throat and tinnitus. Eyes: Negative. Negative for pain, discharge and redness. Respiratory: Negative.   Negative for cough, shortness of breath and wheezing. Cardiovascular: Negative. Negative for chest pain, palpitations and leg swelling. Gastrointestinal: Negative. Negative for abdominal pain, blood in stool, constipation, diarrhea, nausea and vomiting. Endocrine: Negative for polydipsia. Genitourinary: Negative for dysuria, flank pain, frequency, hematuria and urgency. Musculoskeletal: Negative. Negative for back pain, myalgias and neck pain. Skin: Negative. Negative for rash. Neurological: Negative. Negative for dizziness, tremors, seizures, weakness and headaches. Hematological: Does not bruise/bleed easily. Psychiatric/Behavioral: Negative. The patient is not nervous/anxious. Objective   PHYSICAL EXAM:  Physical Exam  Vitals signs reviewed. Constitutional:       General: She is not in acute distress. Appearance: She is well-developed. She is not diaphoretic. HENT:      Head: Normocephalic and atraumatic. Mouth/Throat:      Pharynx: Uvula midline. Tonsils: No tonsillar exudate. Eyes:      General: Lids are normal. No scleral icterus. Right eye: No discharge. Left eye: No discharge. Conjunctiva/sclera: Conjunctivae normal.      Pupils: Pupils are equal, round, and reactive to light. Neck:      Musculoskeletal: Normal range of motion and neck supple. Thyroid: No thyroid mass or thyromegaly. Vascular: No JVD. Trachea: Trachea normal. No tracheal deviation. Cardiovascular:      Rate and Rhythm: Normal rate and regular rhythm. Heart sounds: Normal heart sounds. No murmur. No friction rub. No gallop. Pulmonary:      Effort: Pulmonary effort is normal. No respiratory distress. Breath sounds: Normal breath sounds. No wheezing or rales. Chest:      Chest wall: No tenderness. Abdominal:      General: Bowel sounds are normal. There is no distension. Palpations: Abdomen is soft. There is no mass. Tenderness:  There is no abdominal tenderness. There is no guarding or rebound. Hernia: No hernia is present. Musculoskeletal:         General: No tenderness or deformity. Comments: Range of motion within normal limits x4 extremities   Skin:     General: Skin is warm. Coloration: Skin is not pale. Findings: No erythema or rash. Neurological:      Mental Status: She is alert and oriented to person, place, and time. Cranial Nerves: No cranial nerve deficit. Coordination: Coordination normal.   Psychiatric:         Behavior: Behavior normal.         Thought Content: Thought content normal.         Labs:  CBC: WBC 5.33, ANC 3.4, hemoglobin of 10.9, MCV 95.2 and a platelet count of 518,962. ASSESSMENT/PLAN:      1. Chronic ITP (idiopathic thrombocytopenic purpura), continues to have an excellent response with Promacta. Continues to take Promacta 12.5 mg daily since January 2020 and has been maintaining a platelet count  > 758,618 . If platelet count were to become greater than 400,000 would consider discontinuing Promacta at that time. Platelet count today of 288,000.     -Continue Promacta 12.5 mg daily  -CBC and CMP today    If platelet count drops below 150,000 can increase Promacta to 25 mg daily and if platelet count becomes > 400,000 can discontinue Promacta. 2. Iron deficiency anemia secondary to inadequate dietary iron intake. Hemoglobin 10.9 and MCV 95.2 is stable. Continues to tolerate oral ferrous sulfate twice a day without significant difficulty. Iron substrates were last evaluated on 9/2/2020 that documented a iron of 82, iron saturation 21%, TIBC 400 and a ferritin of 48.4. Eleanor Graven -Continue ferrous sulfate 325 mg p.o. twice daily      3. Chronic fatigue, grade 1, stable with no change from previous evaluation. FOLLOW UP:   1. Follow-up appointment given for 2 months, sooner if needed  2.  Continue to follow with other medical providers as recommended    Over 50% of the total visit

## 2020-11-09 ASSESSMENT — ENCOUNTER SYMPTOMS
NAUSEA: 0
GASTROINTESTINAL NEGATIVE: 1

## 2021-01-06 ENCOUNTER — HOSPITAL ENCOUNTER (OUTPATIENT)
Dept: INFUSION THERAPY | Age: 76
Discharge: HOME | End: 2021-01-06
Payer: MEDICARE

## 2021-01-06 ENCOUNTER — OFFICE VISIT (OUTPATIENT)
Dept: HEMATOLOGY | Age: 76
End: 2021-01-06
Payer: MEDICARE

## 2021-01-06 VITALS
WEIGHT: 112.4 LBS | HEART RATE: 80 BPM | OXYGEN SATURATION: 98 % | DIASTOLIC BLOOD PRESSURE: 84 MMHG | BODY MASS INDEX: 22.07 KG/M2 | HEIGHT: 60 IN | SYSTOLIC BLOOD PRESSURE: 130 MMHG | TEMPERATURE: 96.9 F

## 2021-01-06 DIAGNOSIS — D50.8 IRON DEFICIENCY ANEMIA SECONDARY TO INADEQUATE DIETARY IRON INTAKE: ICD-10-CM

## 2021-01-06 DIAGNOSIS — R11.0 NAUSEA: ICD-10-CM

## 2021-01-06 DIAGNOSIS — R53.82 CHRONIC FATIGUE: ICD-10-CM

## 2021-01-06 DIAGNOSIS — D69.3 CHRONIC ITP (IDIOPATHIC THROMBOCYTOPENIC PURPURA) (HCC): Primary | ICD-10-CM

## 2021-01-06 DIAGNOSIS — D69.3 CHRONIC ITP (IDIOPATHIC THROMBOCYTOPENIC PURPURA) (HCC): ICD-10-CM

## 2021-01-06 LAB
BASOPHILS ABSOLUTE: 0.05 K/UL (ref 0.01–0.08)
BASOPHILS RELATIVE PERCENT: 0.9 % (ref 0.1–1.2)
EOSINOPHILS ABSOLUTE: 0.16 K/UL (ref 0.04–0.54)
EOSINOPHILS RELATIVE PERCENT: 2.9 % (ref 0.7–7)
HCT VFR BLD CALC: 31.7 % (ref 34.1–44.9)
HEMOGLOBIN: 10.5 G/DL (ref 11.2–15.7)
LYMPHOCYTES ABSOLUTE: 1.18 K/UL (ref 1.18–3.74)
LYMPHOCYTES RELATIVE PERCENT: 21.1 % (ref 19.3–53.1)
MCH RBC QN AUTO: 31.1 PG (ref 25.6–32.2)
MCHC RBC AUTO-ENTMCNC: 33.1 G/DL (ref 32.3–35.5)
MCV RBC AUTO: 93.8 FL (ref 79.4–94.8)
MONOCYTES ABSOLUTE: 0.54 K/UL (ref 0.24–0.82)
MONOCYTES RELATIVE PERCENT: 9.7 % (ref 4.7–12.5)
NEUTROPHILS ABSOLUTE: 3.65 K/UL (ref 1.56–6.13)
NEUTROPHILS RELATIVE PERCENT: 65.4 % (ref 34–71.1)
PDW BLD-RTO: 12.6 % (ref 11.7–14.4)
PLATELET # BLD: 211 K/UL (ref 182–369)
PMV BLD AUTO: 9.7 FL (ref 7.4–10.4)
RBC # BLD: 3.38 M/UL (ref 3.93–5.22)
WBC # BLD: 5.58 K/UL (ref 3.98–10.04)

## 2021-01-06 PROCEDURE — G8484 FLU IMMUNIZE NO ADMIN: HCPCS | Performed by: NURSE PRACTITIONER

## 2021-01-06 PROCEDURE — 99213 OFFICE O/P EST LOW 20 MIN: CPT | Performed by: NURSE PRACTITIONER

## 2021-01-06 PROCEDURE — G8427 DOCREV CUR MEDS BY ELIG CLIN: HCPCS | Performed by: NURSE PRACTITIONER

## 2021-01-06 PROCEDURE — 3017F COLORECTAL CA SCREEN DOC REV: CPT | Performed by: NURSE PRACTITIONER

## 2021-01-06 PROCEDURE — 4040F PNEUMOC VAC/ADMIN/RCVD: CPT | Performed by: NURSE PRACTITIONER

## 2021-01-06 PROCEDURE — 85025 COMPLETE CBC W/AUTO DIFF WBC: CPT

## 2021-01-06 PROCEDURE — G8420 CALC BMI NORM PARAMETERS: HCPCS | Performed by: NURSE PRACTITIONER

## 2021-01-06 PROCEDURE — 99211 OFF/OP EST MAY X REQ PHY/QHP: CPT

## 2021-01-06 PROCEDURE — G8400 PT W/DXA NO RESULTS DOC: HCPCS | Performed by: NURSE PRACTITIONER

## 2021-01-06 PROCEDURE — 1123F ACP DISCUSS/DSCN MKR DOCD: CPT | Performed by: NURSE PRACTITIONER

## 2021-01-06 PROCEDURE — 1090F PRES/ABSN URINE INCON ASSESS: CPT | Performed by: NURSE PRACTITIONER

## 2021-01-06 PROCEDURE — 1036F TOBACCO NON-USER: CPT | Performed by: NURSE PRACTITIONER

## 2021-01-06 ASSESSMENT — ENCOUNTER SYMPTOMS
EYE DISCHARGE: 0
COUGH: 0
ABDOMINAL PAIN: 0
CONSTIPATION: 0
EYE PAIN: 0
VOMITING: 0
SORE THROAT: 0
BACK PAIN: 0
EYES NEGATIVE: 1
RESPIRATORY NEGATIVE: 1
SHORTNESS OF BREATH: 0
NAUSEA: 0
GASTROINTESTINAL NEGATIVE: 1
BLOOD IN STOOL: 0
DIARRHEA: 0
WHEEZING: 0
EYE REDNESS: 0

## 2021-01-06 NOTE — PROGRESS NOTES
Progress Note      Pt Name: Dee Mccall  YOB: 1945  MRN: [id number]    Date of evaluation: 1/6/2021  History Obtained From:  patient, electronic medical record    CHIEF COMPLAINT:    Chief Complaint   Patient presents with    Follow-up     Chronic ITP (idiopathic thrombocytopenic purpura) ([address]    Anemia     HISTORY OF PRESENT ILLNESS:    Dee Mccall is a 70 y.o.  female with significant PMH of suspected ITP and anemia. [place] was initiated on Promacta in May 2018 and has had an excellent response to treatment. Her Promacta dose has been decreased to 12.5 mg daily, previously receiving max dose of 75 mg daily. In relation to her anemia she takes ferrous sulfate 325 twice daily. [place] returns today in scheduled follow-up for evaluation, side effect monitoring, lab monitoring and further treatment recommendations. She presents today with no new complaints and indicates that overall she is doing well other than her chronic fatigue with no significant change from previous evaluation. She continues to tolerate the Promacta and ferrous sulfate without difficulty. CBC today reveals a platelet count of 967,902 and a hemoglobin of 10.5, both are stable and will continue with current dosing of Promacta and iron replacement. [place] voiced concern related to be able to financially obtain the [place], she has reapplied for her annual assistance. She reports having 17 days of Promacta on hand. Promacta: Information obtained from up-to-date:    HEMATOLOGY HISTORY:   Diagnosis: Thrombocytopenia with differential diagnosis of ITP    Sjogrens  Iron deficiency anemia    Treatment summary:  Initiation of prednisone at 50 mg daily on 4/27/2017 and completed on 6/22/2017 after weekly dose titration of 10 mg weekly. Prednisone 15 mg daily ×14 days, completed on 11/18/2017 for planned colonoscopy for positive occult stools.    5/2/2018 -initiation of Promacta 50 mg by mouth daily. Dose increased to 75 mg daily on 10/3/2018.   6/21/2019- decreased Promacta dosing to 50 mg   11/25/2019-decrease Promacta to 25 mg daily  1/21/2020-decrease Promacta 12.5 mg daily  7/15/2020-ferrous sulfate 325 mg 1 tablet p.o. x7 days then increase to twice daily if tolerated    HEMATOLOGY HISTORY:  [place] was seen in initial hematology consultation on 4/27/2017 for new onset of thrombocytopenia and anemia in referral from Dr. Natalia Kenny. [place] has a history of lupus and a history of acute blood loss secondary to a vaginal hysterectomy with anterior posterior vaginal repair, and cystoscopy on 12/14/2016. She is followed by Dr. Halina Gonzalez in [place] for her lupus and denies any significant flareups. She denies any previous history of thrombocytopenia or bleeding tendencies. Review of her present medication list did not reveal any medications that cause significant thrombocytopenia and she denies drinking any tonic water. CBC HISTORY:  12/19/2016 - WBC 8.36, hemoglobin 7.4, hematocrit 20.5, MCV 79.2 and a platelet count of 805,901.   12/20/2016 - WBC 4.67, neutrophils 80.9%, hemoglobin 10.2, hematocrit 29, MCV 82.6 and a platelet count of 208,331.   12/22/2016 - WBC 6.4, neutrophils 70.8%, hemoglobin 10, hematocrit 29.8, MCV 84.7, and a platelet count 483,257.   12/28/2016 - WBC 6.6, neutrophils 66.2%, hemoglobin 12.3, hematocrit 37.1, MCV 86, and a platelet count of 71,125.   4/17/2017 4/27/2017- WBC 5.17, neutrophils 59%, hemoglobin 10.3, hematocrit 33.3, MCV 92.5 and a platelet count of 77,750. Normocytic, normochromic anemia- with a history of acute blood loss (12/2016). Hemoglobin has stabilized.   Serum studies on 4/27/2017:  Iron 50   TIBC 276   Iron saturation 18%   Ferritin 466 (H)   Vitamin B12 426   Folate 17.9   Haptoglobin 116   Reticulocyte percent 1.3    Repeat serology studies on 9/21/2017:  Iron 66   TIBC 283   Iron saturation 23%   Ferritin 263   Vitamin B12 448   Folate on 6/20/2019 for platelet count being > 200,000, platelets 733,322. History of lupus: [place] is followed by Dr. Kalen Ewing in [place]. She denies any significant joint pain, fatigue, febrile illness, or skin lesions, these symptoms are common with lupus. She reports a hiostory of hair loss and this symptom can be associated with lupus. She denies any recent flareups or lab testing related to her lupus diagnosis. [place] was evaluated by Dr. Army Dey on 5/26/2017 and he is suggesting that [place] has a differential diagnosis of Sjogern's. Serum studies on 4/27/2017  ds- DNA antibody 1   Aguilar antibodies <0.2   Sjogren's antibodies (SSA) > 8.0 (H)   Sjogren's antibodies (SSB) 5.8 (H)   Antichromatin antibodies <0.2   C4 20   Centromere B antibody <0.2    Bone marrow aspiration biopsy on 4/17/2018 documented low normal cellular bone marrow for age (~20-30%) with trilineage hematopoiesis and a blast count of ~1%. Many small lymphoid aggregates with mixed CD3+ T and CD20+ B-cells seen on clot section (likely representing underlying autoimmune disease). No morphological or cytogenic (FISH) evidence of a myelodysplastic syndrome. No diagnostic evidence of lymphoroliferative disorder, granulomas or metastatic malignancy. Normal female karyotype. Thrombocytopenia along with adequate number of megakaryocytes is consistent with peripheral destruction/sequestration of platelets. Dr. Jesus Jenkins has recommended initiation of Promacta. The goal is to achieve and maintain a platelet count equal to or greater than 50,000, to reduce the risk of bleeding. Promacta will be initiated at 50 mg daily. The Promacta, dose can be increased after 2 weeks of dosing with a max dosing of 75 mg daily. [place] is knowledgeable of the possible side effects to include but not limited to fatigue, headache, insomnia, pruritus, anemia, and hepatotoxicity.     Past Medical History:    Past Medical History:   Diagnosis Date    Arrhythmia  CAD (coronary artery disease)     Hyperlipidemia     Hypertension     Immune thrombocytopenic purpura ([facility] 7/26/2019    Sleep apnea     Systemic lupus erythematosus ([facility] 7/26/2019    Thrombocytopenia, unspecified ([facility] 7/26/2019       Past Surgical History:    Past Surgical History:   Procedure Laterality Date    APPENDECTOMY      BREAST SURGERY      FOOT SURGERY      HYSTERECTOMY      TUBAL LIGATION         Current Medications:    Current Outpatient Medications   Medication Sig Dispense Refill    ferrous sulfate (IRON 325) 325 (65 Fe) MG tablet Take 1 tablet by mouth 2 times daily 60 tablet 5    eltrombopag (PROMACTA) 12.5 MG TABS tablet Take 1 tablet by mouth daily 30 tablet 11    metoprolol succinate (TOPROL XL) 50 MG extended release tablet Take 50 mg by mouth daily      lisinopril-hydrochlorothiazide (PRINZIDE;ZESTORETIC) 20-25 MG per tablet Take 1 tablet by mouth daily      hydroxychloroquine (PLAQUENIL) 200 MG tablet Take by mouth daily       No current facility-administered medications for this visit. Allergies: Allergies   Allergen Reactions    Ondansetron Hcl     Oxycodone-Aspirin Other (See Comments)       Social History:    Social History     Tobacco Use    Smoking status: Never Smoker    Smokeless tobacco: Never Used   Substance Use Topics    Alcohol use: Not Currently    Drug use: Never       Family History:   Family History   Problem Relation Age of Onset    Heart Disease Mother     Cancer Sister     Cancer Brother        Vitals:  Vitals:    01/06/21 1021   BP: 130/84   Pulse: 80   Temp: 96.9 °F (36.1 °C)   SpO2: 98%   Weight: 112 lb 6.4 oz (51 kg)   Height: 5' (1.524 m)        Subjective   REVIEW OF SYSTEMS:   Review of Systems   Constitutional: Positive for fatigue. Negative for chills, diaphoresis and fever. HENT: Negative. Negative for congestion, ear pain, hearing loss, nosebleeds, sore throat and tinnitus. Eyes: Negative.   Negative for pain, discharge and redness. Respiratory: Negative. Negative for cough, shortness of breath and wheezing. Cardiovascular: Negative. Negative for chest pain, palpitations and leg swelling. Gastrointestinal: Negative. Negative for abdominal pain, blood in stool, constipation, diarrhea, nausea and vomiting. Endocrine: Negative for polydipsia. Genitourinary: Negative for dysuria, flank pain, frequency, hematuria and urgency. Musculoskeletal: Negative. Negative for back pain, myalgias and neck pain. Skin: Negative. Negative for rash. Neurological: Negative. Negative for dizziness, tremors, seizures, weakness and headaches. Hematological: Does not bruise/bleed easily. Psychiatric/Behavioral: Negative. The patient is not nervous/anxious. Objective   PHYSICAL EXAM:  Physical Exam  Vitals signs reviewed. Constitutional:       General: She is not in acute distress. Appearance: She is well-developed. She is not diaphoretic. HENT:      Head: Normocephalic and atraumatic. Mouth/Throat:      Pharynx: Uvula midline. Tonsils: No tonsillar exudate. Eyes:      General: Lids are normal. No scleral icterus. Right eye: No discharge. Left eye: No discharge. Conjunctiva/sclera: Conjunctivae normal.      Pupils: Pupils are equal, round, and reactive to light. Neck:      Musculoskeletal: Normal range of motion and neck supple. Thyroid: No thyroid mass or thyromegaly. Vascular: No JVD. Trachea: Trachea normal. No tracheal deviation. Cardiovascular:      Rate and Rhythm: Normal rate and regular rhythm. Heart sounds: Normal heart sounds. No murmur. No friction rub. No gallop. Pulmonary:      Effort: Pulmonary effort is normal. No respiratory distress. Breath sounds: Normal breath sounds. No wheezing or rales. Chest:      Chest wall: No tenderness. Abdominal:      General: Bowel sounds are normal. There is no distension.       Palpations: Abdomen is soft. There is no mass. Tenderness: There is no abdominal tenderness. There is no guarding or rebound. Hernia: No hernia is present. Musculoskeletal:         General: No tenderness or deformity. Comments: Range of motion within normal limits x4 extremities   Skin:     General: Skin is warm. Coloration: Skin is not pale. Findings: No erythema or rash. Neurological:      Mental Status: She is alert and oriented to person, place, and time. Cranial Nerves: No cranial nerve deficit. Coordination: Coordination normal.   Psychiatric:         Behavior: Behavior normal.         Thought Content: Thought content normal.         Labs:  Lab Results   Component Value Date    WBC 5.58 01/06/2021    HGB 10.5 (L) 01/06/2021    HCT 31.7 (L) 01/06/2021    MCV 93.8 01/06/2021     01/06/2021     Lab Results   Component Value Date    NEUTROABS 3.65 01/06/2021     ASSESSMENT/PLAN:      1. Chronic ITP (idiopathic thrombocytopenic purpura), continues to have an excellent response with Promacta. Continues to take Promacta 12.5 mg daily since January 2020 and has maintained a platelet count >533,034. If platelet count were to become greater than 400,000 would consider discontinuing Promacta at that time. She has a platelet count today of 211,000, this is stable although slightly declined from previous evaluation of 288,000. She reports tolerating the Promacta without difficulty other than occasional nausea. [place] voiced concern related to be able to financially obtain the [place], she has reapplied for her annual assistance. She reports having 17 days of Promacta on hand.     -Continue Promacta 12.5 mg daily  -Contact the clinic if financial assistance is not obtained, so further recommendations can be made      2. Iron deficiency anemia secondary to inadequate dietary iron intake. Hemoglobin stable at 10.5 with an MCV of 93.8.   Continues to tolerate oral ferrous sulfate 325 mg p.o. twice daily without difficulty. Iron substrates were last evaluated on 9/2/2020 that documented a iron of 82, iron saturation 21%, TIBC 400 and a ferritin of 48.4. Drake Guardado -Continue ferrous sulfate 325 mg p.o. twice daily    3. Chronic fatigue, grade 1, stable with no change from previous evaluation. FOLLOW UP:   1. Follow-up appointment given for 2 months, sooner if needed  2. Continue to follow with other medical providers as recommended    Discussed precautions related to [address] and being at increased risk. Discussed proper handwashing to be done frequently, limit exposure to other individuals and maintain social distancing of 6 feet. Recommend contacting primary care provider if having respiratory symptoms for further recommendations and consideration for testing.     (Please note that portions of this note were completed with a voice recognition program. Efforts were made to edit the dictations but occasionally words are mis-transcribed,  Also, portions of this note have been copied forward, however, changed to reflect the most current clinical status of this patient.)    Electronically signed by BETTINA Duncan on 1/23/2021 at 12:13 PM

## 2021-01-25 ENCOUNTER — OFFICE VISIT (OUTPATIENT)
Dept: CARDIOLOGY | Facility: CLINIC | Age: 76
End: 2021-01-25

## 2021-01-25 VITALS
BODY MASS INDEX: 21.99 KG/M2 | DIASTOLIC BLOOD PRESSURE: 77 MMHG | SYSTOLIC BLOOD PRESSURE: 121 MMHG | WEIGHT: 112 LBS | HEIGHT: 60 IN | HEART RATE: 74 BPM

## 2021-01-25 DIAGNOSIS — I10 ESSENTIAL HYPERTENSION: Primary | ICD-10-CM

## 2021-01-25 DIAGNOSIS — R00.2 PALPITATIONS: ICD-10-CM

## 2021-01-25 DIAGNOSIS — I27.20 PULMONARY HYPERTENSION (HCC): ICD-10-CM

## 2021-01-25 PROCEDURE — 93000 ELECTROCARDIOGRAM COMPLETE: CPT | Performed by: INTERNAL MEDICINE

## 2021-01-25 PROCEDURE — 99214 OFFICE O/P EST MOD 30 MIN: CPT | Performed by: INTERNAL MEDICINE

## 2021-01-25 NOTE — PROGRESS NOTES
Reason for Visit: cardiovascular follow up.    HPI:  Sarah Espinoza is a 75 y.o. female is here today for follow-up.  Her platelets are up to 211 and she is down to 12.5 mg on the Promacta.  She is trying to get assistance with the cost.  She is doing well from a cardiac standpoint.  Her breathing is good and she only has mild shortness of breath.  She is bored spending so much time at home.  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  She has no problems with any of her activities of daily living.  She denies any chest pain, palpitations, dizziness, syncope, PND, or orthopnea.    Previous Cardiac Testing and Procedures:  - Echo (12/21/2016) EF 55%, grade 1 diastolic dysfunction, RVSP 45-55 mmHg, normal RV size and function  - Nuclear stress (01/09/2017) normal myocardial perfusion with no evidence of ischemia, EF 70%  - Lipid panel (12/20/16) 113/26/69/103    Patient Active Problem List   Diagnosis   • Essential hypertension   • Lupus (CMS/HCC)   • Arthritis   • Female bladder prolapse   • Heart palpitations   • Seasonal allergies   • Uterine prolapse   • Pulmonary hypertension (CMS/HCC)   • Sjogren's disease (CMS/HCC)   • Heme positive stool   • Thrombocytopenia (CMS/HCC)       Social History     Tobacco Use   • Smoking status: Never Smoker   • Smokeless tobacco: Never Used   Substance Use Topics   • Alcohol use: No   • Drug use: No       Family History   Problem Relation Age of Onset   • Heart disease Mother    • No Known Problems Father    • No Known Problems Sister    • Kidney cancer Brother    • Leukemia Sister    • Breast cancer Neg Hx    • Colon cancer Neg Hx    • Esophageal cancer Neg Hx        The following portions of the patient's history were reviewed and updated as appropriate: allergies, current medications, past family history, past medical history, past social history, past surgical history and problem list.      Current Outpatient Medications:   •   "eltrombopag (PROMACTA) 75 MG tablet tablet, Take 12.5 mg by mouth Daily., Disp: , Rfl:   •  Ferrous Sulfate (IRON PO), Take  by mouth., Disp: , Rfl:   •  hydroxychloroquine (PLAQUENIL) 200 MG tablet, Take  by mouth Daily., Disp: , Rfl:   •  lisinopril-hydrochlorothiazide (PRINZIDE,ZESTORETIC) 20-25 MG per tablet, Take 1 tablet by mouth Daily., Disp: 90 tablet, Rfl: 3  •  metoprolol succinate XL (TOPROL-XL) 50 MG 24 hr tablet, Take 1.5 tablets by mouth Daily., Disp: 135 tablet, Rfl: 3    Review of Systems   Constitution: Negative for chills and fever.   Cardiovascular: Negative for chest pain and paroxysmal nocturnal dyspnea.   Respiratory: Negative for cough and shortness of breath.    Skin: Negative for rash.   Gastrointestinal: Negative for abdominal pain and heartburn.   Neurological: Negative for dizziness and numbness.       Objective   /77   Pulse 74   Ht 152.4 cm (60\")   Wt 50.8 kg (112 lb)   BMI 21.87 kg/m²   Constitutional:       Appearance: Well-developed and normal weight.   HENT:      Head: Normocephalic and atraumatic.   Pulmonary:      Effort: Pulmonary effort is normal.      Breath sounds: Normal breath sounds.   Cardiovascular:      Normal rate. Regular rhythm.      Murmurs: There is no murmur.      No gallop.   Edema:     Peripheral edema absent.   Skin:     General: Skin is warm and dry.   Neurological:      Mental Status: Alert and oriented to person, place, and time.         ECG 12 Lead    Date/Time: 1/25/2021 10:59 AM  Performed by: Anant Hernandez MD  Authorized by: Anant Hernandez MD   Comparison: compared with previous ECG from 12/6/2019  Comparison to previous ECG: Criteria for LVH are no longer present  Rhythm: sinus rhythm  Rate: normal    Clinical impression: normal ECG              ICD-10-CM ICD-9-CM   1. Essential hypertension  I10 401.9   2. Pulmonary hypertension (CMS/HCC)  I27.20 416.8   3. Palpitations  R00.2 785.1         Assessment/Plan:  1. Systemic hypertension: " Blood pressure remains well controlled.  Continue lisinopril, hydrochlorothiazide, and metoprolol.     2.  Pulmonary hypertension: RVSP 45-55 mmHg on echo from 12/2016.  This is felt to be related to Lupus and Sjogren's diasease.  Her breathing remains stable.     3.  Heart palpitations: No recent symptoms.  Normal EKG today.  Continue metoprolol.

## 2021-01-26 DIAGNOSIS — R00.2 HEART PALPITATIONS: ICD-10-CM

## 2021-01-26 DIAGNOSIS — I10 ESSENTIAL HYPERTENSION: ICD-10-CM

## 2021-01-26 RX ORDER — LISINOPRIL AND HYDROCHLOROTHIAZIDE 25; 20 MG/1; MG/1
TABLET ORAL
Qty: 90 TABLET | Refills: 4 | Status: SHIPPED | OUTPATIENT
Start: 2021-01-26 | End: 2022-01-31 | Stop reason: SDUPTHER

## 2021-01-26 RX ORDER — METOPROLOL SUCCINATE 50 MG/1
TABLET, EXTENDED RELEASE ORAL
Qty: 135 TABLET | Refills: 4 | Status: SHIPPED | OUTPATIENT
Start: 2021-01-26 | End: 2022-01-31 | Stop reason: SDUPTHER

## 2021-02-03 ENCOUNTER — TELEPHONE (OUTPATIENT)
Dept: HEMATOLOGY | Age: 76
End: 2021-02-03

## 2021-02-03 ENCOUNTER — HOSPITAL ENCOUNTER (OUTPATIENT)
Dept: INFUSION THERAPY | Age: 76
Discharge: HOME | End: 2021-02-03
Payer: MEDICARE

## 2021-02-03 DIAGNOSIS — D69.3 CHRONIC ITP (IDIOPATHIC THROMBOCYTOPENIC PURPURA) (HCC): ICD-10-CM

## 2021-02-03 DIAGNOSIS — D69.3 CHRONIC ITP (IDIOPATHIC THROMBOCYTOPENIC PURPURA) (HCC): Primary | ICD-10-CM

## 2021-02-03 LAB
BASOPHILS ABSOLUTE: 0.04 K/UL (ref 0.01–0.08)
BASOPHILS RELATIVE PERCENT: 0.7 % (ref 0.1–1.2)
EOSINOPHILS ABSOLUTE: 0.18 K/UL (ref 0.04–0.54)
EOSINOPHILS RELATIVE PERCENT: 3.1 % (ref 0.7–7)
HCT VFR BLD CALC: 31.9 % (ref 34.1–44.9)
HEMOGLOBIN: 10.6 G/DL (ref 11.2–15.7)
LYMPHOCYTES ABSOLUTE: 1.38 K/UL (ref 1.18–3.74)
LYMPHOCYTES RELATIVE PERCENT: 23.5 % (ref 19.3–53.1)
MCH RBC QN AUTO: 31.4 PG (ref 25.6–32.2)
MCHC RBC AUTO-ENTMCNC: 33.2 G/DL (ref 32.3–35.5)
MCV RBC AUTO: 94.4 FL (ref 79.4–94.8)
MONOCYTES ABSOLUTE: 0.46 K/UL (ref 0.24–0.82)
MONOCYTES RELATIVE PERCENT: 7.8 % (ref 4.7–12.5)
NEUTROPHILS ABSOLUTE: 3.81 K/UL (ref 1.56–6.13)
NEUTROPHILS RELATIVE PERCENT: 64.9 % (ref 34–71.1)
PDW BLD-RTO: 12.7 % (ref 11.7–14.4)
PLATELET # BLD: 124 K/UL (ref 182–369)
PMV BLD AUTO: 10.5 FL (ref 7.4–10.4)
RBC # BLD: 3.38 M/UL (ref 3.93–5.22)
WBC # BLD: 5.87 K/UL (ref 3.98–10.04)

## 2021-02-03 PROCEDURE — 85025 COMPLETE CBC W/AUTO DIFF WBC: CPT

## 2021-02-03 NOTE — TELEPHONE ENCOUNTER
Spoke with Ms. Luc Ching related to her platelet count dropping to 124,000, she reports that she has been without her Promacta for 2 weeks and has been unable to speak with anyone at the pharmacy. Today she spoke with Yessica Bah in our clinic and can was able to get the pharmacy on the line. Ms. Luc Ching will be receiving her Promacta on 2/5/2021. She will resume the 12.5 mg Promacta daily and return to clinic on 2/18/2020 at 9 AM for repeat CBC.

## 2021-02-18 ENCOUNTER — HOSPITAL ENCOUNTER (OUTPATIENT)
Dept: INFUSION THERAPY | Age: 76
End: 2021-02-18
Payer: MEDICARE

## 2021-02-24 ENCOUNTER — HOSPITAL ENCOUNTER (OUTPATIENT)
Dept: INFUSION THERAPY | Age: 76
Discharge: HOME | End: 2021-02-24
Payer: MEDICARE

## 2021-02-24 DIAGNOSIS — D69.3 CHRONIC ITP (IDIOPATHIC THROMBOCYTOPENIC PURPURA) (HCC): ICD-10-CM

## 2021-02-24 LAB
BASOPHILS ABSOLUTE: 0.03 K/UL (ref 0.01–0.08)
BASOPHILS RELATIVE PERCENT: 0.6 % (ref 0.1–1.2)
EOSINOPHILS ABSOLUTE: 0.1 K/UL (ref 0.04–0.54)
EOSINOPHILS RELATIVE PERCENT: 1.9 % (ref 0.7–7)
HCT VFR BLD CALC: 32.8 % (ref 34.1–44.9)
HEMOGLOBIN: 10.9 G/DL (ref 11.2–15.7)
LYMPHOCYTES ABSOLUTE: 1.31 K/UL (ref 1.18–3.74)
LYMPHOCYTES RELATIVE PERCENT: 24.7 % (ref 19.3–53.1)
MCH RBC QN AUTO: 31.2 PG (ref 25.6–32.2)
MCHC RBC AUTO-ENTMCNC: 33.2 G/DL (ref 32.3–35.5)
MCV RBC AUTO: 94 FL (ref 79.4–94.8)
MONOCYTES ABSOLUTE: 0.54 K/UL (ref 0.24–0.82)
MONOCYTES RELATIVE PERCENT: 10.2 % (ref 4.7–12.5)
NEUTROPHILS ABSOLUTE: 3.32 K/UL (ref 1.56–6.13)
NEUTROPHILS RELATIVE PERCENT: 62.6 % (ref 34–71.1)
PDW BLD-RTO: 12.6 % (ref 11.7–14.4)
PLATELET # BLD: 211 K/UL (ref 182–369)
PMV BLD AUTO: 10.1 FL (ref 7.4–10.4)
RBC # BLD: 3.49 M/UL (ref 3.93–5.22)
WBC # BLD: 5.3 K/UL (ref 3.98–10.04)

## 2021-02-24 PROCEDURE — 85025 COMPLETE CBC W/AUTO DIFF WBC: CPT

## 2021-03-10 ENCOUNTER — HOSPITAL ENCOUNTER (OUTPATIENT)
Dept: INFUSION THERAPY | Age: 76
End: 2021-03-10
Payer: MEDICARE

## 2021-04-01 ENCOUNTER — OFFICE VISIT (OUTPATIENT)
Dept: HEMATOLOGY | Age: 76
End: 2021-04-01
Payer: MEDICARE

## 2021-04-01 ENCOUNTER — HOSPITAL ENCOUNTER (OUTPATIENT)
Dept: INFUSION THERAPY | Age: 76
Discharge: HOME | End: 2021-04-01
Payer: MEDICARE

## 2021-04-01 VITALS
OXYGEN SATURATION: 98 % | DIASTOLIC BLOOD PRESSURE: 60 MMHG | TEMPERATURE: 96.9 F | HEIGHT: 60 IN | HEART RATE: 78 BPM | BODY MASS INDEX: 21.95 KG/M2 | SYSTOLIC BLOOD PRESSURE: 130 MMHG

## 2021-04-01 DIAGNOSIS — D50.8 IRON DEFICIENCY ANEMIA SECONDARY TO INADEQUATE DIETARY IRON INTAKE: ICD-10-CM

## 2021-04-01 DIAGNOSIS — R53.82 CHRONIC FATIGUE: ICD-10-CM

## 2021-04-01 DIAGNOSIS — D69.3 CHRONIC ITP (IDIOPATHIC THROMBOCYTOPENIC PURPURA) (HCC): Primary | ICD-10-CM

## 2021-04-01 DIAGNOSIS — D69.3 CHRONIC ITP (IDIOPATHIC THROMBOCYTOPENIC PURPURA) (HCC): ICD-10-CM

## 2021-04-01 LAB
ALBUMIN SERPL-MCNC: 4.5 G/DL (ref 3.5–5.2)
ALP BLD-CCNC: 64 U/L (ref 35–104)
ALT SERPL-CCNC: 12 U/L (ref 9–52)
ANION GAP SERPL CALCULATED.3IONS-SCNC: 13 MMOL/L (ref 7–19)
AST SERPL-CCNC: 25 U/L (ref 14–36)
BASOPHILS ABSOLUTE: 0.03 K/UL (ref 0.01–0.08)
BASOPHILS RELATIVE PERCENT: 0.7 % (ref 0.1–1.2)
BILIRUB SERPL-MCNC: 0.4 MG/DL (ref 0.2–1.3)
BUN BLDV-MCNC: 16 MG/DL (ref 7–17)
CALCIUM SERPL-MCNC: 9.8 MG/DL (ref 8.4–10.2)
CHLORIDE BLD-SCNC: 98 MMOL/L (ref 98–111)
CO2: 27 MMOL/L (ref 22–29)
CREAT SERPL-MCNC: 1.2 MG/DL (ref 0.5–1)
EOSINOPHILS ABSOLUTE: 0.1 K/UL (ref 0.04–0.54)
EOSINOPHILS RELATIVE PERCENT: 2.2 % (ref 0.7–7)
FERRITIN: 76.4 NG/ML (ref 11.1–264)
FOLATE: 6.7 NG/ML (ref 2.7–20)
GFR NON-AFRICAN AMERICAN: 44
GLOBULIN: 2.3 G/DL
GLUCOSE BLD-MCNC: 87 MG/DL (ref 74–106)
HCT VFR BLD CALC: 31.1 % (ref 34.1–44.9)
HEMOGLOBIN: 10.5 G/DL (ref 11.2–15.7)
IRON SATURATION: 18 % (ref 14–50)
IRON: 72 UG/DL (ref 37–170)
LACTATE DEHYDROGENASE: 481 U/L (ref 313–618)
LYMPHOCYTES ABSOLUTE: 1.03 K/UL (ref 1.18–3.74)
LYMPHOCYTES RELATIVE PERCENT: 22.7 % (ref 19.3–53.1)
MCH RBC QN AUTO: 30.8 PG (ref 25.6–32.2)
MCHC RBC AUTO-ENTMCNC: 33.8 G/DL (ref 32.3–35.5)
MCV RBC AUTO: 91.2 FL (ref 79.4–94.8)
MONOCYTES ABSOLUTE: 0.41 K/UL (ref 0.24–0.82)
MONOCYTES RELATIVE PERCENT: 9 % (ref 4.7–12.5)
NEUTROPHILS ABSOLUTE: 2.97 K/UL (ref 1.56–6.13)
NEUTROPHILS RELATIVE PERCENT: 65.4 % (ref 34–71.1)
PDW BLD-RTO: 12.4 % (ref 11.7–14.4)
PLATELET # BLD: 249 K/UL (ref 182–369)
PMV BLD AUTO: 9.2 FL (ref 7.4–10.4)
POTASSIUM SERPL-SCNC: 3.9 MMOL/L (ref 3.5–5.1)
RBC # BLD: 3.41 M/UL (ref 3.93–5.22)
SODIUM BLD-SCNC: 138 MMOL/L (ref 137–145)
TOTAL IRON BINDING CAPACITY: 400 UG/DL (ref 265–497)
TOTAL PROTEIN: 6.7 G/DL (ref 6.3–8.2)
VITAMIN B-12: 276 PG/ML (ref 239–931)
WBC # BLD: 4.54 K/UL (ref 3.98–10.04)

## 2021-04-01 PROCEDURE — 99214 OFFICE O/P EST MOD 30 MIN: CPT | Performed by: NURSE PRACTITIONER

## 2021-04-01 PROCEDURE — 83540 ASSAY OF IRON: CPT

## 2021-04-01 PROCEDURE — 1123F ACP DISCUSS/DSCN MKR DOCD: CPT | Performed by: NURSE PRACTITIONER

## 2021-04-01 PROCEDURE — 82728 ASSAY OF FERRITIN: CPT

## 2021-04-01 PROCEDURE — G8427 DOCREV CUR MEDS BY ELIG CLIN: HCPCS | Performed by: NURSE PRACTITIONER

## 2021-04-01 PROCEDURE — 82607 VITAMIN B-12: CPT

## 2021-04-01 PROCEDURE — 83550 IRON BINDING TEST: CPT

## 2021-04-01 PROCEDURE — 80053 COMPREHEN METABOLIC PANEL: CPT

## 2021-04-01 PROCEDURE — 99211 OFF/OP EST MAY X REQ PHY/QHP: CPT

## 2021-04-01 PROCEDURE — 83615 LACTATE (LD) (LDH) ENZYME: CPT

## 2021-04-01 PROCEDURE — 1090F PRES/ABSN URINE INCON ASSESS: CPT | Performed by: NURSE PRACTITIONER

## 2021-04-01 PROCEDURE — 85025 COMPLETE CBC W/AUTO DIFF WBC: CPT

## 2021-04-01 PROCEDURE — G8420 CALC BMI NORM PARAMETERS: HCPCS | Performed by: NURSE PRACTITIONER

## 2021-04-01 PROCEDURE — 1036F TOBACCO NON-USER: CPT | Performed by: NURSE PRACTITIONER

## 2021-04-01 PROCEDURE — G8400 PT W/DXA NO RESULTS DOC: HCPCS | Performed by: NURSE PRACTITIONER

## 2021-04-01 PROCEDURE — 82746 ASSAY OF FOLIC ACID SERUM: CPT

## 2021-04-01 PROCEDURE — 3017F COLORECTAL CA SCREEN DOC REV: CPT | Performed by: NURSE PRACTITIONER

## 2021-04-01 PROCEDURE — 4040F PNEUMOC VAC/ADMIN/RCVD: CPT | Performed by: NURSE PRACTITIONER

## 2021-04-01 ASSESSMENT — ENCOUNTER SYMPTOMS
BLOOD IN STOOL: 0
RESPIRATORY NEGATIVE: 1
EYE PAIN: 0
EYES NEGATIVE: 1
VOMITING: 0
SHORTNESS OF BREATH: 0
DIARRHEA: 0
GASTROINTESTINAL NEGATIVE: 1
ABDOMINAL PAIN: 0
EYE DISCHARGE: 0
CONSTIPATION: 0
NAUSEA: 0
BACK PAIN: 0
WHEEZING: 0
SORE THROAT: 0
COUGH: 0
EYE REDNESS: 0

## 2021-04-01 NOTE — PROGRESS NOTES
Progress Note      Pt Name: Tamar Arriaga  YOB: 1945  MRN: [id number]    Date of evaluation: 4/1/2021  History Obtained From:  patient, electronic medical record    CHIEF COMPLAINT:    Chief Complaint   Patient presents with    Follow-up     Chronic ITP (idiopathic thrombocytopenic purpura)    Anemia    Fatigue     HISTORY OF PRESENT ILLNESS:    Tamar Arriaga is a 78 y.o.  female with significant PMH of suspected ITP and anemia. [place] was initiated on Promacta in May 2018 and has had an excellent response to treatment. Her Promacta dose has been decreased to 12.5 mg daily, previously receiving max dose of 75 mg daily. In relation to her anemia she takes ferrous sulfate. [place] returns today in scheduled follow-up for evaluation, side effect monitoring, lab monitoring and further treatment recommendations. She presents today with no new complaints other than her chronic fatigue and indicates overall she is doing well. She reports that she was experiencing nausea with taking ferrous sulfate twice daily and is currently tolerating once daily without difficulty. She continues to be compliant with her Promacta 12.5 mg daily and tolerating without difficulty. CBC was reviewed today, is within acceptable limits and is documented below. Platelet count is stable at 249,000 and will continue with current dosing of Promacta. Up-to-Date  Promacta: Information obtained from     HEMATOLOGY HISTORY:   Diagnosis: Thrombocytopenia with differential diagnosis of ITP    Sjogrens  Iron deficiency anemia    Treatment summary:  Initiation of prednisone at 50 mg daily on 4/27/2017 and completed on 6/22/2017 after weekly dose titration of 10 mg weekly. Prednisone 15 mg daily ×14 days, completed on 11/18/2017 for planned colonoscopy for positive occult stools. 5/2/2018 -initiation of Promacta 50 mg by mouth daily. Dose increased to 75 mg daily on 10/3/2018. 6/21/2019- decreased Promacta dosing to 50 mg   11/25/2019-decrease Promacta to 25 mg daily  1/21/2020-decrease Promacta 12.5 mg daily  7/15/2020-ferrous sulfate 325 mg 1 tablet p.o. x7 days then increase to twice daily if tolerated, intolerant to twice daily due to nausea. Tolerating once daily. HEMATOLOGY HISTORY:  [place] was seen in initial hematology consultation on 4/27/2017 for new onset of thrombocytopenia and anemia in referral from Dr. Kristina Garrido. [place] has a history of lupus and a history of acute blood loss secondary to a vaginal hysterectomy with anterior posterior vaginal repair, and cystoscopy on 12/14/2016. She is followed by Dr. Priyank Oquendo in [place] for her lupus and denies any significant flareups. She denies any previous history of thrombocytopenia or bleeding tendencies. Review of her present medication list did not reveal any medications that cause significant thrombocytopenia and she denies drinking any tonic water. CBC HISTORY:  12/19/2016 - WBC 8.36, hemoglobin 7.4, hematocrit 20.5, MCV 79.2 and a platelet count of 420,402.   12/20/2016 - WBC 4.67, neutrophils 80.9%, hemoglobin 10.2, hematocrit 29, MCV 82.6 and a platelet count of 327,650.   12/22/2016 - WBC 6.4, neutrophils 70.8%, hemoglobin 10, hematocrit 29.8, MCV 84.7, and a platelet count 547,866.   12/28/2016 - WBC 6.6, neutrophils 66.2%, hemoglobin 12.3, hematocrit 37.1, MCV 86, and a platelet count of 32,176.   4/17/2017 4/27/2017- WBC 5.17, neutrophils 59%, hemoglobin 10.3, hematocrit 33.3, MCV 92.5 and a platelet count of 40,389. Normocytic, normochromic anemia- with a history of acute blood loss (12/2016). Hemoglobin has stabilized.   Serum studies on 4/27/2017:  Iron 50   TIBC 276   Iron saturation 18%   Ferritin 466 (H)   Vitamin B12 426   Folate 17.9   Haptoglobin 116   Reticulocyte percent 1.3    Repeat serology studies on 9/21/2017:  Iron 66   TIBC 283   Iron saturation 23%   Ferritin 263   Vitamin B12 448 Folate 11.9   Referral potent 9.0   Reticulocyte count 1.2   IgG 625   IgA 204   IgM 204    10/6/2017 occult stool positive ×1. Colonoscopy on 11/17/2017 by Dr. Gena Marrero revealed diverticulosis in the sigmoid and descending colon. Localized mild inflammation was found in the descending colon secondary to ischemic colitis. Serology studies on 8/13/2018:  Iron 163   TIBC 343   Iron saturation 48%   Ferritin 269   Vitamin B12 319   Folate 6.0   Erythropoietin 8.3   Reticulocyte count 1%   Creatinine 1.06   GFR 53       Serology studies on 9/24/2019:  · Iron 94  · TIBC 362  · Iron saturation 26%  · Ferritin 52.5  · Folate 5.52  · Vitamin B12 270      Serology studies on 6/3/2020:  · iron of 80  · TIBC 419  · Iron saturation 19%  · Ferritin 33.2  · Reticulocyte count of 1.5%  · Creatinine 1.0/GFR 54    Serology studies on 9/2/2020:  · Ferritin 48.4  · Iron 82  · Iron saturation 21%  · TIBC 400  · Creatinine 1.0/GFR 54    Thrombocytopenia- no known identifiable cause of onset, differential diagnosis of ITP. Review of medications did not reveal any known medications that induce thrombocytopenia. [place] denied any bleeding tendencies or significant ecchymosis. Serum studies on 4/27/2017:     Anticardiolipin IgA and IgG <9, IgM 12   PTT 24, PT 9.7, and INR 0.9   Rheumatoid factor 20.8 (H)   MANUEL positive   CRP <0.3   ESR 4   hepatitis C antibody <0.1   HIV negative   M spike not observed     Ultrasound of the spleen on 4/28/2017 revealed no abnormalities and spleen measures 8.8 x 3.2 x 3.1 cm. Promacta 50 mg daily initiated on 5/2/2018. The goal is to achieve and maintain a platelet count equal to or greater than 50,000, to reduce the risk of bleeding. Promacta will be initiated at 50 mg daily. The Promacta, dose can be increased after 2 weeks of dosing with a max dosing of 75 mg daily. Promacta increased to 75 mg daily on 10/3/2018 for a platelet count of 63,891.   Promacta decreased to 50 mg daily on 6/20/2019 for platelet count being > 200,000, platelets 398,316. History of lupus: [place] is followed by Dr. Noelle Clemente in [place]. She denies any significant joint pain, fatigue, febrile illness, or skin lesions, these symptoms are common with lupus. She reports a hiostory of hair loss and this symptom can be associated with lupus. She denies any recent flareups or lab testing related to her lupus diagnosis. [place] was evaluated by Dr. Donny Mattson on 5/26/2017 and he is suggesting that [place] has a differential diagnosis of Sjogern's. Serum studies on 4/27/2017  ds- DNA antibody 1   Aguilar antibodies <0.2   Sjogren's antibodies (SSA) > 8.0 (H)   Sjogren's antibodies (SSB) 5.8 (H)   Antichromatin antibodies <0.2   C4 20   Centromere B antibody <0.2    Bone marrow aspiration biopsy on 4/17/2018 documented low normal cellular bone marrow for age (~20-30%) with trilineage hematopoiesis and a blast count of ~1%. Many small lymphoid aggregates with mixed CD3+ T and CD20+ B-cells seen on clot section (likely representing underlying autoimmune disease). No morphological or cytogenic (FISH) evidence of a myelodysplastic syndrome. No diagnostic evidence of lymphoroliferative disorder, granulomas or metastatic malignancy. Normal female karyotype. Thrombocytopenia along with adequate number of megakaryocytes is consistent with peripheral destruction/sequestration of platelets. Dr. Christophe Bryant has recommended initiation of Promacta. The goal is to achieve and maintain a platelet count equal to or greater than 50,000, to reduce the risk of bleeding. Promacta will be initiated at 50 mg daily. The Promacta, dose can be increased after 2 weeks of dosing with a max dosing of 75 mg daily. [place] is knowledgeable of the possible side effects to include but not limited to fatigue, headache, insomnia, pruritus, anemia, and hepatotoxicity.     Past Medical History:    Past Medical History:   Diagnosis Date    Arrhythmia     CAD (coronary artery disease)     Hyperlipidemia     Hypertension     Immune thrombocytopenic purpura ([facility] 7/26/2019    Sleep apnea     Systemic lupus erythematosus ([facility] 7/26/2019    Thrombocytopenia, unspecified ([facility] 7/26/2019       Past Surgical History:    Past Surgical History:   Procedure Laterality Date    APPENDECTOMY      BREAST SURGERY      FOOT SURGERY      HYSTERECTOMY      TUBAL LIGATION         Current Medications:    Current Outpatient Medications   Medication Sig Dispense Refill    ferrous sulfate (IRON 325) 325 (65 Fe) MG tablet Take 1 tablet by mouth 2 times daily 60 tablet 5    eltrombopag (PROMACTA) 12.5 MG TABS tablet Take 1 tablet by mouth daily 30 tablet 11    metoprolol succinate (TOPROL XL) 50 MG extended release tablet Take 50 mg by mouth daily      lisinopril-hydrochlorothiazide (PRINZIDE;ZESTORETIC) 20-25 MG per tablet Take 1 tablet by mouth daily      hydroxychloroquine (PLAQUENIL) 200 MG tablet Take by mouth daily       No current facility-administered medications for this visit. Allergies: Allergies   Allergen Reactions    Ondansetron Hcl     Oxycodone-Aspirin Other (See Comments)       Social History:    Social History     Tobacco Use    Smoking status: Never Smoker    Smokeless tobacco: Never Used   Substance Use Topics    Alcohol use: Not Currently    Drug use: Never       Family History:   Family History   Problem Relation Age of Onset    Heart Disease Mother     Cancer Sister     Cancer Brother        Vitals:  Vitals:    04/01/21 1041   BP: 130/60   Pulse: 78   Temp: 96.9 °F (36.1 °C)   SpO2: 98%   Height: 5' (1.524 m)        Subjective   REVIEW OF SYSTEMS:   Review of Systems   Constitutional: Positive for fatigue. Negative for chills, diaphoresis and fever. HENT: Negative. Negative for congestion, ear pain, hearing loss, nosebleeds, sore throat and tinnitus. Eyes: Negative. Negative for pain, discharge and redness. no mass. Tenderness: There is no abdominal tenderness. There is no guarding or rebound. Hernia: No hernia is present. Musculoskeletal:         General: No tenderness or deformity. Comments: Range of motion within normal limits x4 extremities   Skin:     General: Skin is warm. Coloration: Skin is not pale. Findings: No erythema or rash. Neurological:      Mental Status: She is alert and oriented to person, place, and time. Cranial Nerves: No cranial nerve deficit. Coordination: Coordination normal.   Psychiatric:         Behavior: Behavior normal.         Thought Content: Thought content normal.         Labs:  Lab Results   Component Value Date    WBC 4.54 04/01/2021    HGB 10.5 (L) 04/01/2021    HCT 31.1 (L) 04/01/2021    MCV 91.2 04/01/2021     04/01/2021     Lab Results   Component Value Date    NEUTROABS 2.97 04/01/2021     ASSESSMENT/PLAN:      1. Chronic ITP (idiopathic thrombocytopenic purpura), continues to have an excellent response with Promacta. Continues to take Promacta 12.5 mg daily since January 2020 and has maintained a platelet count >664,155. If platelet count were to become greater than 400,000 would consider discontinuing Promacta at that time. Platelet count of 674,878 today. She reports tolerating the Promacta without significant difficulty other than occasional nausea. -Continue Promacta 12.5 mg daily    2. Iron deficiency anemia secondary to inadequate dietary iron intake. Hemoglobin stable at 10.5 with an MCV of 91.2. Intolerant to ferrous sulfate 325 mg twice daily due to nausea, currently taking once daily and tolerating well. Iron substrates were last evaluated on 9/2/2020 that documented a iron of 82, iron saturation 21%, TIBC 400 and a ferritin of 48.4. Rohit Ángel -Continue ferrous sulfate 325 mg p.o. daily  -Repeat iron substrates today    3.   Chronic fatigue, grade 1, stable with no change from previous exam.    I discussed all of the above findings included in the assessment and plan with the patient and the patient is in agreement to move forward with current recommendations/treatment. I have addressed all of their questions and concerns that were verbalized. FOLLOW UP:   1. Follow-up appointment given for 2 months, sooner if needed  2. Continue to follow with other medical providers as recommended    Discussed precautions related to [address] and being at increased risk. Discussed proper handwashing to be done frequently, limit exposure to other individuals and maintain social distancing of 6 feet. Recommend contacting primary care provider if having respiratory symptoms for further recommendations and consideration for testing.     (Please note that portions of this note were completed with a voice recognition program. Efforts were made to edit the dictations but occasionally words are mis-transcribed,  Also, portions of this note have been copied forward, however, changed to reflect the most current clinical status of this patient.)    Electronically signed by BETTINA Charles on 4/21/2021 at 9:07 AM

## 2021-05-26 DIAGNOSIS — D69.3 CHRONIC ITP (IDIOPATHIC THROMBOCYTOPENIC PURPURA) (HCC): Primary | ICD-10-CM

## 2021-05-28 ASSESSMENT — ENCOUNTER SYMPTOMS
EYE PAIN: 0
BACK PAIN: 0
BLOOD IN STOOL: 0
RESPIRATORY NEGATIVE: 1
SORE THROAT: 0
NAUSEA: 0
DIARRHEA: 0
VOMITING: 0
WHEEZING: 0
COUGH: 0
ABDOMINAL PAIN: 0
EYE REDNESS: 0
SHORTNESS OF BREATH: 0
GASTROINTESTINAL NEGATIVE: 1
EYES NEGATIVE: 1
EYE DISCHARGE: 0
CONSTIPATION: 0

## 2021-05-28 NOTE — PROGRESS NOTES
Progress Note      Pt Name: Yoana Jacobo  YOB: 1945  MRN: [id number]    Date of evaluation: 6/1/2021  History Obtained From:  patient, electronic medical record    CHIEF COMPLAINT:    Chief Complaint   Patient presents with    Follow-up     Chronic ITP (idiopathic thrombocytopenic purpura)     Anemia     HISTORY OF PRESENT ILLNESS:    Yoana Jacobo is a 78 y.o.  female with significant PMH of suspected ITP and anemia. Jamar Le was initiated on Promacta in May 2018 and has had an excellent response to treatment. Her Promacta dose has been decreased to 12.5 mg daily, previously receiving max dose of 75 mg daily. In relation to her anemia she takes ferrous sulfate. Jamar Le returns today in scheduled follow-up for evaluation, side effect monitoring, lab monitoring and further treatment recommendations. She presents today with no new complaints other than her chronic fatigue. She reports she continues to tolerate the Promacta without significant difficulty and is also taking the oral iron daily without difficulty. CBC today reveals a stable hemoglobin of 10.9 and a platelet count of 390,837. She will continue taking her Promacta 12.5 mg daily. Iron substrates were evaluated at previous appointment on 4/1/2021 that showed an excellent response to the oral iron replacement. Serology studies 4/1/2021  · Iron 72   · TIBC 400  · Iron saturation 18%  · Ferritin 76.4  · Folate 6.7  · Vitamin B-56=303  · Reticulocytes 1.3%  ·     Up-to-Date  Promacta: Information obtained from     HEMATOLOGY HISTORY:   Diagnosis: Thrombocytopenia with differential diagnosis of ITP    Sjogrens  Iron deficiency anemia    Treatment summary:  Initiation of prednisone at 50 mg daily on 4/27/2017 and completed on 6/22/2017 after weekly dose titration of 10 mg weekly. Prednisone 15 mg daily ×14 days, completed on 11/18/2017 for planned colonoscopy for positive occult stools. 5/2/2018 -initiation of Promacta 50 mg by mouth daily. Dose increased to 75 mg daily on 10/3/2018.   6/21/2019- decreased Promacta dosing to 50 mg   11/25/2019-decrease Promacta to 25 mg daily  1/21/2020-decrease Promacta 12.5 mg daily  7/15/2020-ferrous sulfate 325 mg 1 tablet p.o. x7 days then increase to twice daily if tolerated, intolerant to twice daily due to nausea. Tolerating once daily. HEMATOLOGY HISTORY:  [place] was seen in initial hematology consultation on 4/27/2017 for new onset of thrombocytopenia and anemia in referral from Dr. Fernanda Stringer. [place] has a history of lupus and a history of acute blood loss secondary to a vaginal hysterectomy with anterior posterior vaginal repair, and cystoscopy on 12/14/2016. She is followed by Dr. Tali Mera in [place] for her lupus and denies any significant flareups. She denies any previous history of thrombocytopenia or bleeding tendencies. Review of her present medication list did not reveal any medications that cause significant thrombocytopenia and she denies drinking any tonic water. CBC HISTORY:  12/19/2016 - WBC 8.36, hemoglobin 7.4, hematocrit 20.5, MCV 79.2 and a platelet count of 682,968.   12/20/2016 - WBC 4.67, neutrophils 80.9%, hemoglobin 10.2, hematocrit 29, MCV 82.6 and a platelet count of 586,002.   12/22/2016 - WBC 6.4, neutrophils 70.8%, hemoglobin 10, hematocrit 29.8, MCV 84.7, and a platelet count 674,727.   12/28/2016 - WBC 6.6, neutrophils 66.2%, hemoglobin 12.3, hematocrit 37.1, MCV 86, and a platelet count of 80,738.   4/17/2017 4/27/2017- WBC 5.17, neutrophils 59%, hemoglobin 10.3, hematocrit 33.3, MCV 92.5 and a platelet count of 03,640. Normocytic, normochromic anemia- with a history of acute blood loss (12/2016). Hemoglobin has stabilized.   Serum studies on 4/27/2017:  Iron 50   TIBC 276   Iron saturation 18%   Ferritin 466 (H)   Vitamin B12 426   Folate 17.9   Haptoglobin 116   Reticulocyte percent 1.3    Repeat serology studies on 9/21/2017:  Iron 66   TIBC 283   Iron saturation 23%   Ferritin 263   Vitamin B12 448   Folate 11.9   Referral potent 9.0   Reticulocyte count 1.2   IgG 625   IgA 204   IgM 204    10/6/2017 occult stool positive ×1. Colonoscopy on 11/17/2017 by Dr. Cali Alvarez revealed diverticulosis in the sigmoid and descending colon. Localized mild inflammation was found in the descending colon secondary to ischemic colitis. Serology studies on 8/13/2018:  Iron 163   TIBC 343   Iron saturation 48%   Ferritin 269   Vitamin B12 319   Folate 6.0   Erythropoietin 8.3   Reticulocyte count 1%   Creatinine 1.06   GFR 53       Serology studies on 9/24/2019:  · Iron 94  · TIBC 362  · Iron saturation 26%  · Ferritin 52.5  · Folate 5.52  · Vitamin B12 270      Serology studies on 6/3/2020:  · iron of 80  · TIBC 419  · Iron saturation 19%  · Ferritin 33.2  · Reticulocyte count of 1.5%  · Creatinine 1.0/GFR 54    Serology studies on 9/2/2020:  · Ferritin 48.4  · Iron 82  · Iron saturation 21%  · TIBC 400  · Creatinine 1.0/GFR 54    Thrombocytopenia- no known identifiable cause of onset, differential diagnosis of ITP. Review of medications did not reveal any known medications that induce thrombocytopenia. [place] denied any bleeding tendencies or significant ecchymosis. Serum studies on 4/27/2017:     Anticardiolipin IgA and IgG <9, IgM 12   PTT 24, PT 9.7, and INR 0.9   Rheumatoid factor 20.8 (H)   MANUEL positive   CRP <0.3   ESR 4   hepatitis C antibody <0.1   HIV negative   M spike not observed     Ultrasound of the spleen on 4/28/2017 revealed no abnormalities and spleen measures 8.8 x 3.2 x 3.1 cm. Promacta 50 mg daily initiated on 5/2/2018. The goal is to achieve and maintain a platelet count equal to or greater than 50,000, to reduce the risk of bleeding. Promacta will be initiated at 50 mg daily. The Promacta, dose can be increased after 2 weeks of dosing with a max dosing of 75 mg daily.   Promacta anemia, and hepatotoxicity. Serology studies 2021  · Iron 72   · TIBC 400  · Iron saturation 18%  · Ferritin 76.4  · Folate 6.7  · Vitamin B-75=356  · Reticulocytes 1.3%  ·     Age-appropriate health screenin2017 Colonoscopy at [facility] per Dr. Marquez Car for positive guiac revealed diverticulosis in the sigmoid and descending colon. Localized mild inflammation was found in the descending colon secondary to ischemic colitis. 2019 Bilateral mammogram at [address] [place] [address] documented there are scattered parenchymal densities, pattern B. There are postbiopsy changes in the left lateral breast, with stable lobularity of the breast parenchyma. No dominant mass or architectural distortion is identified. There are no suspicious calcifications, skin thickening or nipple retraction. There is mild inversion of the nipples laterally as before. Vascular calcification is are present. There is no significant interval change. Stable mammograms, no suspicious features are identified. No bone mineral density on file.     Past Medical History:    Past Medical History:   Diagnosis Date    Arrhythmia     CAD (coronary artery disease)     Hyperlipidemia     Hypertension     Immune thrombocytopenic purpura ([facility] [address] 2019    Sleep apnea     Systemic lupus erythematosus ([facility] [address] 2019    Thrombocytopenia, unspecified ([facility] [address] 2019       Past Surgical History:    Past Surgical History:   Procedure Laterality Date    APPENDECTOMY      BREAST SURGERY      FOOT SURGERY      HYSTERECTOMY      TUBAL LIGATION         Current Medications:    Current Outpatient Medications   Medication Sig Dispense Refill    ferrous sulfate (IRON 325) 325 (65 Fe) MG tablet Take 1 tablet by mouth 2 times daily 60 tablet 5    eltrombopag (PROMACTA) 12.5 MG TABS tablet Take 1 tablet by mouth daily 30 tablet 11    metoprolol succinate (TOPROL XL) 50 MG extended release tablet Take 50 mg by mouth daily      lisinopril-hydrochlorothiazide (PRINZIDE;ZESTORETIC) 20-25 MG per tablet Take 1 tablet by mouth daily      hydroxychloroquine (PLAQUENIL) 200 MG tablet Take by mouth daily       No current facility-administered medications for this visit. Allergies: Allergies   Allergen Reactions    Ondansetron Hcl     Oxycodone-Aspirin Other (See Comments)       Social History:    Social History     Tobacco Use    Smoking status: Never Smoker    Smokeless tobacco: Never Used   Substance Use Topics    Alcohol use: Not Currently    Drug use: Never       Family History:   Family History   Problem Relation Age of Onset    Heart Disease Mother     Cancer Sister     Cancer Brother        Vitals:  Vitals:    06/01/21 1132   BP: 126/80   Pulse: 73   SpO2: 99%   Weight: 123 lb 9.6 oz (56.1 kg)   Height: 5' (1.524 m)        Subjective   REVIEW OF SYSTEMS:   Review of Systems   Constitutional: Negative. Negative for chills, diaphoresis and fever. HENT: Negative. Negative for congestion, ear pain, hearing loss, nosebleeds, sore throat and tinnitus. Eyes: Negative. Negative for pain, discharge and redness. Respiratory: Negative. Negative for cough, shortness of breath and wheezing. Cardiovascular: Negative. Negative for chest pain, palpitations and leg swelling. Gastrointestinal: Negative. Negative for abdominal pain, blood in stool, constipation, diarrhea, nausea and vomiting. Endocrine: Negative for polydipsia. Genitourinary: Negative for dysuria, flank pain, frequency, hematuria and urgency. Musculoskeletal: Negative. Negative for back pain, myalgias and neck pain. Skin: Negative. Negative for rash. Neurological: Negative. Negative for dizziness, tremors, seizures, weakness and headaches. Hematological: Does not bruise/bleed easily. Psychiatric/Behavioral: Negative. The patient is not nervous/anxious. Objective   PHYSICAL EXAM:  Physical Exam  Vitals reviewed.    Constitutional: General: She is not in acute distress. Appearance: She is well-developed. She is not diaphoretic. HENT:      Head: Normocephalic and atraumatic. Mouth/Throat:      Pharynx: Uvula midline. Tonsils: No tonsillar exudate. Eyes:      General: Lids are normal. No scleral icterus. Right eye: No discharge. Left eye: No discharge. Conjunctiva/sclera: Conjunctivae normal.      Pupils: Pupils are equal, round, and reactive to light. Neck:      Thyroid: No thyroid mass or thyromegaly. Vascular: No JVD. Trachea: Trachea normal. No tracheal deviation. Cardiovascular:      Rate and Rhythm: Normal rate and regular rhythm. Heart sounds: Normal heart sounds. No murmur heard. No friction rub. No gallop. Pulmonary:      Effort: Pulmonary effort is normal. No respiratory distress. Breath sounds: Normal breath sounds. No wheezing or rales. Chest:      Chest wall: No tenderness. Abdominal:      General: Bowel sounds are normal. There is no distension. Palpations: Abdomen is soft. There is no mass. Tenderness: There is no abdominal tenderness. There is no guarding or rebound. Hernia: No hernia is present. Musculoskeletal:         General: No tenderness or deformity. Cervical back: Normal range of motion and neck supple. Comments: Range of motion within normal limits x4 extremities   Skin:     General: Skin is warm. Coloration: Skin is not pale. Findings: No erythema or rash. Neurological:      Mental Status: She is alert and oriented to person, place, and time. Cranial Nerves: No cranial nerve deficit. Coordination: Coordination normal.   Psychiatric:         Behavior: Behavior normal.         Thought Content:  Thought content normal.         Labs:  Lab Results   Component Value Date    WBC 5.23 06/01/2021    HGB 10.9 (L) 06/01/2021    HCT 33.2 (L) 06/01/2021    MCV 92.2 06/01/2021     06/01/2021     Lab Results   Component Value Date    NEUTROABS 3.36 06/01/2021       ASSESSMENT/PLAN:      1. Chronic ITP (idiopathic thrombocytopenic purpura), continues to have an excellent response with Promacta. Continues to take Promacta 12.5 mg daily since January 2020 and has maintained a platelet count >362,541. If platelet count were to become greater than 400,000 would consider discontinuing Promacta at that time. Platelet count of 243,537 today. She reports tolerating the Promacta without difficulty other than occasional nausea. -Continue Promacta 12.5 mg daily    2. Iron deficiency anemia secondary to inadequate dietary iron intake. Hemoglobin stable at 10.9 with an MCV of 92.2. Intolerant to ferrous sulfate 325 mg twice daily due to nausea, currently taking once daily and continues to tolerate well. Serology studies 4/1/2021  · Iron 72   · TIBC 400  · Iron saturation 18%  · Ferritin 76.4  · Folate 6.7  · Vitamin B-17=398  · Reticulocytes 1.3%  ·     -Continue ferrous sulfate 325 mg p.o. daily    3. Chronic fatigue, grade 1, stable with no change from previous evaluation    I discussed all of the above findings included in the assessment and plan with the patient and the patient is in agreement to move forward with current recommendations/treatment. I have addressed all of their questions and concerns that were verbalized. FOLLOW UP:   1. Follow-up appointment given for 2 months, sooner if needed  2. Continue to follow with other medical providers as recommended    Discussed precautions related to [address] and being at increased risk. Discussed proper handwashing to be done frequently, limit exposure to other individuals and maintain social distancing of 6 feet. Recommend contacting primary care provider if having respiratory symptoms for further recommendations and consideration for testing.     (Please note that portions of this note were completed with a voice recognition program. Efforts were made to edit the dictations but occasionally words are mis-transcribed,  Also, portions of this note have been copied forward, however, changed to reflect the most current clinical status of this patient.)    Tabby Blake am scribing for Georga Libman, APRN. Electronically signed by Regina Figueroa RN on 5/28/2021 at 1600. I, BETTINA Millan Sa personally performed the services described in this documentation as scribed by Regina Figueroa RN in my presence and is both accurate and complete.     Electronically signed by Georga Libman, APRN on 6/16/2021 at 7:04 PM

## 2021-06-01 ENCOUNTER — HOSPITAL ENCOUNTER (OUTPATIENT)
Dept: INFUSION THERAPY | Age: 76
Discharge: HOME | End: 2021-06-01
Payer: MEDICARE

## 2021-06-01 ENCOUNTER — OFFICE VISIT (OUTPATIENT)
Dept: HEMATOLOGY | Age: 76
End: 2021-06-01
Payer: MEDICARE

## 2021-06-01 VITALS
HEART RATE: 73 BPM | DIASTOLIC BLOOD PRESSURE: 80 MMHG | OXYGEN SATURATION: 99 % | WEIGHT: 123.6 LBS | SYSTOLIC BLOOD PRESSURE: 126 MMHG | HEIGHT: 60 IN | BODY MASS INDEX: 24.26 KG/M2

## 2021-06-01 DIAGNOSIS — D69.3 CHRONIC ITP (IDIOPATHIC THROMBOCYTOPENIC PURPURA) (HCC): ICD-10-CM

## 2021-06-01 DIAGNOSIS — R53.82 CHRONIC FATIGUE: ICD-10-CM

## 2021-06-01 DIAGNOSIS — D50.8 IRON DEFICIENCY ANEMIA SECONDARY TO INADEQUATE DIETARY IRON INTAKE: ICD-10-CM

## 2021-06-01 DIAGNOSIS — D69.3 CHRONIC ITP (IDIOPATHIC THROMBOCYTOPENIC PURPURA) (HCC): Primary | ICD-10-CM

## 2021-06-01 LAB
BASOPHILS ABSOLUTE: 0.03 K/UL (ref 0.01–0.08)
BASOPHILS RELATIVE PERCENT: 0.6 % (ref 0.1–1.2)
EOSINOPHILS ABSOLUTE: 0.15 K/UL (ref 0.04–0.54)
EOSINOPHILS RELATIVE PERCENT: 2.9 % (ref 0.7–7)
HCT VFR BLD CALC: 33.2 % (ref 34.1–44.9)
HEMOGLOBIN: 10.9 G/DL (ref 11.2–15.7)
LYMPHOCYTES ABSOLUTE: 1.18 K/UL (ref 1.18–3.74)
LYMPHOCYTES RELATIVE PERCENT: 22.6 % (ref 19.3–53.1)
MCH RBC QN AUTO: 30.3 PG (ref 25.6–32.2)
MCHC RBC AUTO-ENTMCNC: 32.8 G/DL (ref 32.3–35.5)
MCV RBC AUTO: 92.2 FL (ref 79.4–94.8)
MONOCYTES ABSOLUTE: 0.51 K/UL (ref 0.24–0.82)
MONOCYTES RELATIVE PERCENT: 9.8 % (ref 4.7–12.5)
NEUTROPHILS ABSOLUTE: 3.36 K/UL (ref 1.56–6.13)
NEUTROPHILS RELATIVE PERCENT: 64.1 % (ref 34–71.1)
PDW BLD-RTO: 12.2 % (ref 11.7–14.4)
PLATELET # BLD: 201 K/UL (ref 182–369)
PMV BLD AUTO: 10.6 FL (ref 7.4–10.4)
RBC # BLD: 3.6 M/UL (ref 3.93–5.22)
WBC # BLD: 5.23 K/UL (ref 3.98–10.04)

## 2021-06-01 PROCEDURE — G8400 PT W/DXA NO RESULTS DOC: HCPCS | Performed by: NURSE PRACTITIONER

## 2021-06-01 PROCEDURE — 1036F TOBACCO NON-USER: CPT | Performed by: NURSE PRACTITIONER

## 2021-06-01 PROCEDURE — 1090F PRES/ABSN URINE INCON ASSESS: CPT | Performed by: NURSE PRACTITIONER

## 2021-06-01 PROCEDURE — 3017F COLORECTAL CA SCREEN DOC REV: CPT | Performed by: NURSE PRACTITIONER

## 2021-06-01 PROCEDURE — 99214 OFFICE O/P EST MOD 30 MIN: CPT | Performed by: NURSE PRACTITIONER

## 2021-06-01 PROCEDURE — 85025 COMPLETE CBC W/AUTO DIFF WBC: CPT

## 2021-06-01 PROCEDURE — 1123F ACP DISCUSS/DSCN MKR DOCD: CPT | Performed by: NURSE PRACTITIONER

## 2021-06-01 PROCEDURE — 4040F PNEUMOC VAC/ADMIN/RCVD: CPT | Performed by: NURSE PRACTITIONER

## 2021-06-01 PROCEDURE — G8427 DOCREV CUR MEDS BY ELIG CLIN: HCPCS | Performed by: NURSE PRACTITIONER

## 2021-06-01 PROCEDURE — 99211 OFF/OP EST MAY X REQ PHY/QHP: CPT

## 2021-06-01 PROCEDURE — G8420 CALC BMI NORM PARAMETERS: HCPCS | Performed by: NURSE PRACTITIONER

## 2021-07-30 ASSESSMENT — ENCOUNTER SYMPTOMS
RESPIRATORY NEGATIVE: 1
COUGH: 0
VOMITING: 0
BACK PAIN: 0
CONSTIPATION: 0
EYE REDNESS: 0
ABDOMINAL PAIN: 0
DIARRHEA: 0
WHEEZING: 0
EYES NEGATIVE: 1
SORE THROAT: 0
SHORTNESS OF BREATH: 0
EYE DISCHARGE: 0
BLOOD IN STOOL: 0
EYE PAIN: 0

## 2021-07-30 NOTE — PROGRESS NOTES
Progress Note      Pt Name: Mayra Davis  YOB: 1945  MRN: [id number]    Date of evaluation: 7/30/2021  History Obtained From:  patient, electronic medical record    CHIEF COMPLAINT:    Chief Complaint   Patient presents with    Follow-up     Chronic ITP (idiopathic thrombocytopenic purpura) ([facility] [address]    Anemia     HISTORY OF PRESENT ILLNESS:    Mayra Davis is a 78 y.o.  female with significant PMH of suspected ITP and anemia. [place] was initiated on Promacta in May 2018 and has had an excellent response to treatment. Her Promacta dose has been decreased to 12.5 mg daily, previously receiving max dose of 75 mg daily. In relation to her anemia she takes ferrous sulfate. [place] returns today in scheduled follow-up for evaluation, side effect monitoring, lab monitoring further treatment recommendations. She presents today with no new complaints and indicates that overall she is doing well. She reports being compliant with her Promacta 12.5 mg and her ferrous sulfate 325 mg once daily, tolerating both without difficulty. CBC today reveals a stable hemoglobin of 10.7 and a platelet count of 299,570. She will continue taking her Promacta 12.5 mg daily. Iron substrates were evaluated at previous appointment on 4/1/2021 that showed an excellent response to the oral iron replacement. Up-to-Date  Promacta: Information obtained from     HEMATOLOGY HISTORY:   Diagnosis: Thrombocytopenia with differential diagnosis of ITP    Sjogrens  Iron deficiency anemia    Treatment summary:  Initiation of prednisone at 50 mg daily on 4/27/2017 and completed on 6/22/2017 after weekly dose titration of 10 mg weekly. Prednisone 15 mg daily ×14 days, completed on 11/18/2017 for planned colonoscopy for positive occult stools. 5/2/2018 -initiation of Promacta 50 mg by mouth daily.  Dose increased to 75 mg daily on 10/3/2018.   6/21/2019- decreased Promacta dosing to 50 mg   11/25/2019-decrease Promacta to 25 mg daily  1/21/2020-decrease Promacta 12.5 mg daily  7/15/2020-ferrous sulfate 325 mg 1 tablet p.o. x7 days then increase to twice daily if tolerated, intolerant to twice daily due to nausea. Tolerating once daily. HEMATOLOGY HISTORY:  [place] was seen in initial hematology consultation on 4/27/2017 for new onset of thrombocytopenia and anemia in referral from Dr. Shirin Montgomery. [place] has a history of lupus and a history of acute blood loss secondary to a vaginal hysterectomy with anterior posterior vaginal repair, and cystoscopy on 12/14/2016. She is followed by Dr. Thu Gould in [place] for her lupus and denies any significant flareups. She denies any previous history of thrombocytopenia or bleeding tendencies. Review of her present medication list did not reveal any medications that cause significant thrombocytopenia and she denies drinking any tonic water. CBC HISTORY:  12/19/2016 - WBC 8.36, hemoglobin 7.4, hematocrit 20.5, MCV 79.2 and a platelet count of 427,805.   12/20/2016 - WBC 4.67, neutrophils 80.9%, hemoglobin 10.2, hematocrit 29, MCV 82.6 and a platelet count of 768,468.   12/22/2016 - WBC 6.4, neutrophils 70.8%, hemoglobin 10, hematocrit 29.8, MCV 84.7, and a platelet count 627,703.   12/28/2016 - WBC 6.6, neutrophils 66.2%, hemoglobin 12.3, hematocrit 37.1, MCV 86, and a platelet count of 37,765.   4/17/2017 4/27/2017- WBC 5.17, neutrophils 59%, hemoglobin 10.3, hematocrit 33.3, MCV 92.5 and a platelet count of 37,116. Normocytic, normochromic anemia- with a history of acute blood loss (12/2016). Hemoglobin has stabilized.   Serum studies on 4/27/2017:  Iron 50   TIBC 276   Iron saturation 18%   Ferritin 466 (H)   Vitamin B12 426   Folate 17.9   Haptoglobin 116   Reticulocyte percent 1.3    Repeat serology studies on 9/21/2017:  Iron 66   TIBC 283   Iron saturation 23%   Ferritin 263   Vitamin B12 448   Folate 11.9   Referral potent 9.0   Reticulocyte count 1.2   IgG 625   IgA 204   IgM 204    10/6/2017 occult stool positive ×1. Colonoscopy on 11/17/2017 by Dr. Vincent Bailey revealed diverticulosis in the sigmoid and descending colon. Localized mild inflammation was found in the descending colon secondary to ischemic colitis. Serology studies on 8/13/2018:  Iron 163   TIBC 343   Iron saturation 48%   Ferritin 269   Vitamin B12 319   Folate 6.0   Erythropoietin 8.3   Reticulocyte count 1%   Creatinine 1.06   GFR 53       Serology studies on 9/24/2019:  · Iron 94  · TIBC 362  · Iron saturation 26%  · Ferritin 52.5  · Folate 5.52  · Vitamin B12 270      Serology studies on 6/3/2020:  · iron of 80  · TIBC 419  · Iron saturation 19%  · Ferritin 33.2  · Reticulocyte count of 1.5%  · Creatinine 1.0/GFR 54    Serology studies on 9/2/2020:  · Ferritin 48.4  · Iron 82  · Iron saturation 21%  · TIBC 400  · Creatinine 1.0/GFR 54    Thrombocytopenia- no known identifiable cause of onset, differential diagnosis of ITP. Review of medications did not reveal any known medications that induce thrombocytopenia. [place] denied any bleeding tendencies or significant ecchymosis. Serum studies on 4/27/2017:     Anticardiolipin IgA and IgG <9, IgM 12   PTT 24, PT 9.7, and INR 0.9   Rheumatoid factor 20.8 (H)   MANUEL positive   CRP <0.3   ESR 4   hepatitis C antibody <0.1   HIV negative   M spike not observed     Ultrasound of the spleen on 4/28/2017 revealed no abnormalities and spleen measures 8.8 x 3.2 x 3.1 cm. Promacta 50 mg daily initiated on 5/2/2018. The goal is to achieve and maintain a platelet count equal to or greater than 50,000, to reduce the risk of bleeding. Promacta will be initiated at 50 mg daily. The Promacta, dose can be increased after 2 weeks of dosing with a max dosing of 75 mg daily. Promacta increased to 75 mg daily on 10/3/2018 for a platelet count of 65,265.   Promacta decreased to 50 mg daily on 6/20/2019 for platelet count being > 200,000, platelets 587,663. History of lupus: [place] is followed by Dr. Arlette Lamb in [place]. She denies any significant joint pain, fatigue, febrile illness, or skin lesions, these symptoms are common with lupus. She reports a hiostory of hair loss and this symptom can be associated with lupus. She denies any recent flareups or lab testing related to her lupus diagnosis. [place] was evaluated by Dr. Bessie Bragg on 5/26/2017 and he is suggesting that [place] has a differential diagnosis of Sjogern's. Serum studies on 4/27/2017  ds- DNA antibody 1   Aguilar antibodies <0.2   Sjogren's antibodies (SSA) > 8.0 (H)   Sjogren's antibodies (SSB) 5.8 (H)   Antichromatin antibodies <0.2   C4 20   Centromere B antibody <0.2    Bone marrow aspiration biopsy on 4/17/2018 documented low normal cellular bone marrow for age (~20-30%) with trilineage hematopoiesis and a blast count of ~1%. Many small lymphoid aggregates with mixed CD3+ T and CD20+ B-cells seen on clot section (likely representing underlying autoimmune disease). No morphological or cytogenic (FISH) evidence of a myelodysplastic syndrome. No diagnostic evidence of lymphoroliferative disorder, granulomas or metastatic malignancy. Normal female karyotype. Thrombocytopenia along with adequate number of megakaryocytes is consistent with peripheral destruction/sequestration of platelets. Dr. Bruce Norman has recommended initiation of Promacta. The goal is to achieve and maintain a platelet count equal to or greater than 50,000, to reduce the risk of bleeding. Promacta will be initiated at 50 mg daily. The Promacta, dose can be increased after 2 weeks of dosing with a max dosing of 75 mg daily. [place] is knowledgeable of the possible side effects to include but not limited to fatigue, headache, insomnia, pruritus, anemia, and hepatotoxicity.     Serology studies 4/1/2021  · Iron 72   · TIBC 400  · Iron saturation 18%  · Ferritin 76.4  · Folate 6.7  · Vitamin B-31=221  · Reticulocytes 1.3%  ·     Age-appropriate health screenin2017 Colonoscopy at [facility] per Dr. Laure Schilder for positive guiac revealed diverticulosis in the sigmoid and descending colon. Localized mild inflammation was found in the descending colon secondary to ischemic colitis. 2019 Bilateral mamm[facility] at [facility] documented there are scattered parenchymal densities, pattern B. There are postbiopsy changes in the left lateral breast, with stable lobularity of the breast parenchyma. No dominant mass or architectural distortion is identified. There are no suspicious calcifications, skin thickening or nipple retraction. There is mild inversion of the nipples laterally as before. Vascular calcification is are present. There is no significant interval change. Stable mammograms, no suspicious features are identified. No bone mineral density on file.     Past Medical History:    Past Medical History:   Diagnosis Date    Arrhythmia     CAD (coronary artery disease)     Hyperlipidemia     Hypertension     Immune thrombocytopenic purpura ([facility] [address] 2019    Sleep apnea     Systemic lupus erythematosus ([facility] [address] 2019    Thrombocytopenia, unspecified ([facility] [address] 2019       Past Surgical History:    Past Surgical History:   Procedure Laterality Date    APPENDECTOMY      BREAST SURGERY      FOOT SURGERY      HYSTERECTOMY      TUBAL LIGATION         Current Medications:    Current Outpatient Medications   Medication Sig Dispense Refill    ferrous sulfate (IRON 325) 325 (65 Fe) MG tablet Take 1 tablet by mouth 2 times daily 60 tablet 5    eltrombopag (PROMACTA) 12.5 MG TABS tablet Take 1 tablet by mouth daily 30 tablet 11    metoprolol succinate (TOPROL XL) 50 MG extended release tablet Take 50 mg by mouth daily      lisinopril-hydrochlorothiazide (PRINZIDE;ZESTORETIC) 20-25 MG per tablet Take 1 tablet by mouth daily      hydroxychloroquine (PLAQUENIL) 200 MG tablet Take by mouth daily       No current facility-administered medications for this visit. Allergies: Allergies   Allergen Reactions    Ondansetron Hcl     Oxycodone-Aspirin Other (See Comments)       Social History:    Social History     Tobacco Use    Smoking status: Never Smoker    Smokeless tobacco: Never Used   Substance Use Topics    Alcohol use: Not Currently    Drug use: Never       Family History:   Family History   Problem Relation Age of Onset    Heart Disease Mother     Cancer Sister     Cancer Brother        Vitals:  Vitals:    08/02/21 1050   BP: (!) 112/58   Pulse: 61   SpO2: 98%   Weight: 111 lb 3.2 oz (50.4 kg)   Height: 5' (1.524 m)        Subjective   REVIEW OF SYSTEMS:   Review of Systems   Constitutional: Positive for fatigue (Chronic with no change). Negative for chills, diaphoresis and fever. HENT: Negative. Negative for congestion, ear pain, hearing loss, nosebleeds, sore throat and tinnitus. Eyes: Negative. Negative for pain, discharge and redness. Respiratory: Negative. Negative for cough, shortness of breath and wheezing. Cardiovascular: Negative. Negative for chest pain, palpitations and leg swelling. Gastrointestinal: Positive for nausea (Occasional and medication induced). Negative for abdominal pain, blood in stool, constipation, diarrhea and vomiting. Endocrine: Negative for polydipsia. Genitourinary: Negative for dysuria, flank pain, frequency, hematuria and urgency. Musculoskeletal: Negative. Negative for back pain, myalgias and neck pain. Skin: Negative. Negative for rash. Neurological: Negative. Negative for dizziness, tremors, seizures, weakness and headaches. Hematological: Does not bruise/bleed easily. Psychiatric/Behavioral: Negative. The patient is not nervous/anxious. Objective   PHYSICAL EXAM:  Physical Exam  Vitals reviewed. Constitutional:       General: She is not in acute distress. Appearance: She is well-developed. She is not diaphoretic. HENT:      Head: Normocephalic and atraumatic. Mouth/Throat:      Pharynx: Uvula midline. Tonsils: No tonsillar exudate. Eyes:      General: Lids are normal. No scleral icterus. Right eye: No discharge. Left eye: No discharge. Conjunctiva/sclera: Conjunctivae normal.      Pupils: Pupils are equal, round, and reactive to light. Neck:      Thyroid: No thyroid mass or thyromegaly. Vascular: No JVD. Trachea: Trachea normal. No tracheal deviation. Cardiovascular:      Rate and Rhythm: Normal rate and regular rhythm. Heart sounds: Normal heart sounds. No murmur heard. No friction rub. No gallop. Pulmonary:      Effort: Pulmonary effort is normal. No respiratory distress. Breath sounds: Normal breath sounds. No wheezing or rales. Chest:      Chest wall: No tenderness. Abdominal:      General: Bowel sounds are normal. There is no distension. Palpations: Abdomen is soft. There is no mass. Tenderness: There is no abdominal tenderness. There is no guarding or rebound. Hernia: No hernia is present. Musculoskeletal:         General: No tenderness or deformity. Cervical back: Normal range of motion and neck supple. Comments: Range of motion within normal limits x4 extremities   Skin:     General: Skin is warm. Coloration: Skin is not pale. Findings: No erythema or rash. Neurological:      Mental Status: She is alert and oriented to person, place, and time. Cranial Nerves: No cranial nerve deficit. Coordination: Coordination normal.   Psychiatric:         Behavior: Behavior normal.         Thought Content:  Thought content normal.         Labs:  Lab Results   Component Value Date    WBC 4.86 08/02/2021    HGB 10.7 (L) 08/02/2021    HCT 32.1 (L) 08/02/2021    MCV 90.7 08/02/2021     08/02/2021     Lab Results   Component Value Date    NEUTROABS 2.97 08/02/2021       ASSESSMENT/PLAN: 1. Chronic ITP (idiopathic thrombocytopenic purpura), continues to have an excellent response with Promacta. Continues to take Promacta 12.5 mg daily since January 2020 and has maintained a platelet count >593,361. If platelet count were to become greater than 400,000 would consider discontinuing Promacta at that time. Platelet count of 660,964 today. She reports tolerating the Promacta without difficulty other than occasional nausea. -Continue Promacta 12.5 mg daily    2. Iron deficiency anemia secondary to inadequate dietary iron intake. Stable hemoglobin of 10.7 and MCV 90.7. Iron substrates were evaluated at previous appointment on 4/1/2021 that showed an excellent response to the oral iron replacement. Intolerant to ferrous sulfate 325 mg twice daily due to nausea, currently taking once daily and continues to tolerate well. Iron substrates were evaluated at previous appointment on 4/1/2021 that showed an excellent response to the oral iron replacement.    -Continue ferrous sulfate 325 mg p.o. daily  -CMP, iron panel, and ferritin today    3. Chronic fatigue, grade 1, stable with no change from previous evaluation    I discussed all of the above findings included in the assessment and plan with the patient and the patient is in agreement to move forward with current recommendations/treatment. I have addressed all of their questions and concerns that were verbalized. FOLLOW UP:   1. CBC in 2 months, and follow-up appointment given for 4 months sooner if needed  2. Continue to follow with other medical providers as recommended    Discussed precautions related to [address] and being at increased risk. Discussed proper handwashing to be done frequently, limit exposure to other individuals and maintain social distancing of 6 feet. Recommend contacting primary care provider if having respiratory symptoms for further recommendations and consideration for testing.     (Please note that portions of this note were completed with a voice recognition program. Efforts were made to edit the dictations but occasionally words are mis-transcribed,  Also, portions of this note have been copied forward, however, changed to reflect the most current clinical status of this patient.)    IVandana, am scribing for BETTINA Redman. Electronically signed by Vandana Bynum RN on 7/30/2021 at 1400. IJose L APRN personally performed the services described in this documentation as scribed by Vandana Bynum RN in my presence and is both accurate and complete.     Electronically signed by BETTINA Redman on 8/22/2021 at 1:11 PM

## 2021-08-02 ENCOUNTER — HOSPITAL ENCOUNTER (OUTPATIENT)
Dept: INFUSION THERAPY | Age: 76
Discharge: HOME | End: 2021-08-02
Payer: MEDICARE

## 2021-08-02 ENCOUNTER — OFFICE VISIT (OUTPATIENT)
Dept: HEMATOLOGY | Age: 76
End: 2021-08-02
Payer: MEDICARE

## 2021-08-02 VITALS
OXYGEN SATURATION: 98 % | SYSTOLIC BLOOD PRESSURE: 112 MMHG | HEIGHT: 60 IN | DIASTOLIC BLOOD PRESSURE: 58 MMHG | BODY MASS INDEX: 21.83 KG/M2 | HEART RATE: 61 BPM | WEIGHT: 111.2 LBS

## 2021-08-02 DIAGNOSIS — D69.3 CHRONIC ITP (IDIOPATHIC THROMBOCYTOPENIC PURPURA) (HCC): Primary | ICD-10-CM

## 2021-08-02 DIAGNOSIS — R53.82 CHRONIC FATIGUE: ICD-10-CM

## 2021-08-02 DIAGNOSIS — D50.8 IRON DEFICIENCY ANEMIA SECONDARY TO INADEQUATE DIETARY IRON INTAKE: ICD-10-CM

## 2021-08-02 DIAGNOSIS — D69.3 CHRONIC ITP (IDIOPATHIC THROMBOCYTOPENIC PURPURA) (HCC): ICD-10-CM

## 2021-08-02 LAB
BASOPHILS ABSOLUTE: 0.03 K/UL (ref 0.01–0.08)
BASOPHILS RELATIVE PERCENT: 0.6 % (ref 0.1–1.2)
EOSINOPHILS ABSOLUTE: 0.18 K/UL (ref 0.04–0.54)
EOSINOPHILS RELATIVE PERCENT: 3.7 % (ref 0.7–7)
HCT VFR BLD CALC: 32.1 % (ref 34.1–44.9)
HEMOGLOBIN: 10.7 G/DL (ref 11.2–15.7)
LYMPHOCYTES ABSOLUTE: 1.12 K/UL (ref 1.18–3.74)
LYMPHOCYTES RELATIVE PERCENT: 23 % (ref 19.3–53.1)
MCH RBC QN AUTO: 30.2 PG (ref 25.6–32.2)
MCHC RBC AUTO-ENTMCNC: 33.3 G/DL (ref 32.3–35.5)
MCV RBC AUTO: 90.7 FL (ref 79.4–94.8)
MONOCYTES ABSOLUTE: 0.56 K/UL (ref 0.24–0.82)
MONOCYTES RELATIVE PERCENT: 11.5 % (ref 4.7–12.5)
NEUTROPHILS ABSOLUTE: 2.97 K/UL (ref 1.56–6.13)
NEUTROPHILS RELATIVE PERCENT: 61.2 % (ref 34–71.1)
PDW BLD-RTO: 12.2 % (ref 11.7–14.4)
PLATELET # BLD: 216 K/UL (ref 182–369)
PMV BLD AUTO: 10.5 FL (ref 7.4–10.4)
RBC # BLD: 3.54 M/UL (ref 3.93–5.22)
WBC # BLD: 4.86 K/UL (ref 3.98–10.04)

## 2021-08-02 PROCEDURE — 36415 COLL VENOUS BLD VENIPUNCTURE: CPT

## 2021-08-02 PROCEDURE — 1036F TOBACCO NON-USER: CPT | Performed by: NURSE PRACTITIONER

## 2021-08-02 PROCEDURE — G8400 PT W/DXA NO RESULTS DOC: HCPCS | Performed by: NURSE PRACTITIONER

## 2021-08-02 PROCEDURE — G8420 CALC BMI NORM PARAMETERS: HCPCS | Performed by: NURSE PRACTITIONER

## 2021-08-02 PROCEDURE — 85025 COMPLETE CBC W/AUTO DIFF WBC: CPT

## 2021-08-02 PROCEDURE — 1123F ACP DISCUSS/DSCN MKR DOCD: CPT | Performed by: NURSE PRACTITIONER

## 2021-08-02 PROCEDURE — 99212 OFFICE O/P EST SF 10 MIN: CPT

## 2021-08-02 PROCEDURE — 99214 OFFICE O/P EST MOD 30 MIN: CPT | Performed by: NURSE PRACTITIONER

## 2021-08-02 PROCEDURE — G8427 DOCREV CUR MEDS BY ELIG CLIN: HCPCS | Performed by: NURSE PRACTITIONER

## 2021-08-02 PROCEDURE — 4040F PNEUMOC VAC/ADMIN/RCVD: CPT | Performed by: NURSE PRACTITIONER

## 2021-08-02 PROCEDURE — 3017F COLORECTAL CA SCREEN DOC REV: CPT | Performed by: NURSE PRACTITIONER

## 2021-08-02 PROCEDURE — 1090F PRES/ABSN URINE INCON ASSESS: CPT | Performed by: NURSE PRACTITIONER

## 2021-08-22 ASSESSMENT — ENCOUNTER SYMPTOMS: NAUSEA: 1

## 2021-10-01 ENCOUNTER — APPOINTMENT (OUTPATIENT)
Dept: INFUSION THERAPY | Age: 76
End: 2021-10-01
Payer: MEDICARE

## 2021-10-04 ENCOUNTER — HOSPITAL ENCOUNTER (OUTPATIENT)
Dept: INFUSION THERAPY | Age: 76
Discharge: HOME | End: 2021-10-04
Payer: MEDICARE

## 2021-10-04 DIAGNOSIS — D69.3 CHRONIC ITP (IDIOPATHIC THROMBOCYTOPENIC PURPURA) (HCC): ICD-10-CM

## 2021-10-04 LAB
BASOPHILS ABSOLUTE: 0.05 K/UL (ref 0.01–0.08)
BASOPHILS RELATIVE PERCENT: 1.1 % (ref 0.1–1.2)
EOSINOPHILS ABSOLUTE: 0.12 K/UL (ref 0.04–0.54)
EOSINOPHILS RELATIVE PERCENT: 2.6 % (ref 0.7–7)
HCT VFR BLD CALC: 31.7 % (ref 34.1–44.9)
HEMOGLOBIN: 10.8 G/DL (ref 11.2–15.7)
LYMPHOCYTES ABSOLUTE: 0.93 K/UL (ref 1.18–3.74)
LYMPHOCYTES RELATIVE PERCENT: 20.2 % (ref 19.3–53.1)
MCH RBC QN AUTO: 31 PG (ref 25.6–32.2)
MCHC RBC AUTO-ENTMCNC: 34.1 G/DL (ref 32.3–35.5)
MCV RBC AUTO: 91.1 FL (ref 79.4–94.8)
MONOCYTES ABSOLUTE: 0.42 K/UL (ref 0.24–0.82)
MONOCYTES RELATIVE PERCENT: 9.1 % (ref 4.7–12.5)
NEUTROPHILS ABSOLUTE: 3.09 K/UL (ref 1.56–6.13)
NEUTROPHILS RELATIVE PERCENT: 67 % (ref 34–71.1)
PDW BLD-RTO: 12.6 % (ref 11.7–14.4)
PLATELET # BLD: 197 K/UL (ref 182–369)
PMV BLD AUTO: 10.5 FL (ref 7.4–10.4)
RBC # BLD: 3.48 M/UL (ref 3.93–5.22)
WBC # BLD: 4.61 K/UL (ref 3.98–10.04)

## 2021-10-04 PROCEDURE — 36415 COLL VENOUS BLD VENIPUNCTURE: CPT

## 2021-10-04 PROCEDURE — 85025 COMPLETE CBC W/AUTO DIFF WBC: CPT

## 2021-11-04 DIAGNOSIS — D50.8 IRON DEFICIENCY ANEMIA SECONDARY TO INADEQUATE DIETARY IRON INTAKE: ICD-10-CM

## 2021-11-04 RX ORDER — FERROUS SULFATE 325(65) MG
325 TABLET ORAL 2 TIMES DAILY
Qty: 60 TABLET | Refills: 5 | Status: SHIPPED | OUTPATIENT
Start: 2021-11-04

## 2021-12-01 ASSESSMENT — ENCOUNTER SYMPTOMS
ABDOMINAL PAIN: 0
SHORTNESS OF BREATH: 0
EYE REDNESS: 0
DIARRHEA: 0
VOMITING: 0
BACK PAIN: 0
EYE PAIN: 0
EYE DISCHARGE: 0
EYES NEGATIVE: 1
BLOOD IN STOOL: 0
SORE THROAT: 0
CONSTIPATION: 0
COUGH: 0
WHEEZING: 0
RESPIRATORY NEGATIVE: 1

## 2021-12-01 NOTE — PROGRESS NOTES
Progress Note      Pt Name: Beryl Moscoso  YOB: 1945  MRN: [id number]    Date of evaluation: 12/01/2021  History Obtained From:  patient, electronic medical record    CHIEF COMPLAINT:    Chief Complaint   Patient presents with    Follow-up     Chronic ITP (idiopathic thrombocytopenic purpura) ([facility] [address]    Anemia     HISTORY OF PRESENT ILLNESS:    Beryl Moscoso is a 78 y.o.  female with significant PMH of suspected ITP and anemia. [place] was initiated on Promacta in May 2018 and has had an excellent response to treatment. Her Promacta dose has been decreased to 12.5 mg daily, previously receiving max dose of 75 mg daily. In relation to her anemia she takes ferrous sulfate 325 mg p.o. daily. [place] returns today in scheduled follow-up for evaluation, side effect monitoring, lab monitoring further treatment recommendations. He presents today with no new complaints and indicates that overall she is doing well. She remains compliant with her Promacta and has a platelet count of 000,916. She is planning for oral surgery, from a hematology standpoint her CBC is appropriate. She confirms taking her ferrous sulfate 325 mg daily, intolerant to twice daily. Up-to-Date  Promacta: Information obtained from     HEMATOLOGY HISTORY:   Diagnosis: Thrombocytopenia with differential diagnosis of ITP    Sjogrens  Iron deficiency anemia    Treatment summary:  Initiation of prednisone at 50 mg daily on 4/27/2017 and completed on 6/22/2017 after weekly dose titration of 10 mg weekly. Prednisone 15 mg daily ×14 days, completed on 11/18/2017 for planned colonoscopy for positive occult stools. 5/2/2018 -initiation of Promacta 50 mg by mouth daily.  Dose increased to 75 mg daily on 10/3/2018.   6/21/2019- decreased Promacta dosing to 50 mg   11/25/2019-decrease Promacta to 25 mg daily  1/21/2020-decrease Promacta 12.5 mg daily  7/15/2020-ferrous sulfate 325 mg 1 tablet p.o. x7 days then increase to twice daily if tolerated, intolerant to twice daily due to nausea. Tolerating once daily. HEMATOLOGY HISTORY:  [place] was seen in initial hematology consultation on 4/27/2017 for new onset of thrombocytopenia and anemia in referral from Dr. Sulema Kincaid. [place] has a history of lupus and a history of acute blood loss secondary to a vaginal hysterectomy with anterior posterior vaginal repair, and cystoscopy on 12/14/2016. She is followed by Dr. Abbey Redding in [place] for her lupus and denies any significant flareups. She denies any previous history of thrombocytopenia or bleeding tendencies. Review of her present medication list did not reveal any medications that cause significant thrombocytopenia and she denies drinking any tonic water. CBC HISTORY:  12/19/2016 - WBC 8.36, hemoglobin 7.4, hematocrit 20.5, MCV 79.2 and a platelet count of 096,103.   12/20/2016 - WBC 4.67, neutrophils 80.9%, hemoglobin 10.2, hematocrit 29, MCV 82.6 and a platelet count of 243,680.   12/22/2016 - WBC 6.4, neutrophils 70.8%, hemoglobin 10, hematocrit 29.8, MCV 84.7, and a platelet count 949,004.   12/28/2016 - WBC 6.6, neutrophils 66.2%, hemoglobin 12.3, hematocrit 37.1, MCV 86, and a platelet count of 97,976.   4/27/2017- WBC 5.17, neutrophils 59%, hemoglobin 10.3, hematocrit 33.3, MCV 92.5 and a platelet count of 90,878. Normocytic, normochromic anemia- with a history of acute blood loss (12/2016). Hemoglobin has stabilized. Serum studies on 4/27/2017:  Iron 50   TIBC 276   Iron saturation 18%   Ferritin 466 (H)   Vitamin B12 426   Folate 17.9   Haptoglobin 116   Reticulocyte percent 1.3    Repeat serology studies on 9/21/2017:  Iron 66   TIBC 283   Iron saturation 23%   Ferritin 263   Vitamin B12 448   Folate 11.9   Referral potent 9.0   Reticulocyte count 1.2   IgG 625   IgA 204   IgM 204    10/6/2017 occult stool positive ×1.     Colonoscopy on 11/17/2017 by Dr. Tamera Conde revealed diverticulosis in the sigmoid and descending colon. Localized mild inflammation was found in the descending colon secondary to ischemic colitis. Serology studies on 8/13/2018:  Iron 163   TIBC 343   Iron saturation 48%   Ferritin 269   Vitamin B12 319   Folate 6.0   Erythropoietin 8.3   Reticulocyte count 1%   Creatinine 1.06   GFR 53       Serology studies on 9/24/2019:  · Iron 94  · TIBC 362  · Iron saturation 26%  · Ferritin 52.5  · Folate 5.52  · Vitamin B12 270      Serology studies on 6/3/2020:  · iron of 80  · TIBC 419  · Iron saturation 19%  · Ferritin 33.2  · Reticulocyte count of 1.5%  · Creatinine 1.0/GFR 54    Serology studies on 9/2/2020:  · Ferritin 48.4  · Iron 82  · Iron saturation 21%  · TIBC 400  · Creatinine 1.0/GFR 54    Thrombocytopenia- no known identifiable cause of onset, differential diagnosis of ITP. Review of medications did not reveal any known medications that induce thrombocytopenia. [place] denied any bleeding tendencies or significant ecchymosis. Serum studies on 4/27/2017:     Anticardiolipin IgA and IgG <9, IgM 12   PTT 24, PT 9.7, and INR 0.9   Rheumatoid factor 20.8 (H)   MANUEL positive   CRP <0.3   ESR 4   hepatitis C antibody <0.1   HIV negative   M spike not observed     Ultrasound of the spleen on 4/28/2017 revealed no abnormalities and spleen measures 8.8 x 3.2 x 3.1 cm. Promacta 50 mg daily initiated on 5/2/2018. The goal is to achieve and maintain a platelet count equal to or greater than 50,000, to reduce the risk of bleeding. Promacta will be initiated at 50 mg daily. The Promacta, dose can be increased after 2 weeks of dosing with a max dosing of 75 mg daily. Promacta increased to 75 mg daily on 10/3/2018 for a platelet count of 99,478. Promacta decreased to 50 mg daily on 6/20/2019 for platelet count being > 200,000, platelets 335,235. History of lupus: [place] is followed by Dr. Fern Jin in [place].  She denies any significant joint pain, fatigue, febrile illness, or skin lesions, these symptoms are common with lupus. She reports a hiostory of hair loss and this symptom can be associated with lupus. She denies any recent flareups or lab testing related to her lupus diagnosis. [place] was evaluated by Dr. Abelino Marina on 5/26/2017 and he is suggesting that [place] has a differential diagnosis of Sjogern's. Serum studies on 4/27/2017  ds- DNA antibody 1   Aguilar antibodies <0.2   Sjogren's antibodies (SSA) > 8.0 (H)   Sjogren's antibodies (SSB) 5.8 (H)   Antichromatin antibodies <0.2   C4 20   Centromere B antibody <0.2    Bone marrow aspiration biopsy on 4/17/2018 documented low normal cellular bone marrow for age (~20-30%) with trilineage hematopoiesis and a blast count of ~1%. Many small lymphoid aggregates with mixed CD3+ T and CD20+ B-cells seen on clot section (likely representing underlying autoimmune disease). No morphological or cytogenic (FISH) evidence of a myelodysplastic syndrome. No diagnostic evidence of lymphoroliferative disorder, granulomas or metastatic malignancy. Normal female karyotype. Thrombocytopenia along with adequate number of megakaryocytes is consistent with peripheral destruction/sequestration of platelets. Dr. Amber Tavares has recommended initiation of Promacta. The goal is to achieve and maintain a platelet count equal to or greater than 50,000, to reduce the risk of bleeding. Promacta will be initiated at 50 mg daily. The Promacta, dose can be increased after 2 weeks of dosing with a max dosing of 75 mg daily. [place] is knowledgeable of the possible side effects to include but not limited to fatigue, headache, insomnia, pruritus, anemia, and hepatotoxicity.     Serology studies 4/1/2021  · Iron 72   · TIBC 400  · Iron saturation 18%  · Ferritin 76.4  · Folate 6.7  · Vitamin B-69=469  · Reticulocytes 1.3%  ·     Serology studies 08/02/2021  · Iron 64  · TIBC 302  · Saturation 21%  · Ferritin 170    Age-appropriate health screenin2017 [facility] per Dr. Jorge Saez for positive guiac revealed diverticulosis in the sigmoid and descending colon. Localized mild inflammation was found in the descending colon secondary to ischemic colitis. [facility] there are scattered parenchymal densities, pattern B. There are postbiopsy changes in the left lateral breast, with stable lobularity of the breast parenchyma. No dominant mass or architectural distortion is identified. There are no suspicious calcifications, skin thickening or nipple retraction. There is mild inversion of the nipples laterally as before. Vascular calcification is are present. There is no significant interval change. Stable mammograms, no suspicious features are identified. No bone mineral density on file.     Past Medical History:    Past Medical History:   Diagnosis Date    Arrhythmia     CAD (coronary artery disease)     Hyperlipidemia     Hypertension     Immune thrombocytopenic purpura ([facility] [address] 2019    Sleep apnea     Systemic lupus erythematosus ([facility] [address] 2019    Thrombocytopenia, unspecified ([facility] [address] 2019       Past Surgical History:    Past Surgical History:   Procedure Laterality Date    APPENDECTOMY      BREAST SURGERY      FOOT SURGERY      HYSTERECTOMY      TUBAL LIGATION         Current Medications:    Current Outpatient Medications   Medication Sig Dispense Refill    ferrous sulfate (IRON 325) 325 (65 Fe) MG tablet Take 1 tablet by mouth 2 times daily 60 tablet 5    eltrombopag (PROMACTA) 12.5 MG TABS tablet Take 1 tablet by mouth daily 30 tablet 11    metoprolol succinate (TOPROL XL) 50 MG extended release tablet Take 50 mg by mouth daily      lisinopril-hydrochlorothiazide (PRINZIDE;ZESTORETIC) 20-25 MG per tablet Take 1 tablet by mouth daily      hydroxychloroquine (PLAQUENIL) 200 MG tablet Take by mouth daily       No current facility-administered medications for this visit. Allergies: Allergies   Allergen Reactions    Ondansetron Hcl     Oxycodone-Aspirin Other (See Comments)       Social History:    Social History     Tobacco Use    Smoking status: Never Smoker    Smokeless tobacco: Never Used   Substance Use Topics    Alcohol use: Not Currently    Drug use: Never       Family History:   Family History   Problem Relation Age of Onset    Heart Disease Mother     Cancer Sister     Cancer Brother        Vitals:  Vitals:    12/02/21 1001   BP: (!) 142/68   Pulse: 64   SpO2: 99%   Weight: 111 lb 4.8 oz (50.5 kg)   Height: 5' (1.524 m)        Subjective   REVIEW OF SYSTEMS:   Review of Systems   Constitutional: Positive for fatigue (Chronic with no change). Negative for chills, diaphoresis and fever. HENT: Negative. Negative for congestion, ear pain, hearing loss, nosebleeds, sore throat and tinnitus. Eyes: Negative. Negative for pain, discharge and redness. Respiratory: Negative. Negative for cough, shortness of breath and wheezing. Cardiovascular: Negative. Negative for chest pain, palpitations and leg swelling. Gastrointestinal: Positive for nausea (Occasional medication induced). Negative for abdominal pain, blood in stool, constipation, diarrhea and vomiting. Endocrine: Negative for polydipsia. Genitourinary: Negative for dysuria, flank pain, frequency, hematuria and urgency. Musculoskeletal: Negative. Negative for back pain, myalgias and neck pain. Skin: Negative. Negative for rash. Neurological: Negative. Negative for dizziness, tremors, seizures, weakness and headaches. Hematological: Does not bruise/bleed easily. Psychiatric/Behavioral: Negative. The patient is not nervous/anxious. Objective   PHYSICAL EXAM:  Physical Exam  Vitals reviewed. Constitutional:       General: She is not in acute distress. Appearance: She is well-developed. She is not diaphoretic. HENT:      Head: Normocephalic and atraumatic. Mouth/Throat:      Pharynx: Uvula midline. Tonsils: No tonsillar exudate. Eyes:      General: Lids are normal. No scleral icterus. Right eye: No discharge. Left eye: No discharge. Conjunctiva/sclera: Conjunctivae normal.      Pupils: Pupils are equal, round, and reactive to light. Neck:      Thyroid: No thyroid mass or thyromegaly. Vascular: No JVD. Trachea: Trachea normal. No tracheal deviation. Cardiovascular:      Rate and Rhythm: Normal rate and regular rhythm. Heart sounds: Normal heart sounds. No murmur heard. No friction rub. No gallop. Pulmonary:      Effort: Pulmonary effort is normal. No respiratory distress. Breath sounds: Normal breath sounds. No wheezing or rales. Chest:      Chest wall: No tenderness. Abdominal:      General: Bowel sounds are normal. There is no distension. Palpations: Abdomen is soft. There is no mass. Tenderness: There is no abdominal tenderness. There is no guarding or rebound. Hernia: No hernia is present. Musculoskeletal:         General: No tenderness or deformity. Cervical back: Normal range of motion and neck supple. Comments: Range of motion within normal limits x4 extremities   Skin:     General: Skin is warm. Coloration: Skin is not pale. Findings: No erythema or rash. Neurological:      Mental Status: She is alert and oriented to person, place, and time. Cranial Nerves: No cranial nerve deficit. Coordination: Coordination normal.   Psychiatric:         Behavior: Behavior normal.         Thought Content: Thought content normal.         Labs:  Lab Results   Component Value Date    WBC 4.53 12/02/2021    HGB 11.0 (L) 12/02/2021    HCT 31.7 (L) 12/02/2021    MCV 89.3 12/02/2021     12/02/2021     Lab Results   Component Value Date    NEUTROABS 3.33 12/02/2021       ASSESSMENT/PLAN:      1.  Chronic ITP (idiopathic thrombocytopenic purpura), continues to have an excellent response with Promacta. Continues to take Promacta 12.5 mg daily since January 2020 and has maintained a platelet count >595,563. If platelet count were to become greater than 400,000 would consider discontinuing Promacta at that time, her platelet count waxes and wanes but has remained > 150,000. Platelet count of 225,194 today. Continues to be compliant with the Promacta reports occasional nausea otherwise tolerating well.    -Continue Promacta 12.5 mg daily    2. Iron deficiency anemia secondary to inadequate dietary iron intake. Stable hemoglobin of 11.0 and MCV 89.3. Iron substrates were evaluated at previous appointment on 08/02/2021 that showed an excellent response to the oral iron replacement. Intolerant to ferrous sulfate 325 mg twice daily due to nausea, currently taking once daily and continues to tolerate well.      -Continue ferrous sulfate 325 mg p.o. daily  -CMP, iron panel, and ferritin today    3. Chronic fatigue, grade 1, stable with no change from previous evaluation    I discussed all of the above findings included in the assessment and plan with the patient and the patient is in agreement to move forward with current recommendations/treatment. I have addressed all of their questions and concerns that were verbalized. FOLLOW UP:   1. CBC in 2 months, and follow-up appointment given for 4 months sooner if needed  2. Continue to follow with other medical providers as recommended  3. Labs at next visit: CBC, CMP, Iron panel, Ferritin    Jefferson PATRICIA am pre-charting as a registered nurse for [employer], APRN. EMR Dragon/Transcription disclaimer:   Much of this encounter note is an electronic transcription/translation of spoken language to printed text.  The electronic translation of spoken language may permit erroneous, or at times, nonsensical words or phrases to be inadvertently transcribed; although attempts have made to review the note for such errors, some may still exist.  Please excuse any unrecognized transcription errors and contact us if the air is unintelligible or needs documented correction. Also, portions of this note have been copied forward, however, changed to reflect the most current clinical status of this patient.     Electronically signed by BETTINA Alfred on 12/17/2021 at 11:10 AM

## 2021-12-02 ENCOUNTER — HOSPITAL ENCOUNTER (OUTPATIENT)
Dept: INFUSION THERAPY | Age: 76
Discharge: HOME | End: 2021-12-02
Payer: MEDICARE

## 2021-12-02 ENCOUNTER — OFFICE VISIT (OUTPATIENT)
Dept: HEMATOLOGY | Age: 76
End: 2021-12-02
Payer: MEDICARE

## 2021-12-02 VITALS
HEART RATE: 64 BPM | DIASTOLIC BLOOD PRESSURE: 68 MMHG | HEIGHT: 60 IN | OXYGEN SATURATION: 99 % | WEIGHT: 111.3 LBS | BODY MASS INDEX: 21.85 KG/M2 | SYSTOLIC BLOOD PRESSURE: 142 MMHG

## 2021-12-02 DIAGNOSIS — D50.8 IRON DEFICIENCY ANEMIA SECONDARY TO INADEQUATE DIETARY IRON INTAKE: Primary | ICD-10-CM

## 2021-12-02 DIAGNOSIS — D69.3 CHRONIC ITP (IDIOPATHIC THROMBOCYTOPENIC PURPURA) (HCC): ICD-10-CM

## 2021-12-02 DIAGNOSIS — D50.8 IRON DEFICIENCY ANEMIA SECONDARY TO INADEQUATE DIETARY IRON INTAKE: ICD-10-CM

## 2021-12-02 DIAGNOSIS — R53.82 CHRONIC FATIGUE: ICD-10-CM

## 2021-12-02 LAB
ALBUMIN SERPL-MCNC: 4.7 G/DL (ref 3.5–5.2)
ALP BLD-CCNC: 65 U/L (ref 35–104)
ALT SERPL-CCNC: 14 U/L (ref 9–52)
ANION GAP SERPL CALCULATED.3IONS-SCNC: 11 MMOL/L (ref 7–19)
AST SERPL-CCNC: 26 U/L (ref 14–36)
BASOPHILS ABSOLUTE: 0.02 K/UL (ref 0.01–0.08)
BASOPHILS RELATIVE PERCENT: 0.4 % (ref 0.1–1.2)
BILIRUB SERPL-MCNC: 0.6 MG/DL (ref 0.2–1.3)
BUN BLDV-MCNC: 16 MG/DL (ref 7–17)
CALCIUM SERPL-MCNC: 10 MG/DL (ref 8.4–10.2)
CHLORIDE BLD-SCNC: 99 MMOL/L (ref 98–111)
CO2: 28 MMOL/L (ref 22–29)
CREAT SERPL-MCNC: 0.9 MG/DL (ref 0.5–1)
EOSINOPHILS ABSOLUTE: 0.1 K/UL (ref 0.04–0.54)
EOSINOPHILS RELATIVE PERCENT: 2.2 % (ref 0.7–7)
FERRITIN: 110 NG/ML (ref 11.1–264)
GFR NON-AFRICAN AMERICAN: >60
GLOBULIN: 2 G/DL
GLUCOSE BLD-MCNC: 98 MG/DL (ref 74–106)
HCT VFR BLD CALC: 31.7 % (ref 34.1–44.9)
HEMOGLOBIN: 11 G/DL (ref 11.2–15.7)
IRON SATURATION: 17 % (ref 14–50)
IRON: 46 UG/DL (ref 37–170)
LYMPHOCYTES ABSOLUTE: 0.77 K/UL (ref 1.18–3.74)
LYMPHOCYTES RELATIVE PERCENT: 17 % (ref 19.3–53.1)
MCH RBC QN AUTO: 31 PG (ref 25.6–32.2)
MCHC RBC AUTO-ENTMCNC: 34.7 G/DL (ref 32.3–35.5)
MCV RBC AUTO: 89.3 FL (ref 79.4–94.8)
MONOCYTES ABSOLUTE: 0.31 K/UL (ref 0.24–0.82)
MONOCYTES RELATIVE PERCENT: 6.8 % (ref 4.7–12.5)
NEUTROPHILS ABSOLUTE: 3.33 K/UL (ref 1.56–6.13)
NEUTROPHILS RELATIVE PERCENT: 73.6 % (ref 34–71.1)
PDW BLD-RTO: 12.3 % (ref 11.7–14.4)
PLATELET # BLD: 276 K/UL (ref 182–369)
PMV BLD AUTO: 8.5 FL (ref 7.4–10.4)
POTASSIUM SERPL-SCNC: 3.9 MMOL/L (ref 3.5–5.1)
RBC # BLD: 3.55 M/UL (ref 3.93–5.22)
SODIUM BLD-SCNC: 138 MMOL/L (ref 137–145)
TOTAL IRON BINDING CAPACITY: 270 UG/DL (ref 265–497)
TOTAL PROTEIN: 6.7 G/DL (ref 6.3–8.2)
WBC # BLD: 4.53 K/UL (ref 3.98–10.04)

## 2021-12-02 PROCEDURE — 36415 COLL VENOUS BLD VENIPUNCTURE: CPT

## 2021-12-02 PROCEDURE — 99212 OFFICE O/P EST SF 10 MIN: CPT

## 2021-12-02 PROCEDURE — 1036F TOBACCO NON-USER: CPT | Performed by: NURSE PRACTITIONER

## 2021-12-02 PROCEDURE — 1090F PRES/ABSN URINE INCON ASSESS: CPT | Performed by: NURSE PRACTITIONER

## 2021-12-02 PROCEDURE — 85025 COMPLETE CBC W/AUTO DIFF WBC: CPT

## 2021-12-02 PROCEDURE — 80053 COMPREHEN METABOLIC PANEL: CPT

## 2021-12-02 PROCEDURE — 83550 IRON BINDING TEST: CPT

## 2021-12-02 PROCEDURE — 83540 ASSAY OF IRON: CPT

## 2021-12-02 PROCEDURE — 1123F ACP DISCUSS/DSCN MKR DOCD: CPT | Performed by: NURSE PRACTITIONER

## 2021-12-02 PROCEDURE — 82728 ASSAY OF FERRITIN: CPT

## 2021-12-02 PROCEDURE — 3017F COLORECTAL CA SCREEN DOC REV: CPT | Performed by: NURSE PRACTITIONER

## 2021-12-02 PROCEDURE — G8420 CALC BMI NORM PARAMETERS: HCPCS | Performed by: NURSE PRACTITIONER

## 2021-12-02 PROCEDURE — G8427 DOCREV CUR MEDS BY ELIG CLIN: HCPCS | Performed by: NURSE PRACTITIONER

## 2021-12-02 PROCEDURE — G8484 FLU IMMUNIZE NO ADMIN: HCPCS | Performed by: NURSE PRACTITIONER

## 2021-12-02 PROCEDURE — G8400 PT W/DXA NO RESULTS DOC: HCPCS | Performed by: NURSE PRACTITIONER

## 2021-12-02 PROCEDURE — 99213 OFFICE O/P EST LOW 20 MIN: CPT | Performed by: NURSE PRACTITIONER

## 2021-12-02 PROCEDURE — 4040F PNEUMOC VAC/ADMIN/RCVD: CPT | Performed by: NURSE PRACTITIONER

## 2021-12-10 ENCOUNTER — TELEPHONE (OUTPATIENT)
Dept: HEMATOLOGY | Age: 76
End: 2021-12-10

## 2021-12-10 NOTE — TELEPHONE ENCOUNTER
Patient called stating that her oral surgery has been scheduled for 12/29. Talked to BETTINA Calderón. She would like for the patient to come by office early on 12/29 to have her platelets checked prior to the surgery. Appointment set up for 12/29 at 0800 for CBC check. Patient v/u.

## 2021-12-17 ASSESSMENT — ENCOUNTER SYMPTOMS: NAUSEA: 1

## 2021-12-29 ENCOUNTER — HOSPITAL ENCOUNTER (OUTPATIENT)
Dept: INFUSION THERAPY | Age: 76
Discharge: HOME | End: 2021-12-29
Payer: MEDICARE

## 2021-12-29 DIAGNOSIS — D69.3 CHRONIC ITP (IDIOPATHIC THROMBOCYTOPENIC PURPURA) (HCC): ICD-10-CM

## 2021-12-29 LAB
BASOPHILS ABSOLUTE: 0.02 K/UL (ref 0.01–0.08)
BASOPHILS RELATIVE PERCENT: 0.4 % (ref 0.1–1.2)
EOSINOPHILS ABSOLUTE: 0.15 K/UL (ref 0.04–0.54)
EOSINOPHILS RELATIVE PERCENT: 3.4 % (ref 0.7–7)
HCT VFR BLD CALC: 32 % (ref 34.1–44.9)
HEMOGLOBIN: 10.9 G/DL (ref 11.2–15.7)
LYMPHOCYTES ABSOLUTE: 1.02 K/UL (ref 1.18–3.74)
LYMPHOCYTES RELATIVE PERCENT: 22.8 % (ref 19.3–53.1)
MCH RBC QN AUTO: 30.7 PG (ref 25.6–32.2)
MCHC RBC AUTO-ENTMCNC: 34.1 G/DL (ref 32.3–35.5)
MCV RBC AUTO: 90.1 FL (ref 79.4–94.8)
MONOCYTES ABSOLUTE: 0.4 K/UL (ref 0.24–0.82)
MONOCYTES RELATIVE PERCENT: 8.9 % (ref 4.7–12.5)
NEUTROPHILS ABSOLUTE: 2.88 K/UL (ref 1.56–6.13)
NEUTROPHILS RELATIVE PERCENT: 64.5 % (ref 34–71.1)
PDW BLD-RTO: 12.6 % (ref 11.7–14.4)
PLATELET # BLD: 205 K/UL (ref 182–369)
PMV BLD AUTO: 9.7 FL (ref 7.4–10.4)
RBC # BLD: 3.55 M/UL (ref 3.93–5.22)
WBC # BLD: 4.47 K/UL (ref 3.98–10.04)

## 2021-12-29 PROCEDURE — 36415 COLL VENOUS BLD VENIPUNCTURE: CPT

## 2021-12-29 PROCEDURE — 85025 COMPLETE CBC W/AUTO DIFF WBC: CPT

## 2022-01-31 ENCOUNTER — OFFICE VISIT (OUTPATIENT)
Dept: CARDIOLOGY | Facility: CLINIC | Age: 77
End: 2022-01-31

## 2022-01-31 VITALS
WEIGHT: 102 LBS | OXYGEN SATURATION: 98 % | DIASTOLIC BLOOD PRESSURE: 66 MMHG | SYSTOLIC BLOOD PRESSURE: 110 MMHG | HEART RATE: 68 BPM | BODY MASS INDEX: 20.03 KG/M2 | HEIGHT: 60 IN

## 2022-01-31 DIAGNOSIS — I27.20 PULMONARY HYPERTENSION: ICD-10-CM

## 2022-01-31 DIAGNOSIS — R00.2 HEART PALPITATIONS: ICD-10-CM

## 2022-01-31 DIAGNOSIS — I10 ESSENTIAL HYPERTENSION: Primary | ICD-10-CM

## 2022-01-31 PROCEDURE — 99214 OFFICE O/P EST MOD 30 MIN: CPT | Performed by: INTERNAL MEDICINE

## 2022-01-31 RX ORDER — LISINOPRIL AND HYDROCHLOROTHIAZIDE 25; 20 MG/1; MG/1
1 TABLET ORAL DAILY
Qty: 90 TABLET | Refills: 3 | Status: SHIPPED | OUTPATIENT
Start: 2022-01-31 | End: 2023-03-27 | Stop reason: ALTCHOICE

## 2022-01-31 RX ORDER — METOPROLOL SUCCINATE 50 MG/1
75 TABLET, EXTENDED RELEASE ORAL DAILY
Qty: 135 TABLET | Refills: 3 | Status: SHIPPED | OUTPATIENT
Start: 2022-01-31 | End: 2022-12-08 | Stop reason: SDUPTHER

## 2022-01-31 NOTE — PROGRESS NOTES
Reason for Visit: cardiovascular follow up.    HPI:  Sarah Espinoza is a 76 y.o. female is here today for 1 year follow-up.  She has been doing well and not having any issues or complaints.  She denies any chest pain, palpitations, dizziness, syncope, PND, or orthopnea.  She is active and has no problems with physical exertion.  Her blood pressure has been well controlled.    Previous Cardiac Testing and Procedures:  - Echo (12/21/2016) EF 55%, grade 1 diastolic dysfunction, RVSP 45-55 mmHg, normal RV size and function  - Nuclear stress (01/09/2017) normal myocardial perfusion with no evidence of ischemia, EF 70%  - Lipid panel (12/20/16) 113/26/69/103  - BMP (12/2/2021) creatinine 0.9, potassium 3.9, sodium 138    Patient Active Problem List   Diagnosis   • Essential hypertension   • Lupus (HCC)   • Arthritis   • Female bladder prolapse   • Heart palpitations   • Seasonal allergies   • Uterine prolapse   • Pulmonary hypertension (HCC)   • Sjogren's disease (HCC)   • Heme positive stool   • Thrombocytopenia (HCC)       Social History     Tobacco Use   • Smoking status: Never Smoker   • Smokeless tobacco: Never Used   Substance Use Topics   • Alcohol use: No   • Drug use: No       Family History   Problem Relation Age of Onset   • Heart disease Mother    • No Known Problems Father    • No Known Problems Sister    • Kidney cancer Brother    • Leukemia Sister    • Breast cancer Neg Hx    • Colon cancer Neg Hx    • Esophageal cancer Neg Hx        The following portions of the patient's history were reviewed and updated as appropriate: allergies, current medications, past family history, past medical history, past social history, past surgical history and problem list.      Current Outpatient Medications:   •  eltrombopag (PROMACTA) 75 MG tablet tablet, Take 12.5 mg by mouth Daily., Disp: , Rfl:   •  Ferrous Sulfate (IRON PO), Take  by mouth., Disp: , Rfl:   •  hydroxychloroquine (PLAQUENIL) 200 MG tablet, Take   "by mouth Daily., Disp: , Rfl:   •  lisinopril-hydrochlorothiazide (PRINZIDE,ZESTORETIC) 20-25 MG per tablet, Take 1 tablet by mouth Daily., Disp: 90 tablet, Rfl: 3  •  metoprolol succinate XL (TOPROL-XL) 50 MG 24 hr tablet, Take 1.5 tablets by mouth Daily., Disp: 135 tablet, Rfl: 3    Review of Systems   Constitutional: Negative for chills and fever.   Cardiovascular: Negative for chest pain and paroxysmal nocturnal dyspnea.   Respiratory: Negative for cough and shortness of breath.    Skin: Negative for rash.   Gastrointestinal: Negative for abdominal pain and heartburn.   Neurological: Negative for dizziness and numbness.       Objective   /66 (BP Location: Left arm, Patient Position: Sitting, Cuff Size: Adult)   Pulse 68   Ht 152.4 cm (60\")   Wt 46.3 kg (102 lb)   SpO2 98%   BMI 19.92 kg/m²   Constitutional:       Appearance: Well-developed.   HENT:      Head: Normocephalic and atraumatic.   Pulmonary:      Effort: Pulmonary effort is normal.      Breath sounds: Normal breath sounds.   Cardiovascular:      Normal rate. Regular rhythm.      Murmurs: There is no murmur.      No gallop. No click.   Skin:     General: Skin is warm and dry.   Neurological:      Mental Status: Alert and oriented to person, place, and time.       Procedures      ICD-10-CM ICD-9-CM   1. Essential hypertension  I10 401.9   2. Pulmonary hypertension (HCC)  I27.20 416.8   3. Heart palpitations  R00.2 785.1         Assessment/Plan:  1. Systemic hypertension: Blood pressure remains well controlled on Toprol, lisinopril, and HCTZ.     2.  Pulmonary hypertension: RVSP 45-55 mmHg on echo from 12/2016.    Felt to be due to Lupus and Sjogren's diasease.  No significant shortness of breath at this time.    3.  Palpitations: Symptoms controlled on metoprolol.    "

## 2022-02-03 ENCOUNTER — HOSPITAL ENCOUNTER (OUTPATIENT)
Dept: INFUSION THERAPY | Age: 77
Discharge: HOME | End: 2022-02-03

## 2022-02-03 DIAGNOSIS — D69.3 CHRONIC ITP (IDIOPATHIC THROMBOCYTOPENIC PURPURA) (HCC): ICD-10-CM

## 2022-02-10 ENCOUNTER — HOSPITAL ENCOUNTER (OUTPATIENT)
Dept: INFUSION THERAPY | Age: 77
Discharge: HOME | End: 2022-02-10
Payer: MEDICARE

## 2022-02-10 DIAGNOSIS — D69.3 CHRONIC ITP (IDIOPATHIC THROMBOCYTOPENIC PURPURA) (HCC): ICD-10-CM

## 2022-02-10 LAB
BASOPHILS ABSOLUTE: 0.03 K/UL (ref 0.01–0.08)
BASOPHILS RELATIVE PERCENT: 0.9 % (ref 0.1–1.2)
EOSINOPHILS ABSOLUTE: 0.18 K/UL (ref 0.04–0.54)
EOSINOPHILS RELATIVE PERCENT: 5.2 % (ref 0.7–7)
HCT VFR BLD CALC: 28.1 % (ref 34.1–44.9)
HEMOGLOBIN: 9.7 G/DL (ref 11.2–15.7)
LYMPHOCYTES ABSOLUTE: 0.73 K/UL (ref 1.18–3.74)
LYMPHOCYTES RELATIVE PERCENT: 21.2 % (ref 19.3–53.1)
MCH RBC QN AUTO: 30.9 PG (ref 25.6–32.2)
MCHC RBC AUTO-ENTMCNC: 34.5 G/DL (ref 32.3–35.5)
MCV RBC AUTO: 89.5 FL (ref 79.4–94.8)
MONOCYTES ABSOLUTE: 0.43 K/UL (ref 0.24–0.82)
MONOCYTES RELATIVE PERCENT: 12.5 % (ref 4.7–12.5)
NEUTROPHILS ABSOLUTE: 2.07 K/UL (ref 1.56–6.13)
NEUTROPHILS RELATIVE PERCENT: 60.2 % (ref 34–71.1)
PDW BLD-RTO: 12.9 % (ref 11.7–14.4)
PLATELET # BLD: 221 K/UL (ref 182–369)
PMV BLD AUTO: 9.2 FL (ref 7.4–10.4)
RBC # BLD: 3.14 M/UL (ref 3.93–5.22)
WBC # BLD: 3.44 K/UL (ref 3.98–10.04)

## 2022-02-10 PROCEDURE — 36415 COLL VENOUS BLD VENIPUNCTURE: CPT

## 2022-02-10 PROCEDURE — 85025 COMPLETE CBC W/AUTO DIFF WBC: CPT

## 2022-04-04 DIAGNOSIS — D50.8 IRON DEFICIENCY ANEMIA SECONDARY TO INADEQUATE DIETARY IRON INTAKE: Primary | ICD-10-CM

## 2022-04-05 NOTE — PROGRESS NOTES
Progress Note      Pt Name: Davie Juarez  YOB: 1945  MRN: [id number]    Date of evaluation: 04/06/2022  History Obtained From:  patient, electronic medical record    CHIEF COMPLAINT:    Chief Complaint   Patient presents with    Follow-up     Iron deficiency anemia secondary to inadequate dietary iron intake    Other     Chronic ITP    Fatigue    Discuss Labs     HISTORY OF PRESENT ILLNESS:    Davie Juarez is a 78 y.o.  female with significant PMH of suspected ITP and anemia. [place] was initiated on Promacta in May 2018 and has had an excellent response to treatment. Her Promacta dose has been decreased to 12.5 mg daily, previously receiving max dose of 75 mg daily. In relation to her anemia she takes ferrous sulfate 325 mg p.o. daily. [place] returns today in scheduled follow-up for evaluation, side effect monitoring, lab monitoring further treatment recommendations. She presents today with no new complaints and indicates that overall she is doing well. Reports compliant with Promacta 12.5 mg daily without difficulty. She also reports she continues to take her ferrous sulfate 325 mg once daily is tolerating significant difficulty. Platelet count stable at 311 has a hemoglobin of 9.9 and hematocrit 28.6 today. Iron substrates on 12/2/2021 are within acceptable limits. 12/02/2021 Serology results  · Iron 46  · TIBC 270  · Saturation 17%  · Ferritin 110      Lab results reviewed with patient today and are documented below. Up-to-Date  Promacta: Information obtained from     HEMATOLOGY HISTORY:   Diagnosis: Thrombocytopenia with differential diagnosis of ITP    Sjogrens  Iron deficiency anemia    Treatment summary:  Initiation of prednisone at 50 mg daily on 4/27/2017 and completed on 6/22/2017 after weekly dose titration of 10 mg weekly.    Prednisone 15 mg daily ×14 days, completed on 11/18/2017 for planned colonoscopy for positive occult stools. 5/2/2018 -initiation of Promacta 50 mg by mouth daily. Dose increased to 75 mg daily on 10/3/2018.   6/21/2019- decreased Promacta dosing to 50 mg   11/25/2019-decrease Promacta to 25 mg daily  1/21/2020-decrease Promacta 12.5 mg daily  7/15/2020-ferrous sulfate 325 mg 1 tablet p.o. x7 days then increase to twice daily if tolerated, intolerant to twice daily due to nausea. Tolerating once daily. HEMATOLOGY HISTORY:  [place] was seen in initial hematology consultation on 4/27/2017 for new onset of thrombocytopenia and anemia in referral from Dr. Soren Garcia. [place] has a history of lupus and a history of acute blood loss secondary to a vaginal hysterectomy with anterior posterior vaginal repair, and cystoscopy on 12/14/2016. She is followed by Dr. Mariusz Valderrama in [place] for her lupus and denies any significant flareups. She denies any previous history of thrombocytopenia or bleeding tendencies. Review of her present medication list did not reveal any medications that cause significant thrombocytopenia and she denies drinking any tonic water. CBC HISTORY:  12/19/2016 - WBC 8.36, hemoglobin 7.4, hematocrit 20.5, MCV 79.2 and a platelet count of 623,273.   12/20/2016 - WBC 4.67, neutrophils 80.9%, hemoglobin 10.2, hematocrit 29, MCV 82.6 and a platelet count of 736,056.   12/22/2016 - WBC 6.4, neutrophils 70.8%, hemoglobin 10, hematocrit 29.8, MCV 84.7, and a platelet count 058,890.   12/28/2016 - WBC 6.6, neutrophils 66.2%, hemoglobin 12.3, hematocrit 37.1, MCV 86, and a platelet count of 85,042.   4/27/2017- WBC 5.17, neutrophils 59%, hemoglobin 10.3, hematocrit 33.3, MCV 92.5 and a platelet count of 17,774. Normocytic, normochromic anemia- with a history of acute blood loss (12/2016). Hemoglobin has stabilized.   Serum studies on 4/27/2017:  Iron 50   TIBC 276   Iron saturation 18%   Ferritin 466 (H)   Vitamin B12 426   Folate 17.9   Haptoglobin 116   Reticulocyte percent 1.3    Repeat serology studies on 9/21/2017:  Iron 66   TIBC 283   Iron saturation 23%   Ferritin 263   Vitamin B12 448   Folate 11.9   Referral potent 9.0   Reticulocyte count 1.2   IgG 625   IgA 204   IgM 204    10/6/2017 occult stool positive ×1. Colonoscopy on 11/17/2017 by Dr. Sharon Callahan revealed diverticulosis in the sigmoid and descending colon. Localized mild inflammation was found in the descending colon secondary to ischemic colitis. Serology studies on 8/13/2018:  Iron 163   TIBC 343   Iron saturation 48%   Ferritin 269   Vitamin B12 319   Folate 6.0   Erythropoietin 8.3   Reticulocyte count 1%   Creatinine 1.06   GFR 53       Serology studies on 9/24/2019:  · Iron 94  · TIBC 362  · Iron saturation 26%  · Ferritin 52.5  · Folate 5.52  · Vitamin B12 270      Serology studies on 6/3/2020:  · iron of 80  · TIBC 419  · Iron saturation 19%  · Ferritin 33.2  · Reticulocyte count of 1.5%  · Creatinine 1.0/GFR 54    Serology studies on 9/2/2020:  · Ferritin 48.4  · Iron 82  · Iron saturation 21%  · TIBC 400  · Creatinine 1.0/GFR 54    Thrombocytopenia- no known identifiable cause of onset, differential diagnosis of ITP. Review of medications did not reveal any known medications that induce thrombocytopenia. [place] denied any bleeding tendencies or significant ecchymosis. Serum studies on 4/27/2017:     Anticardiolipin IgA and IgG <9, IgM 12   PTT 24, PT 9.7, and INR 0.9   Rheumatoid factor 20.8 (H)   MANUEL positive   CRP <0.3   ESR 4   hepatitis C antibody <0.1   HIV negative   M spike not observed     Ultrasound of the spleen on 4/28/2017 revealed no abnormalities and spleen measures 8.8 x 3.2 x 3.1 cm. Promacta 50 mg daily initiated on 5/2/2018. The goal is to achieve and maintain a platelet count equal to or greater than 50,000, to reduce the risk of bleeding. Promacta will be initiated at 50 mg daily. The Promacta, dose can be increased after 2 weeks of dosing with a max dosing of 75 mg daily.   Promacta increased to 75 mg daily on 10/3/2018 for a platelet count of 19,667. Promacta decreased to 50 mg daily on 6/20/2019 for platelet count being > 200,000, platelets 850,794. History of lupus: [place] is followed by Dr. Uriel Louis in [place]. She denies any significant joint pain, fatigue, febrile illness, or skin lesions, these symptoms are common with lupus. She reports a hiostory of hair loss and this symptom can be associated with lupus. She denies any recent flareups or lab testing related to her lupus diagnosis. [place] was evaluated by Dr. Shama Singh on 5/26/2017 and he is suggesting that [place] has a differential diagnosis of Sjogern's. Serum studies on 4/27/2017  ds- DNA antibody 1   Aguilar antibodies <0.2   Sjogren's antibodies (SSA) > 8.0 (H)   Sjogren's antibodies (SSB) 5.8 (H)   Antichromatin antibodies <0.2   C4 20   Centromere B antibody <0.2    Bone marrow aspiration biopsy on 4/17/2018 documented low normal cellular bone marrow for age (~20-30%) with trilineage hematopoiesis and a blast count of ~1%. Many small lymphoid aggregates with mixed CD3+ T and CD20+ B-cells seen on clot section (likely representing underlying autoimmune disease). No morphological or cytogenic (FISH) evidence of a myelodysplastic syndrome. No diagnostic evidence of lymphoroliferative disorder, granulomas or metastatic malignancy. Normal female karyotype. Thrombocytopenia along with adequate number of megakaryocytes is consistent with peripheral destruction/sequestration of platelets. Dr. Laura Frye has recommended initiation of Promacta. The goal is to achieve and maintain a platelet count equal to or greater than 50,000, to reduce the risk of bleeding. Promacta will be initiated at 50 mg daily. The Promacta, dose can be increased after 2 weeks of dosing with a max dosing of 75 mg daily.     [place] is knowledgeable of the possible side effects to include but not limited to fatigue, headache, insomnia, pruritus, anemia, and hepatotoxicity. Serology studies 2021  · Iron 72   · TIBC 400  · Iron saturation 18%  · Ferritin 76.4  · Folate 6.7  · Vitamin B-44=020  · Reticulocytes 1.3%  ·     Serology studies 2021  · Iron 64  · TIBC 302  · Saturation 21%  · Ferritin 170    2021 Serology results  · Iron 46  · TIBC 270  · Saturation 17%  · Ferritin 110    Age-appropriate health screenin2017 Colonoscopy at [facility] per Dr. Kristina Sanchez for positive guiac revealed diverticulosis in the sigmoid and descending colon. Localized mild inflammation was found in the descending colon secondary to ischemic colitis. [facility] there are scattered parenchymal densities, pattern B. There are postbiopsy changes in the left lateral breast, with stable lobularity of the breast parenchyma. No dominant mass or architectural distortion is identified. There are no suspicious calcifications, skin thickening or nipple retraction. There is mild inversion of the nipples laterally as before. Vascular calcification is are present. There is no significant interval change. Stable mammograms, no suspicious features are identified. No bone mineral density on file.     Past Medical History:    Past Medical History:   Diagnosis Date    Arrhythmia     CAD (coronary artery disease)     Hyperlipidemia     Hypertension     Immune thrombocytopenic purpura ([facility] [address] 2019    Sleep apnea     Systemic lupus erythematosus ([facility] [address] 2019    Thrombocytopenia, unspecified ([facility] [address] 2019       Past Surgical History:    Past Surgical History:   Procedure Laterality Date    APPENDECTOMY      BREAST SURGERY      FOOT SURGERY      HYSTERECTOMY      TUBAL LIGATION         Current Medications:    Current Outpatient Medications   Medication Sig Dispense Refill    ferrous sulfate (IRON 325) 325 (65 Fe) MG tablet Take 1 tablet by mouth 2 times daily 60 tablet 5    eltrombopag (PROMACTA) 12.5 MG TABS tablet Take 1 tablet by mouth daily 30 tablet 11    metoprolol succinate (TOPROL XL) 50 MG extended release tablet Take 50 mg by mouth daily      lisinopril-hydrochlorothiazide (PRINZIDE;ZESTORETIC) 20-25 MG per tablet Take 1 tablet by mouth daily      hydroxychloroquine (PLAQUENIL) 200 MG tablet Take by mouth daily       No current facility-administered medications for this visit. Allergies: Allergies   Allergen Reactions    Ondansetron Hcl     Oxycodone-Aspirin Other (See Comments)       Social History:    Social History     Tobacco Use    Smoking status: Never Smoker    Smokeless tobacco: Never Used   Substance Use Topics    Alcohol use: Not Currently    Drug use: Never       Family History:   Family History   Problem Relation Age of Onset    Heart Disease Mother     Cancer Sister     Cancer Brother        Vitals:  Vitals:    04/06/22 0933   BP: 116/62   Pulse: 98   SpO2: 100%   Weight: 100 lb 1.6 oz (45.4 kg)        Subjective   REVIEW OF SYSTEMS:   Review of Systems   Constitutional: Positive for fatigue. Negative for chills, diaphoresis and fever. HENT: Negative. Negative for congestion, ear pain, hearing loss, nosebleeds, sore throat and tinnitus. Eyes: Negative. Negative for pain, discharge and redness. Respiratory: Negative. Negative for cough, shortness of breath and wheezing. Cardiovascular: Negative. Negative for chest pain, palpitations and leg swelling. Gastrointestinal: Negative. Negative for abdominal pain, blood in stool, constipation, diarrhea, nausea and vomiting. Endocrine: Negative for polydipsia. Genitourinary: Negative for dysuria, flank pain, frequency, hematuria and urgency. Musculoskeletal: Negative. Negative for back pain, myalgias and neck pain. Skin: Negative. Negative for rash. Neurological: Negative. Negative for dizziness, tremors, seizures, weakness and headaches. Hematological: Does not bruise/bleed easily. Psychiatric/Behavioral: Negative. The patient is not nervous/anxious. Objective   PHYSICAL EXAM:  Physical Exam  Vitals reviewed. Constitutional:       General: She is not in acute distress. Appearance: She is well-developed. HENT:      Head: Normocephalic and atraumatic. Mouth/Throat:      Pharynx: Uvula midline. Tonsils: No tonsillar exudate. Eyes:      General: Lids are normal.      Conjunctiva/sclera: Conjunctivae normal.      Pupils: Pupils are equal, round, and reactive to light. Neck:      Thyroid: No thyroid mass or thyromegaly. Vascular: No JVD. Trachea: Trachea normal. No tracheal deviation. Cardiovascular:      Rate and Rhythm: Normal rate and regular rhythm. Pulses: Normal pulses. Heart sounds: Normal heart sounds. Pulmonary:      Effort: Pulmonary effort is normal. No respiratory distress. Breath sounds: Normal breath sounds. No wheezing or rales. Chest:      Chest wall: No tenderness. Abdominal:      General: Bowel sounds are normal. There is no distension. Palpations: Abdomen is soft. There is no mass. Tenderness: There is no abdominal tenderness. There is no guarding. Musculoskeletal:         General: No tenderness or deformity. Cervical back: Normal range of motion and neck supple. Comments: Range of motion within normal limits x4 extremities   Skin:     General: Skin is warm. Findings: No bruising, erythema or rash. Neurological:      Mental Status: She is alert and oriented to person, place, and time. Cranial Nerves: No cranial nerve deficit. Coordination: Coordination normal.   Psychiatric:         Behavior: Behavior normal.         Thought Content:  Thought content normal.         Labs:  Lab Results   Component Value Date    WBC 4.68 04/06/2022    HGB 9.9 (L) 04/06/2022    HCT 28.6 (L) 04/06/2022    MCV 86.7 04/06/2022     04/06/2022     Lab Results   Component Value Date    NEUTROABS 3.24 04/06/2022         ASSESSMENT/PLAN:      1. Chronic ITP (idiopathic thrombocytopenic purpura), continues to have an excellent response with Promacta. Continues to take Promacta 12.5 mg daily since January 2020 and has maintained a platelet count >428,040. If platelet count were to become greater than 400,000 would consider discontinuing Promacta at that time, her platelet count waxes and wanes ranging from 205,000-311,000 over the past 4 months. Platelet count of 238,761 today.      -Continue Promacta 12.5 mg daily    2. Iron deficiency anemia secondary to inadequate dietary iron intake. Hemoglobin has declined from 11.0 to 9.9. She denies noting any bleeding to include melena, epistaxis, hemoptysis, hematuria or hematochezia. Iron substrates were evaluated on 12/02/2021 that were stable and recommendation was to continue with oral iron replacement. Intolerant to ferrous sulfate 325 mg twice daily due to nausea, currently taking once daily and continues to tolerate well. 12/02/2021 Serology results  · Iron 46  · TIBC 270  · Saturation 17%  · Ferritin 110    -Continue ferrous sulfate 325 mg p.o. daily  -CMP, iron panel, and ferritin today    3. Chronic fatigue, grade 1, no change from previous evaluation  I discussed all of the above findings included in the assessment and plan with the patient and the patient is in agreement to move forward with current recommendations/treatment. I have addressed all of their questions and concerns that were verbalized. FOLLOW UP:   1. CBC in 2 months, and follow-up appointment given for 4 months sooner if needed  2. Continue to follow with other medical providers as recommended  3. Labs at next visit: CBC, CMP, Iron panel, Ferritin    EMR Dragon/Transcription disclaimer:   Much of this encounter note is an electronic transcription/translation of spoken language to printed text.  The electronic translation of spoken language may permit erroneous, or at times, nonsensical words or phrases to be inadvertently transcribed; although attempts have made to review the note for such errors, some may still exist.  Please excuse any unrecognized transcription errors and contact us if the air is unintelligible or needs documented correction. Also, portions of this note have been copied forward, however, changed to reflect the most current clinical status of this patient. Electronically signed by BETTINA Lopez on 4/11/2022 at 1:11 PM  Yaritza PATRICIA am pre-charting as a registered nurse for BETTINA Block.

## 2022-04-06 ENCOUNTER — OFFICE VISIT (OUTPATIENT)
Dept: HEMATOLOGY | Age: 77
End: 2022-04-06
Payer: MEDICARE

## 2022-04-06 ENCOUNTER — HOSPITAL ENCOUNTER (OUTPATIENT)
Dept: INFUSION THERAPY | Age: 77
Discharge: HOME | End: 2022-04-06
Payer: MEDICARE

## 2022-04-06 VITALS
OXYGEN SATURATION: 100 % | WEIGHT: 100.1 LBS | SYSTOLIC BLOOD PRESSURE: 116 MMHG | HEART RATE: 98 BPM | BODY MASS INDEX: 19.55 KG/M2 | DIASTOLIC BLOOD PRESSURE: 62 MMHG

## 2022-04-06 DIAGNOSIS — D69.3 CHRONIC ITP (IDIOPATHIC THROMBOCYTOPENIC PURPURA) (HCC): ICD-10-CM

## 2022-04-06 DIAGNOSIS — R53.82 CHRONIC FATIGUE: ICD-10-CM

## 2022-04-06 DIAGNOSIS — D69.3 CHRONIC ITP (IDIOPATHIC THROMBOCYTOPENIC PURPURA) (HCC): Primary | ICD-10-CM

## 2022-04-06 DIAGNOSIS — D50.8 IRON DEFICIENCY ANEMIA SECONDARY TO INADEQUATE DIETARY IRON INTAKE: ICD-10-CM

## 2022-04-06 LAB
ALBUMIN SERPL-MCNC: 4.5 G/DL (ref 3.5–5.2)
ALP BLD-CCNC: 64 U/L (ref 35–104)
ALT SERPL-CCNC: 14 U/L (ref 9–52)
ANION GAP SERPL CALCULATED.3IONS-SCNC: 14 MMOL/L (ref 7–19)
AST SERPL-CCNC: 25 U/L (ref 14–36)
BASOPHILS ABSOLUTE: 0.03 K/UL (ref 0.01–0.08)
BASOPHILS RELATIVE PERCENT: 0.6 % (ref 0.1–1.2)
BILIRUB SERPL-MCNC: 0.4 MG/DL (ref 0.2–1.3)
BUN BLDV-MCNC: 18 MG/DL (ref 7–17)
CALCIUM SERPL-MCNC: 9.8 MG/DL (ref 8.4–10.2)
CHLORIDE BLD-SCNC: 94 MMOL/L (ref 98–111)
CO2: 26 MMOL/L (ref 22–29)
CREAT SERPL-MCNC: 1 MG/DL (ref 0.5–1)
EOSINOPHILS ABSOLUTE: 0.09 K/UL (ref 0.04–0.54)
EOSINOPHILS RELATIVE PERCENT: 1.9 % (ref 0.7–7)
FERRITIN: 286 NG/ML (ref 13–150)
GFR NON-AFRICAN AMERICAN: 54
GLOBULIN: 2.4 G/DL
GLUCOSE BLD-MCNC: 95 MG/DL (ref 74–106)
HCT VFR BLD CALC: 28.6 % (ref 34.1–44.9)
HEMOGLOBIN: 9.9 G/DL (ref 11.2–15.7)
IRON SATURATION: 18 % (ref 14–50)
IRON: 56 UG/DL (ref 37–145)
LYMPHOCYTES ABSOLUTE: 0.87 K/UL (ref 1.18–3.74)
LYMPHOCYTES RELATIVE PERCENT: 18.6 % (ref 19.3–53.1)
MCH RBC QN AUTO: 30 PG (ref 25.6–32.2)
MCHC RBC AUTO-ENTMCNC: 34.6 G/DL (ref 32.3–35.5)
MCV RBC AUTO: 86.7 FL (ref 79.4–94.8)
MONOCYTES ABSOLUTE: 0.45 K/UL (ref 0.24–0.82)
MONOCYTES RELATIVE PERCENT: 9.6 % (ref 4.7–12.5)
NEUTROPHILS ABSOLUTE: 3.24 K/UL (ref 1.56–6.13)
NEUTROPHILS RELATIVE PERCENT: 69.3 % (ref 34–71.1)
PDW BLD-RTO: 12.3 % (ref 11.7–14.4)
PLATELET # BLD: 311 K/UL (ref 182–369)
PMV BLD AUTO: 8.8 FL (ref 7.4–10.4)
POTASSIUM SERPL-SCNC: 3.8 MMOL/L (ref 3.5–5.1)
RBC # BLD: 3.3 M/UL (ref 3.93–5.22)
SODIUM BLD-SCNC: 134 MMOL/L (ref 137–145)
TOTAL IRON BINDING CAPACITY: 311 UG/DL (ref 250–400)
TOTAL PROTEIN: 6.9 G/DL (ref 6.3–8.2)
WBC # BLD: 4.68 K/UL (ref 3.98–10.04)

## 2022-04-06 PROCEDURE — 80053 COMPREHEN METABOLIC PANEL: CPT

## 2022-04-06 PROCEDURE — 3017F COLORECTAL CA SCREEN DOC REV: CPT | Performed by: NURSE PRACTITIONER

## 2022-04-06 PROCEDURE — 99212 OFFICE O/P EST SF 10 MIN: CPT

## 2022-04-06 PROCEDURE — 85025 COMPLETE CBC W/AUTO DIFF WBC: CPT

## 2022-04-06 PROCEDURE — G8427 DOCREV CUR MEDS BY ELIG CLIN: HCPCS | Performed by: NURSE PRACTITIONER

## 2022-04-06 PROCEDURE — G8420 CALC BMI NORM PARAMETERS: HCPCS | Performed by: NURSE PRACTITIONER

## 2022-04-06 PROCEDURE — 1036F TOBACCO NON-USER: CPT | Performed by: NURSE PRACTITIONER

## 2022-04-06 PROCEDURE — 4040F PNEUMOC VAC/ADMIN/RCVD: CPT | Performed by: NURSE PRACTITIONER

## 2022-04-06 PROCEDURE — 1090F PRES/ABSN URINE INCON ASSESS: CPT | Performed by: NURSE PRACTITIONER

## 2022-04-06 PROCEDURE — 1123F ACP DISCUSS/DSCN MKR DOCD: CPT | Performed by: NURSE PRACTITIONER

## 2022-04-06 PROCEDURE — 36415 COLL VENOUS BLD VENIPUNCTURE: CPT

## 2022-04-06 PROCEDURE — 99213 OFFICE O/P EST LOW 20 MIN: CPT | Performed by: NURSE PRACTITIONER

## 2022-04-06 PROCEDURE — G8400 PT W/DXA NO RESULTS DOC: HCPCS | Performed by: NURSE PRACTITIONER

## 2022-04-11 ASSESSMENT — ENCOUNTER SYMPTOMS
EYE REDNESS: 0
NAUSEA: 0
EYES NEGATIVE: 1
SORE THROAT: 0
BLOOD IN STOOL: 0
EYE DISCHARGE: 0
BACK PAIN: 0
CONSTIPATION: 0
EYE PAIN: 0
ABDOMINAL PAIN: 0
DIARRHEA: 0
GASTROINTESTINAL NEGATIVE: 1
VOMITING: 0
WHEEZING: 0
SHORTNESS OF BREATH: 0
COUGH: 0
RESPIRATORY NEGATIVE: 1

## 2022-06-07 DIAGNOSIS — D69.3 CHRONIC ITP (IDIOPATHIC THROMBOCYTOPENIC PURPURA) (HCC): ICD-10-CM

## 2022-06-07 DIAGNOSIS — D50.8 IRON DEFICIENCY ANEMIA SECONDARY TO INADEQUATE DIETARY IRON INTAKE: Primary | ICD-10-CM

## 2022-06-08 ENCOUNTER — HOSPITAL ENCOUNTER (OUTPATIENT)
Dept: INFUSION THERAPY | Age: 77
Discharge: HOME | End: 2022-06-08
Payer: MEDICARE

## 2022-06-08 DIAGNOSIS — D69.3 CHRONIC ITP (IDIOPATHIC THROMBOCYTOPENIC PURPURA) (HCC): ICD-10-CM

## 2022-06-08 DIAGNOSIS — D50.8 IRON DEFICIENCY ANEMIA SECONDARY TO INADEQUATE DIETARY IRON INTAKE: ICD-10-CM

## 2022-06-08 LAB
HCT VFR BLD CALC: 33.3 % (ref 34.1–44.9)
HEMOGLOBIN: 10.4 G/DL (ref 11.2–15.7)
MCH RBC QN AUTO: 29.4 PG (ref 25.6–32.2)
MCHC RBC AUTO-ENTMCNC: 31.2 G/DL (ref 32.3–35.5)
MCV RBC AUTO: 94.1 FL (ref 79.4–94.8)
PDW BLD-RTO: 12.3 % (ref 11.7–14.4)
PLATELET # BLD: 254 K/UL (ref 182–369)
PMV BLD AUTO: 9.7 FL (ref 7.4–10.4)
RBC # BLD: 3.54 M/UL (ref 3.93–5.22)
WBC # BLD: 4.96 K/UL (ref 3.98–10.04)

## 2022-06-08 PROCEDURE — 85025 COMPLETE CBC W/AUTO DIFF WBC: CPT

## 2022-08-01 ASSESSMENT — ENCOUNTER SYMPTOMS
SORE THROAT: 0
EYE DISCHARGE: 0
DIARRHEA: 0
COUGH: 0
EYE PAIN: 0
RESPIRATORY NEGATIVE: 1
WHEEZING: 0
BLOOD IN STOOL: 0
SHORTNESS OF BREATH: 0
BACK PAIN: 0
GASTROINTESTINAL NEGATIVE: 1
CONSTIPATION: 0
EYES NEGATIVE: 1
NAUSEA: 0
VOMITING: 0
EYE REDNESS: 0
ABDOMINAL PAIN: 0

## 2022-08-01 NOTE — PROGRESS NOTES
Progress Note      Pt Name: Kimberly Moran  YOB: 1945  MRN: [id number]    Date of evaluation: 08/03/2022  History Obtained From:  patient, electronic medical record    CHIEF COMPLAINT:    Chief Complaint   Patient presents with    Follow-up     Chronic ITP (idiopathic thrombocytopenic purpura) (HCC)    Anemia     HISTORY OF PRESENT ILLNESS:    Kimberly Moran is a 78 y.o.  female with significant PMH of suspected ITP and anemia. [place] was initiated on Promacta in May 2018 and has had an excellent response to treatment. Her Promacta dose has been decreased to 12.5 mg daily, previously receiving max dose of 75 mg daily. In relation to her anemia she takes ferrous sulfate 325 mg p.o. daily. [place] returns today in scheduled follow-up for evaluation, side effect monitoring, lab monitoring further treatment recommendations. She reports compliant with Promacta and iron and tolerating without difficulty. Today's clinic visit to include physical assessment, review of systems, any lab or radiographic findings that were available and plan of care are documented below. Up-to-Date  Promacta: Information obtained from     HEMATOLOGY HISTORY:   Diagnosis: Thrombocytopenia with differential diagnosis of ITP    Sjogrens  Iron deficiency anemia    Treatment summary:  Initiation of prednisone at 50 mg daily on 4/27/2017 and completed on 6/22/2017 after weekly dose titration of 10 mg weekly. Prednisone 15 mg daily ×14 days, completed on 11/18/2017 for planned colonoscopy for positive occult stools. 5/2/2018 -initiation of Promacta 50 mg by mouth daily. Dose increased to 75 mg daily on 10/3/2018.   6/21/2019- decreased Promacta dosing to 50 mg   11/25/2019-decrease Promacta to 25 mg daily  1/21/2020-decrease Promacta 12.5 mg daily  7/15/2020-ferrous sulfate 325 mg 1 tablet p.o. x7 days then increase to twice daily if tolerated, intolerant to twice daily due to nausea. descending colon secondary to ischemic colitis. Serology studies on 8/13/2018:  Iron 163   TIBC 343   Iron saturation 48%   Ferritin 269   Vitamin B12 319   Folate 6.0   Erythropoietin 8.3   Reticulocyte count 1%   Creatinine 1.06   GFR 53       Serology studies on 9/24/2019:  Iron 94  TIBC 362  Iron saturation 26%  Ferritin 52.5  Folate 5.52  Vitamin B12 270      Serology studies on 6/3/2020:  iron of 80  TIBC 419  Iron saturation 19%  Ferritin 33.2  Reticulocyte count of 1.5%  Creatinine 1.0/GFR 54    Serology studies on 9/2/2020:  Ferritin 48.4  Iron 82  Iron saturation 21%  TIBC 400  Creatinine 1.0/GFR 54    Thrombocytopenia- no known identifiable cause of onset, differential diagnosis of ITP. Review of medications did not reveal any known medications that induce thrombocytopenia. [place] denied any bleeding tendencies or significant ecchymosis. Serum studies on 4/27/2017:     Anticardiolipin IgA and IgG <9, IgM 12   PTT 24, PT 9.7, and INR 0.9   Rheumatoid factor 20.8 (H)   MANUEL positive   CRP <0.3   ESR 4   hepatitis C antibody <0.1   HIV negative   M spike not observed     Ultrasound of the spleen on 4/28/2017 revealed no abnormalities and spleen measures 8.8 x 3.2 x 3.1 cm. Promacta 50 mg daily initiated on 5/2/2018. The goal is to achieve and maintain a platelet count equal to or greater than 50,000, to reduce the risk of bleeding. Promacta will be initiated at 50 mg daily. The Promacta, dose can be increased after 2 weeks of dosing with a max dosing of 75 mg daily. Promacta increased to 75 mg daily on 10/3/2018 for a platelet count of 96,075. Promacta decreased to 50 mg daily on 6/20/2019 for platelet count being > 200,000, platelets 996,752. History of lupus: [place] is followed by Dr. Abbey Redding in [place]. She denies any significant joint pain, fatigue, febrile illness, or skin lesions, these symptoms are common with lupus.  She reports a hiostory of hair loss and this symptom can be associated with lupus. She denies any recent flareups or lab testing related to her lupus diagnosis. [place] was evaluated by Dr. Delphine Amador on 5/26/2017 and he is suggesting that [place] has a differential diagnosis of Sjogern's. Serum studies on 4/27/2017  ds- DNA antibody 1   Aguilar antibodies <0.2   Sjogren's antibodies (SSA) > 8.0 (H)   Sjogren's antibodies (SSB) 5.8 (H)   Antichromatin antibodies <0.2   C4 20   Centromere B antibody <0.2    Bone marrow aspiration biopsy on 4/17/2018 documented low normal cellular bone marrow for age (~20-30%) with trilineage hematopoiesis and a blast count of ~1%. Many small lymphoid aggregates with mixed CD3+ T and CD20+ B-cells seen on clot section (likely representing underlying autoimmune disease). No morphological or cytogenic (FISH) evidence of a myelodysplastic syndrome. No diagnostic evidence of lymphoroliferative disorder, granulomas or metastatic malignancy. Normal female karyotype. Thrombocytopenia along with adequate number of megakaryocytes is consistent with peripheral destruction/sequestration of platelets. Dr. Jacob Rust has recommended initiation of Promacta. The goal is to achieve and maintain a platelet count equal to or greater than 50,000, to reduce the risk of bleeding. Promacta will be initiated at 50 mg daily. The Promacta, dose can be increased after 2 weeks of dosing with a max dosing of 75 mg daily. [place] is knowledgeable of the possible side effects to include but not limited to fatigue, headache, insomnia, pruritus, anemia, and hepatotoxicity.     Serology studies 4/1/2021  Iron 72   TIBC 400  Iron saturation 18%  Ferritin 76.4  Folate 6.7  Vitamin B-03=809  Reticulocytes 1.3%      Serology studies 08/02/2021  Iron 64  TIBC 302  Saturation 21%  Ferritin 170    12/02/2021 Serology results  Iron 46  TIBC 270  Saturation 17%  Ferritin 110    04/06/2022 Serology results  Iron 56  TIBC 311  Saturation 18%  Ferritin 286    Age-appropriate health screenin2017 Colonoscopy at [facility] per Dr. Verner Pontes for positive guiac revealed diverticulosis in the sigmoid and descending colon. Localized mild inflammation was found in the descending colon secondary to ischemic colitis. [facility] there are scattered parenchymal densities, pattern B. There are postbiopsy changes in the left lateral breast, with stable lobularity of the breast parenchyma. No dominant mass or architectural distortion is identified. There are no suspicious calcifications, skin thickening or nipple retraction. There is mild inversion of the nipples laterally as before. Vascular calcification is are present. There is no significant interval change. Stable mammograms, no suspicious features are identified. No bone mineral density on file. Past Medical History:    Past Medical History:   Diagnosis Date    Arrhythmia     CAD (coronary artery disease)     Hyperlipidemia     Hypertension     Immune thrombocytopenic purpura ([facility] 2019    Sleep apnea     Systemic lupus erythematosus ([facility] 2019    Thrombocytopenia, unspecified ([facility] 2019       Past Surgical History:    Past Surgical History:   Procedure Laterality Date    APPENDECTOMY      BREAST SURGERY      FOOT SURGERY      HYSTERECTOMY      TUBAL LIGATION         Current Medications:    Current Outpatient Medications   Medication Sig Dispense Refill    lisinopril (PRINIVIL;ZESTRIL) 10 MG tablet Take 10 mg by mouth in the morning. ferrous sulfate (IRON 325) 325 (65 Fe) MG tablet Take 1 tablet by mouth 2 times daily 60 tablet 5    eltrombopag (PROMACTA) 12.5 MG TABS tablet Take 1 tablet by mouth daily 30 tablet 11    metoprolol succinate (TOPROL XL) 50 MG extended release tablet Take 50 mg by mouth daily      hydroxychloroquine (PLAQUENIL) 200 MG tablet Take by mouth daily       No current facility-administered medications for this visit. Allergies: Allergies   Allergen Reactions    Ondansetron Hcl     Oxycodone-Aspirin Other (See Comments)       Social History:    Social History     Tobacco Use    Smoking status: Never    Smokeless tobacco: Never   Substance Use Topics    Alcohol use: Not Currently    Drug use: Never       Family History:   Family History   Problem Relation Age of Onset    Heart Disease Mother     Cancer Sister     Cancer Brother        Vitals:  Vitals:    08/03/22 0952   BP: 136/72   Pulse: 84   SpO2: 97%   Weight: 97 lb 9.6 oz (44.3 kg)   Height: 5' (1.524 m)        Subjective   REVIEW OF SYSTEMS:   Review of Systems   Constitutional:  Positive for fatigue. Negative for chills, diaphoresis and fever. HENT: Negative. Negative for congestion, ear pain, hearing loss, nosebleeds, sore throat and tinnitus. Eyes: Negative. Negative for pain, discharge and redness. Respiratory: Negative. Negative for cough, shortness of breath and wheezing. Cardiovascular: Negative. Negative for chest pain, palpitations and leg swelling. Gastrointestinal: Negative. Negative for abdominal pain, blood in stool, constipation, diarrhea, nausea and vomiting. Endocrine: Negative for polydipsia. Genitourinary:  Negative for dysuria, flank pain, frequency, hematuria and urgency. Musculoskeletal: Negative. Negative for back pain, myalgias and neck pain. Skin: Negative. Negative for rash. Neurological: Negative. Negative for dizziness, tremors, seizures, weakness and headaches. Hematological:  Does not bruise/bleed easily. Psychiatric/Behavioral: Negative. The patient is not nervous/anxious. Objective   PHYSICAL EXAM:  Physical Exam  Vitals reviewed. Constitutional:       General: She is not in acute distress. Appearance: She is well-developed. HENT:      Head: Normocephalic and atraumatic. Mouth/Throat:      Pharynx: Uvula midline. Tonsils: No tonsillar exudate.    Eyes:      General: Lids are normal.      Conjunctiva/sclera: Conjunctivae normal.      Pupils: Pupils are equal, round, and reactive to light. Neck:      Thyroid: No thyroid mass or thyromegaly. Vascular: No JVD. Trachea: Trachea normal. No tracheal deviation. Cardiovascular:      Rate and Rhythm: Normal rate and regular rhythm. Pulses: Normal pulses. Heart sounds: Normal heart sounds. Pulmonary:      Effort: Pulmonary effort is normal. No respiratory distress. Breath sounds: Normal breath sounds. No wheezing or rales. Chest:      Chest wall: No tenderness. Abdominal:      General: Bowel sounds are normal. There is no distension. Palpations: Abdomen is soft. There is no mass. Tenderness: There is no abdominal tenderness. There is no guarding. Musculoskeletal:         General: No tenderness or deformity. Cervical back: Normal range of motion and neck supple. Comments: Range of motion within normal limits x4 extremities   Skin:     General: Skin is warm. Findings: No bruising, erythema or rash. Neurological:      Mental Status: She is alert and oriented to person, place, and time. Cranial Nerves: No cranial nerve deficit. Coordination: Coordination normal.   Psychiatric:         Behavior: Behavior normal.         Thought Content: Thought content normal.       Labs:  Lab Results   Component Value Date    WBC 5.94 08/03/2022    HGB 10.8 (L) 08/03/2022    HCT 34.9 08/03/2022    MCV 94.1 08/03/2022     08/03/2022     Lab Results   Component Value Date    NEUTROABS 4.29 08/03/2022         ASSESSMENT/PLAN:      1. Chronic ITP (idiopathic thrombocytopenic purpura), continues to have an excellent response with Promacta. Continues to take Promacta 12.5 mg daily since January 2020 and has maintained a platelet count >130,353.   If platelet count were to become greater than 400,000 would consider discontinuing Promacta at that time, her platelet count waxes and wanes ranging from 201,000-311,000 over the past 8 months. Platelet count of 959,890 today.      -Continue Promacta 12.5 mg daily    2. Iron deficiency anemia secondary to inadequate dietary iron intake. Hemoglobin improved to 10.8 from 9.9. Reports compliant with ferrous sulfate 325 mg once daily. Iron saturation 18% and a ferritin of 286 on 4/6/2022. Denies any bleeding to include melena, epistaxis, hemoptysis, hematuria or hematochezia. 04/06/2022 Serology results  Iron 56  TIBC 311  Saturation 18%  Ferritin 286    -Continue ferrous sulfate 325 mg p.o. daily  -CMP, iron panel, and ferritin today    3. Chronic fatigue, grade 1, no change from previous evaluation. I discussed all of the above findings included in the assessment and plan with the patient and the patient is in agreement to move forward with current recommendations/treatment. I have addressed all of their questions and concerns that were verbalized. FOLLOW UP:   CBC in 2 months, and follow-up appointment given for 4 months sooner if needed  Continue to follow with other medical providers as recommended  Labs at next visit: CBC, CMP, Iron panel, Ferritin    EMR Dragon/Transcription disclaimer:   Much of this encounter note is an electronic transcription/translation of spoken language to printed text. The electronic translation of spoken language may permit erroneous, or at times, nonsensical words or phrases to be inadvertently transcribed; although attempts have made to review the note for such errors, some may still exist.  Please excuse any unrecognized transcription errors and contact us if the air is unintelligible or needs documented correction. Also, portions of this note have been copied forward, however, changed to reflect the most current clinical status of this patient. Electronically signed by BETTINA Pleitez on 8/8/2022 at 11:30 AM  Leah PATRICIA am pre-charting as a registered nurse for BETTINA Isbell.

## 2022-08-03 ENCOUNTER — HOSPITAL ENCOUNTER (OUTPATIENT)
Dept: INFUSION THERAPY | Age: 77
Discharge: HOME | End: 2022-08-03
Payer: MEDICARE

## 2022-08-03 ENCOUNTER — OFFICE VISIT (OUTPATIENT)
Dept: HEMATOLOGY | Age: 77
End: 2022-08-03
Payer: MEDICARE

## 2022-08-03 VITALS
SYSTOLIC BLOOD PRESSURE: 136 MMHG | HEART RATE: 84 BPM | HEIGHT: 60 IN | BODY MASS INDEX: 19.16 KG/M2 | WEIGHT: 97.6 LBS | DIASTOLIC BLOOD PRESSURE: 72 MMHG | OXYGEN SATURATION: 97 %

## 2022-08-03 DIAGNOSIS — D69.3 CHRONIC ITP (IDIOPATHIC THROMBOCYTOPENIC PURPURA) (HCC): ICD-10-CM

## 2022-08-03 DIAGNOSIS — D50.8 IRON DEFICIENCY ANEMIA SECONDARY TO INADEQUATE DIETARY IRON INTAKE: Primary | ICD-10-CM

## 2022-08-03 DIAGNOSIS — R53.82 CHRONIC FATIGUE: ICD-10-CM

## 2022-08-03 DIAGNOSIS — D50.8 IRON DEFICIENCY ANEMIA SECONDARY TO INADEQUATE DIETARY IRON INTAKE: ICD-10-CM

## 2022-08-03 LAB
ALBUMIN SERPL-MCNC: 5.1 G/DL (ref 3.5–5.2)
ALP BLD-CCNC: 70 U/L (ref 35–104)
ALT SERPL-CCNC: 13 U/L (ref 9–52)
ANION GAP SERPL CALCULATED.3IONS-SCNC: 11 MMOL/L (ref 7–19)
AST SERPL-CCNC: 24 U/L (ref 14–36)
BASOPHILS ABSOLUTE: 0.04 K/UL (ref 0.01–0.08)
BASOPHILS RELATIVE PERCENT: 0.7 % (ref 0.1–1.2)
BILIRUB SERPL-MCNC: 0.4 MG/DL (ref 0.2–1.3)
BUN BLDV-MCNC: 12 MG/DL (ref 7–17)
CALCIUM SERPL-MCNC: 10 MG/DL (ref 8.4–10.2)
CHLORIDE BLD-SCNC: 98 MMOL/L (ref 98–111)
CO2: 27 MMOL/L (ref 22–29)
CREAT SERPL-MCNC: 0.8 MG/DL (ref 0.5–1)
EOSINOPHILS ABSOLUTE: 0.15 K/UL (ref 0.04–0.54)
EOSINOPHILS RELATIVE PERCENT: 2.5 % (ref 0.7–7)
FERRITIN: 179.4 NG/ML (ref 13–150)
GFR NON-AFRICAN AMERICAN: >60
GLOBULIN: 2.2 G/DL
GLUCOSE BLD-MCNC: 101 MG/DL (ref 74–106)
HCT VFR BLD CALC: 34.9 % (ref 34.1–44.9)
HEMOGLOBIN: 10.8 G/DL (ref 11.2–15.7)
IRON SATURATION: 18 % (ref 14–50)
IRON: 48 UG/DL (ref 37–145)
LYMPHOCYTES ABSOLUTE: 0.86 K/UL (ref 1.18–3.74)
LYMPHOCYTES RELATIVE PERCENT: 14.5 % (ref 19.3–53.1)
MCH RBC QN AUTO: 29.1 PG (ref 25.6–32.2)
MCHC RBC AUTO-ENTMCNC: 30.9 G/DL (ref 32.3–35.5)
MCV RBC AUTO: 94.1 FL (ref 79.4–94.8)
MONOCYTES ABSOLUTE: 0.53 K/UL (ref 0.24–0.82)
MONOCYTES RELATIVE PERCENT: 8.9 % (ref 4.7–12.5)
NEUTROPHILS ABSOLUTE: 4.29 K/UL (ref 1.56–6.13)
NEUTROPHILS RELATIVE PERCENT: 72.2 % (ref 34–71.1)
PDW BLD-RTO: 12.8 % (ref 11.7–14.4)
PLATELET # BLD: 201 K/UL (ref 182–369)
PMV BLD AUTO: 10.5 FL (ref 7.4–10.4)
POTASSIUM SERPL-SCNC: 3.8 MMOL/L (ref 3.5–5.1)
RBC # BLD: 3.71 M/UL (ref 3.93–5.22)
SODIUM BLD-SCNC: 136 MMOL/L (ref 137–145)
TOTAL IRON BINDING CAPACITY: 272 UG/DL (ref 250–400)
TOTAL PROTEIN: 7.3 G/DL (ref 6.3–8.2)
WBC # BLD: 5.94 K/UL (ref 3.98–10.04)

## 2022-08-03 PROCEDURE — 99214 OFFICE O/P EST MOD 30 MIN: CPT | Performed by: NURSE PRACTITIONER

## 2022-08-03 PROCEDURE — G8420 CALC BMI NORM PARAMETERS: HCPCS | Performed by: NURSE PRACTITIONER

## 2022-08-03 PROCEDURE — 85025 COMPLETE CBC W/AUTO DIFF WBC: CPT

## 2022-08-03 PROCEDURE — G8400 PT W/DXA NO RESULTS DOC: HCPCS | Performed by: NURSE PRACTITIONER

## 2022-08-03 PROCEDURE — G8427 DOCREV CUR MEDS BY ELIG CLIN: HCPCS | Performed by: NURSE PRACTITIONER

## 2022-08-03 PROCEDURE — 80053 COMPREHEN METABOLIC PANEL: CPT

## 2022-08-03 PROCEDURE — 1036F TOBACCO NON-USER: CPT | Performed by: NURSE PRACTITIONER

## 2022-08-03 PROCEDURE — 99212 OFFICE O/P EST SF 10 MIN: CPT

## 2022-08-03 PROCEDURE — 36415 COLL VENOUS BLD VENIPUNCTURE: CPT

## 2022-08-03 PROCEDURE — 1090F PRES/ABSN URINE INCON ASSESS: CPT | Performed by: NURSE PRACTITIONER

## 2022-08-03 PROCEDURE — 1123F ACP DISCUSS/DSCN MKR DOCD: CPT | Performed by: NURSE PRACTITIONER

## 2022-08-03 PROCEDURE — 3017F COLORECTAL CA SCREEN DOC REV: CPT | Performed by: NURSE PRACTITIONER

## 2022-08-03 RX ORDER — LISINOPRIL 10 MG/1
10 TABLET ORAL DAILY
COMMUNITY

## 2022-09-28 ENCOUNTER — HOSPITAL ENCOUNTER (OUTPATIENT)
Dept: INFUSION THERAPY | Age: 77
Discharge: HOME | End: 2022-09-28
Payer: MEDICARE

## 2022-09-28 DIAGNOSIS — D69.3 CHRONIC ITP (IDIOPATHIC THROMBOCYTOPENIC PURPURA) (HCC): ICD-10-CM

## 2022-09-28 LAB
BASOPHILS ABSOLUTE: 0.04 K/UL (ref 0.01–0.08)
BASOPHILS RELATIVE PERCENT: 0.6 % (ref 0.1–1.2)
EOSINOPHILS ABSOLUTE: 0.17 K/UL (ref 0.04–0.54)
EOSINOPHILS RELATIVE PERCENT: 2.4 % (ref 0.7–7)
HCT VFR BLD CALC: 36.7 % (ref 34.1–44.9)
HEMOGLOBIN: 11.6 G/DL (ref 11.2–15.7)
LYMPHOCYTES ABSOLUTE: 1.15 K/UL (ref 1.18–3.74)
LYMPHOCYTES RELATIVE PERCENT: 16.3 % (ref 19.3–53.1)
MCH RBC QN AUTO: 30 PG (ref 25.6–32.2)
MCHC RBC AUTO-ENTMCNC: 31.6 G/DL (ref 32.3–35.5)
MCV RBC AUTO: 94.8 FL (ref 79.4–94.8)
MONOCYTES ABSOLUTE: 0.62 K/UL (ref 0.24–0.82)
MONOCYTES RELATIVE PERCENT: 8.8 % (ref 4.7–12.5)
NEUTROPHILS ABSOLUTE: 5.06 K/UL (ref 1.56–6.13)
NEUTROPHILS RELATIVE PERCENT: 71.6 % (ref 34–71.1)
PDW BLD-RTO: 13 % (ref 11.7–14.4)
PLATELET # BLD: 285 K/UL (ref 182–369)
PMV BLD AUTO: 9.7 FL (ref 7.4–10.4)
RBC # BLD: 3.87 M/UL (ref 3.93–5.22)
WBC # BLD: 7.06 K/UL (ref 3.98–10.04)

## 2022-09-28 PROCEDURE — 85025 COMPLETE CBC W/AUTO DIFF WBC: CPT

## 2022-11-14 DIAGNOSIS — D50.8 IRON DEFICIENCY ANEMIA SECONDARY TO INADEQUATE DIETARY IRON INTAKE: ICD-10-CM

## 2022-11-14 RX ORDER — FERROUS SULFATE 325(65) MG
325 TABLET ORAL 2 TIMES DAILY
Qty: 60 TABLET | Refills: 5 | Status: SHIPPED | OUTPATIENT
Start: 2022-11-14

## 2022-11-23 ENCOUNTER — OFFICE VISIT (OUTPATIENT)
Dept: UROLOGY | Age: 77
End: 2022-11-23
Payer: MEDICARE

## 2022-11-23 VITALS — BODY MASS INDEX: 19.44 KG/M2 | HEIGHT: 60 IN | TEMPERATURE: 97.9 F | WEIGHT: 99 LBS

## 2022-11-23 DIAGNOSIS — N81.11 CYSTOCELE, MIDLINE: Primary | ICD-10-CM

## 2022-11-23 LAB
BACTERIA URINE, POC: 0
BILIRUBIN URINE: 0 MG/DL
BLOOD, URINE: POSITIVE
CASTS URINE, POC: 0
CLARITY: CLEAR
COLOR: YELLOW
CRYSTALS URINE, POC: 0
EPI CELLS URINE, POC: 0
GLUCOSE URINE: NORMAL
KETONES, URINE: NEGATIVE
LEUKOCYTE EST, POC: NORMAL
NITRITE, URINE: NEGATIVE
PH UA: 5.5 (ref 4.5–8)
PROTEIN UA: NEGATIVE
RBC URINE, POC: 1
SPECIFIC GRAVITY UA: 1.01 (ref 1–1.03)
UROBILINOGEN, URINE: NORMAL
WBC URINE, POC: 4
YEAST URINE, POC: 0

## 2022-11-23 PROCEDURE — G8420 CALC BMI NORM PARAMETERS: HCPCS | Performed by: NURSE PRACTITIONER

## 2022-11-23 PROCEDURE — 1123F ACP DISCUSS/DSCN MKR DOCD: CPT | Performed by: NURSE PRACTITIONER

## 2022-11-23 PROCEDURE — 1090F PRES/ABSN URINE INCON ASSESS: CPT | Performed by: NURSE PRACTITIONER

## 2022-11-23 PROCEDURE — 81001 URINALYSIS AUTO W/SCOPE: CPT | Performed by: NURSE PRACTITIONER

## 2022-11-23 PROCEDURE — G8427 DOCREV CUR MEDS BY ELIG CLIN: HCPCS | Performed by: NURSE PRACTITIONER

## 2022-11-23 PROCEDURE — G8400 PT W/DXA NO RESULTS DOC: HCPCS | Performed by: NURSE PRACTITIONER

## 2022-11-23 PROCEDURE — 1036F TOBACCO NON-USER: CPT | Performed by: NURSE PRACTITIONER

## 2022-11-23 PROCEDURE — 3017F COLORECTAL CA SCREEN DOC REV: CPT | Performed by: NURSE PRACTITIONER

## 2022-11-23 PROCEDURE — G8484 FLU IMMUNIZE NO ADMIN: HCPCS | Performed by: NURSE PRACTITIONER

## 2022-11-23 PROCEDURE — 99204 OFFICE O/P NEW MOD 45 MIN: CPT | Performed by: NURSE PRACTITIONER

## 2022-11-23 ASSESSMENT — ENCOUNTER SYMPTOMS
BACK PAIN: 1
ABDOMINAL DISTENTION: 0
NAUSEA: 0
VOMITING: 0
ABDOMINAL PAIN: 0

## 2022-11-23 NOTE — PROGRESS NOTES
Ab Richardson is a 78 y.o., female, New patient who presents today   Chief Complaint   Patient presents with    New Patient     I am here today for prolapsed bladder. HPI   Patient presents for evaluation of a worsening cystocele. She had previously been seen by Dr. Tayla Kern in 2016 with similar complaints of prolapse. Ultimately, she did undergo total vaginal hysterectomy with A&P repair in December of that year. Patient reports over the last several months feeling a bulge within her vagina. She describes this is the same feeling she felt prior to her initial repair in 2016. She denies any significant lower urinary tract symptoms. She denies any recurrent urinary tract infections. She does report the bulging is worse with bowel movements or straining. Patient reports she does have some lower back pain which she believes is associated with the prolapse. REVIEW OF SYSTEMS:  Review of Systems   Constitutional:  Negative for chills and fever. Gastrointestinal:  Negative for abdominal distention, abdominal pain, nausea and vomiting. Genitourinary:  Negative for difficulty urinating, dysuria, flank pain, frequency, hematuria and urgency. Musculoskeletal:  Positive for back pain. Negative for gait problem. Psychiatric/Behavioral:  Negative for agitation and confusion. PHYSICAL EXAM:  Temp 97.9 °F (36.6 °C) (Temporal)   Ht 5' (1.524 m)   Wt 99 lb (44.9 kg)   BMI 19.33 kg/m²   Physical Exam  Vitals and nursing note reviewed. Constitutional:       General: She is not in acute distress. Appearance: Normal appearance. She is not ill-appearing. Pulmonary:      Effort: Pulmonary effort is normal. No respiratory distress. Abdominal:      General: There is no distension. Tenderness: There is no abdominal tenderness. There is no right CVA tenderness or left CVA tenderness. Genitourinary:     Comments: Grade 2 cystocele. Noted within the introitus on initial inspection. Protrudes to but not through the vaginal opening with Valsalva. Neurological:      Mental Status: She is alert and oriented to person, place, and time. Mental status is at baseline. Psychiatric:         Mood and Affect: Mood normal.         Behavior: Behavior normal.       DATA:  Results for orders placed or performed in visit on 11/23/22   POCT Urinalysis Dipstick w/ Micro (Auto)   Result Value Ref Range    Color, UA Yellow     Clarity, UA Clear Clear    Glucose, Ur NEG     Bilirubin Urine 0 mg/dL    Ketones, Urine Negative     Specific Gravity, UA 1.015 1.005 - 1.030    Blood, Urine Positive     pH, UA 5.5 4.5 - 8.0    Protein, UA Negative Negative    Nitrite, Urine Negative     Leukocytes, UA SMALL     Urobilinogen, Urine Normal     RBC Urine, POC 1     WBC Urine, POC 4     Bacteria Urine, POC 0     yeast urine, poc 0     Casts Urine, POC 0     Epi Cells Urine, POC 0     crystals urine, poc 0        ASSESSMENT/PLAN  1. Cystocele, midline  Patient presents for evaluation of worsening cystocele. I did view the cystocele on exam today. I explained that typically an OB/GYN would complete the repair or maintenance with a pessary depending on which treatment avenue the patient would like to pursue. She believes that she would like to go with the less invasive option first.  She wishes for referral to be placed to [address] She is having no significant lower urinary tract symptoms. She will follow-up with us as needed. If she is well maintained with a pessary or cystocele repair and develops new lower urinary tract symptoms, certainly we could see her and treat for those.   - Andria TY/Lou FUENTES  - POCT Urinalysis Dipstick w/ Micro (Auto)      Orders Placed This Encounter   Procedures    Andria TY/Lou FUENTES     Referral Priority:   Routine     Referral Type:   Eval and Treat     Referral Reason:   Specialty Services Required     Requested Specialty:   Obstetrics & Gynecology     Number of Visits Requested:   1    POCT Urinalysis Dipstick w/ Micro (Auto)        Return if symptoms worsen or fail to improve. An electronic signature was used to authenticate this note. BETTINA YI - CNP    All information inputted into the note by the MA to include chief complaint, past medical history, past surgical history, medications, allergies, social and family history and review of systems has been reviewed and updated as needed by me. EMR Dragon/transcription disclaimer: Much of this document is electronic transcription/translation of spoken language to printed text. The electronic translation of spoken language may be erroneous or, at times, nonsensical words or phrases may be inadvertently transcribed.  Although I have reviewed the document for such errors, some may still exist.

## 2022-11-30 ENCOUNTER — HOSPITAL ENCOUNTER (OUTPATIENT)
Dept: INFUSION THERAPY | Age: 77
Discharge: HOME | End: 2022-11-30
Payer: MEDICARE

## 2022-11-30 ENCOUNTER — OFFICE VISIT (OUTPATIENT)
Dept: HEMATOLOGY | Age: 77
End: 2022-11-30
Payer: MEDICARE

## 2022-11-30 VITALS
SYSTOLIC BLOOD PRESSURE: 170 MMHG | OXYGEN SATURATION: 99 % | HEIGHT: 60 IN | WEIGHT: 97.8 LBS | DIASTOLIC BLOOD PRESSURE: 72 MMHG | BODY MASS INDEX: 19.2 KG/M2 | HEART RATE: 77 BPM

## 2022-11-30 DIAGNOSIS — D69.3 CHRONIC ITP (IDIOPATHIC THROMBOCYTOPENIC PURPURA) (HCC): ICD-10-CM

## 2022-11-30 DIAGNOSIS — R53.82 CHRONIC FATIGUE: ICD-10-CM

## 2022-11-30 DIAGNOSIS — D50.8 IRON DEFICIENCY ANEMIA SECONDARY TO INADEQUATE DIETARY IRON INTAKE: Primary | ICD-10-CM

## 2022-11-30 DIAGNOSIS — D50.8 IRON DEFICIENCY ANEMIA SECONDARY TO INADEQUATE DIETARY IRON INTAKE: ICD-10-CM

## 2022-11-30 LAB
ALBUMIN SERPL-MCNC: 4.7 G/DL (ref 3.5–5.2)
ALP BLD-CCNC: 62 U/L (ref 35–104)
ALT SERPL-CCNC: 21 U/L (ref 9–52)
ANION GAP SERPL CALCULATED.3IONS-SCNC: 10 MMOL/L (ref 7–19)
AST SERPL-CCNC: 30 U/L (ref 14–36)
BASOPHILS ABSOLUTE: 0.04 K/UL (ref 0.01–0.08)
BASOPHILS RELATIVE PERCENT: 0.8 % (ref 0.1–1.2)
BILIRUB SERPL-MCNC: 0.4 MG/DL (ref 0.2–1.3)
BUN BLDV-MCNC: 20 MG/DL (ref 7–17)
CALCIUM SERPL-MCNC: 9.3 MG/DL (ref 8.4–10.2)
CHLORIDE BLD-SCNC: 97 MMOL/L (ref 98–111)
CO2: 27 MMOL/L (ref 22–29)
CREAT SERPL-MCNC: 0.8 MG/DL (ref 0.5–1)
EOSINOPHILS ABSOLUTE: 0.15 K/UL (ref 0.04–0.54)
EOSINOPHILS RELATIVE PERCENT: 3 % (ref 0.7–7)
FERRITIN: 220.5 NG/ML (ref 13–150)
GFR SERPL CREATININE-BSD FRML MDRD: >60 ML/MIN/{1.73_M2}
GLOBULIN: 2.8 G/DL
GLUCOSE BLD-MCNC: 101 MG/DL (ref 74–106)
HCT VFR BLD CALC: 32.9 % (ref 34.1–44.9)
HEMOGLOBIN: 10.6 G/DL (ref 11.2–15.7)
IRON SATURATION: 24 % (ref 14–50)
IRON: 69 UG/DL (ref 37–145)
LYMPHOCYTES ABSOLUTE: 1.12 K/UL (ref 1.18–3.74)
LYMPHOCYTES RELATIVE PERCENT: 22.5 % (ref 19.3–53.1)
MCH RBC QN AUTO: 30.5 PG (ref 25.6–32.2)
MCHC RBC AUTO-ENTMCNC: 32.2 G/DL (ref 32.3–35.5)
MCV RBC AUTO: 94.5 FL (ref 79.4–94.8)
MONOCYTES ABSOLUTE: 0.48 K/UL (ref 0.24–0.82)
MONOCYTES RELATIVE PERCENT: 9.7 % (ref 4.7–12.5)
NEUTROPHILS ABSOLUTE: 3.17 K/UL (ref 1.56–6.13)
NEUTROPHILS RELATIVE PERCENT: 63.8 % (ref 34–71.1)
PDW BLD-RTO: 12.3 % (ref 11.7–14.4)
PLATELET # BLD: 249 K/UL (ref 182–369)
PMV BLD AUTO: 9.8 FL (ref 7.4–10.4)
POTASSIUM SERPL-SCNC: 4.6 MMOL/L (ref 3.5–5.1)
RBC # BLD: 3.48 M/UL (ref 3.93–5.22)
SODIUM BLD-SCNC: 134 MMOL/L (ref 137–145)
TOTAL IRON BINDING CAPACITY: 284 UG/DL (ref 250–400)
TOTAL PROTEIN: 7 G/DL (ref 6.3–8.2)
WBC # BLD: 4.97 K/UL (ref 3.98–10.04)

## 2022-11-30 PROCEDURE — 85025 COMPLETE CBC W/AUTO DIFF WBC: CPT

## 2022-11-30 PROCEDURE — 1123F ACP DISCUSS/DSCN MKR DOCD: CPT | Performed by: NURSE PRACTITIONER

## 2022-11-30 PROCEDURE — 99214 OFFICE O/P EST MOD 30 MIN: CPT | Performed by: NURSE PRACTITIONER

## 2022-11-30 PROCEDURE — G8484 FLU IMMUNIZE NO ADMIN: HCPCS | Performed by: NURSE PRACTITIONER

## 2022-11-30 PROCEDURE — G8400 PT W/DXA NO RESULTS DOC: HCPCS | Performed by: NURSE PRACTITIONER

## 2022-11-30 PROCEDURE — 1036F TOBACCO NON-USER: CPT | Performed by: NURSE PRACTITIONER

## 2022-11-30 PROCEDURE — G8427 DOCREV CUR MEDS BY ELIG CLIN: HCPCS | Performed by: NURSE PRACTITIONER

## 2022-11-30 PROCEDURE — G8420 CALC BMI NORM PARAMETERS: HCPCS | Performed by: NURSE PRACTITIONER

## 2022-11-30 PROCEDURE — 36415 COLL VENOUS BLD VENIPUNCTURE: CPT

## 2022-11-30 PROCEDURE — 3017F COLORECTAL CA SCREEN DOC REV: CPT | Performed by: NURSE PRACTITIONER

## 2022-11-30 PROCEDURE — 1090F PRES/ABSN URINE INCON ASSESS: CPT | Performed by: NURSE PRACTITIONER

## 2022-11-30 PROCEDURE — 99212 OFFICE O/P EST SF 10 MIN: CPT

## 2022-11-30 PROCEDURE — 80053 COMPREHEN METABOLIC PANEL: CPT

## 2022-11-30 NOTE — PROGRESS NOTES
Progress Note      Pt Name: Washington  YOB: 1945  MRN: [id number]    Date of evaluation: 11/30/2022  History Obtained From:  patient, electronic medical record    CHIEF COMPLAINT:    Chief Complaint   Patient presents with    Follow-up     Iron deficiency anemia secondary to inadequate dietary iron intake    Other     ITP, managed with Promacta     HISTORY OF PRESENT ILLNESS:    David Garcia is a 78 y.o.  female who is followed for ITP and iron deficiency anemia. Current recommendation is to continue with Promacta 12.5 mg daily and ferrous sulfate 325 mg once daily. [place] has continued to have an excellent response to [facility] with the decreased dosing of 12.5 mg, initially required 75 mg daily. She returns today in scheduled follow-up for evaluation, side effect monitoring, lab monitoring and further recommendations. She reports being compliant with the Promacta and ferrous sulfate, tolerating both without significant difficulty. Today's clinic visit to include physical assessment, review of systems, any lab or radiographic findings that were available and plan of care are documented below. Up-to-Date  Promacta: Information obtained from     HEMATOLOGY HISTORY:   Diagnosis: Thrombocytopenia with differential diagnosis of ITP    Sjogrens  Iron deficiency anemia    Treatment summary:  Initiation of prednisone at 50 mg daily on 4/27/2017 and completed on 6/22/2017 after weekly dose titration of 10 mg weekly. Prednisone 15 mg daily ×14 days, completed on 11/18/2017 for planned colonoscopy for positive occult stools. 5/2/2018 -initiation of Promacta 50 mg by mouth daily.  Dose increased to 75 mg daily on 10/3/2018.   6/21/2019- decreased Promacta dosing to 50 mg   11/25/2019-decrease Promacta to 25 mg daily  1/21/2020-decrease Promacta 12.5 mg daily  7/15/2020-ferrous sulfate 325 mg 1 tablet p.o. x7 days then increase to twice daily if tolerated, intolerant to twice daily due to nausea. Tolerating once daily. HEMATOLOGY HISTORY:  [place] was seen in initial hematology consultation on 4/27/2017 for new onset of thrombocytopenia and anemia in referral from Dr. Feroz Jacobs. [place] has a history of lupus and a history of acute blood loss secondary to a vaginal hysterectomy with anterior posterior vaginal repair, and cystoscopy on 12/14/2016. She is followed by Dr. Gina Padilla in [place] for her lupus and denies any significant flareups. She denies any previous history of thrombocytopenia or bleeding tendencies. Review of her present medication list did not reveal any medications that cause significant thrombocytopenia and she denies drinking any tonic water. CBC HISTORY:  12/19/2016 - WBC 8.36, hemoglobin 7.4, hematocrit 20.5, MCV 79.2 and a platelet count of 553,172.   12/20/2016 - WBC 4.67, neutrophils 80.9%, hemoglobin 10.2, hematocrit 29, MCV 82.6 and a platelet count of 107,965.   12/22/2016 - WBC 6.4, neutrophils 70.8%, hemoglobin 10, hematocrit 29.8, MCV 84.7, and a platelet count 837,191.   12/28/2016 - WBC 6.6, neutrophils 66.2%, hemoglobin 12.3, hematocrit 37.1, MCV 86, and a platelet count of 70,381.   4/27/2017- WBC 5.17, neutrophils 59%, hemoglobin 10.3, hematocrit 33.3, MCV 92.5 and a platelet count of 19,708. Normocytic, normochromic anemia- with a history of acute blood loss (12/2016). Hemoglobin has stabilized. Serum studies on 4/27/2017:  Iron 50   TIBC 276   Iron saturation 18%   Ferritin 466 (H)   Vitamin B12 426   Folate 17.9   Haptoglobin 116   Reticulocyte percent 1.3    Repeat serology studies on 9/21/2017:  Iron 66   TIBC 283   Iron saturation 23%   Ferritin 263   Vitamin B12 448   Folate 11.9   Referral potent 9.0   Reticulocyte count 1.2   IgG 625   IgA 204   IgM 204    10/6/2017 occult stool positive ×1. Colonoscopy on 11/17/2017 by Dr. Arian Martinez revealed diverticulosis in the sigmoid and descending colon.  Localized mild inflammation was found in the descending colon secondary to ischemic colitis. Serology studies on 8/13/2018:  Iron 163   TIBC 343   Iron saturation 48%   Ferritin 269   Vitamin B12 319   Folate 6.0   Erythropoietin 8.3   Reticulocyte count 1%   Creatinine 1.06   GFR 53       Serology studies on 9/24/2019:  Iron 94  TIBC 362  Iron saturation 26%  Ferritin 52.5  Folate 5.52  Vitamin B12 270      Serology studies on 6/3/2020:  iron of 80  TIBC 419  Iron saturation 19%  Ferritin 33.2  Reticulocyte count of 1.5%  Creatinine 1.0/GFR 54    Serology studies on 9/2/2020:  Ferritin 48.4  Iron 82  Iron saturation 21%  TIBC 400  Creatinine 1.0/GFR 54    Thrombocytopenia- no known identifiable cause of onset, differential diagnosis of ITP. Review of medications did not reveal any known medications that induce thrombocytopenia. [place] denied any bleeding tendencies or significant ecchymosis. Serum studies on 4/27/2017:     Anticardiolipin IgA and IgG <9, IgM 12   PTT 24, PT 9.7, and INR 0.9   Rheumatoid factor 20.8 (H)   MANUEL positive   CRP <0.3   ESR 4   hepatitis C antibody <0.1   HIV negative   M spike not observed     Ultrasound of the spleen on 4/28/2017 revealed no abnormalities and spleen measures 8.8 x 3.2 x 3.1 cm. Promacta 50 mg daily initiated on 5/2/2018. The goal is to achieve and maintain a platelet count equal to or greater than 50,000, to reduce the risk of bleeding. Promacta will be initiated at 50 mg daily. The Promacta, dose can be increased after 2 weeks of dosing with a max dosing of 75 mg daily. Promacta increased to 75 mg daily on 10/3/2018 for a platelet count of 20,057. Promacta decreased to 50 mg daily on 6/20/2019 for platelet count being > 200,000, platelets 706,303. History of lupus: [place] is followed by Dr. Cristobal Grimaldo in [place]. She denies any significant joint pain, fatigue, febrile illness, or skin lesions, these symptoms are common with lupus.  She reports a hiostory of hair loss and this symptom can be associated with lupus. She denies any recent flareups or lab testing related to her lupus diagnosis. [place] was evaluated by Dr. Nabor Soto on 5/26/2017 and he is suggesting that [place] has a differential diagnosis of Sjogern's. Serum studies on 4/27/2017  ds- DNA antibody 1   Aguilar antibodies <0.2   Sjogren's antibodies (SSA) > 8.0 (H)   Sjogren's antibodies (SSB) 5.8 (H)   Antichromatin antibodies <0.2   C4 20   Centromere B antibody <0.2    Bone marrow aspiration biopsy on 4/17/2018 documented low normal cellular bone marrow for age (~20-30%) with trilineage hematopoiesis and a blast count of ~1%. Many small lymphoid aggregates with mixed CD3+ T and CD20+ B-cells seen on clot section (likely representing underlying autoimmune disease). No morphological or cytogenic (FISH) evidence of a myelodysplastic syndrome. No diagnostic evidence of lymphoroliferative disorder, granulomas or metastatic malignancy. Normal female karyotype. Thrombocytopenia along with adequate number of megakaryocytes is consistent with peripheral destruction/sequestration of platelets. Dr. Flex Montano has recommended initiation of Promacta. The goal is to achieve and maintain a platelet count equal to or greater than 50,000, to reduce the risk of bleeding. Promacta will be initiated at 50 mg daily. The Promacta, dose can be increased after 2 weeks of dosing with a max dosing of 75 mg daily. [place] is knowledgeable of the possible side effects to include but not limited to fatigue, headache, insomnia, pruritus, anemia, and hepatotoxicity.     Serology studies 4/1/2021  Iron 72   TIBC 400  Iron saturation 18%  Ferritin 76.4  Folate 6.7  Vitamin B-55=348  Reticulocytes 1.3%      Serology studies 08/02/2021  Iron 64  TIBC 302  Saturation 21%  Ferritin 170    12/02/2021 Serology results  Iron 46  TIBC 270  Saturation 17%  Ferritin 110    04/06/2022 Serology results  Iron 56  TIBC 311  Saturation 18%  Ferritin 286    2022 Serology results  Iron 48  TIBC 272  Saturation 18%  Ferritin 179.4    Age-appropriate health screenin2017 Colonoscopy at [facility] per Dr. Eagle Marrero for positive guiac revealed diverticulosis in the sigmoid and descending colon. Localized mild inflammation was found in the descending colon secondary to ischemic colitis. 2019 Bilateral mammogram at [address] [place] [address] documented there are scattered parenchymal densities, pattern B. There are postbiopsy changes in the left lateral breast, with stable lobularity of the breast parenchyma. No dominant mass or architectural distortion is identified. There are no suspicious calcifications, skin thickening or nipple retraction. There is mild inversion of the nipples laterally as before. Vascular calcification is are present. There is no significant interval change. Stable mammograms, no suspicious features are identified. No bone mineral density on file. Past Medical History:    Past Medical History:   Diagnosis Date    Arrhythmia     CAD (coronary artery disease)     Hyperlipidemia     Hypertension     Immune thrombocytopenic purpura ([facility] [address] 2019    Sleep apnea     Systemic lupus erythematosus ([facility] [address] 2019    Thrombocytopenia, unspecified ([facility] [address] 2019       Past Surgical History:    Past Surgical History:   Procedure Laterality Date    APPENDECTOMY      BREAST SURGERY      FOOT SURGERY      HYSTERECTOMY (CERVIX STATUS UNKNOWN)      TUBAL LIGATION         Current Medications:    Current Outpatient Medications   Medication Sig Dispense Refill    ferrous sulfate (IRON 325) 325 (65 Fe) MG tablet Take 1 tablet by mouth 2 times daily 60 tablet 5    lisinopril (PRINIVIL;ZESTRIL) 10 MG tablet Take 10 mg by mouth in the morning.       eltrombopag (PROMACTA) 12.5 MG TABS tablet Take 1 tablet by mouth daily 30 tablet 11    metoprolol succinate (TOPROL XL) 50 MG extended release tablet Take 50 mg by mouth daily hydroxychloroquine (PLAQUENIL) 200 MG tablet Take by mouth daily       No current facility-administered medications for this visit. Allergies: Allergies   Allergen Reactions    Ondansetron Hcl     Oxycodone-Aspirin Other (See Comments)       Social History:    Social History     Tobacco Use    Smoking status: Never    Smokeless tobacco: Never   Substance Use Topics    Alcohol use: Not Currently    Drug use: Never       Family History:   Family History   Problem Relation Age of Onset    Heart Disease Mother     Cancer Sister     Cancer Brother        Vitals:  Vitals:    11/30/22 1010   BP: (!) 170/72   Pulse: 77   SpO2: 99%   Weight: 97 lb 12.8 oz (44.4 kg)   Height: 5' (1.524 m)        Subjective   REVIEW OF SYSTEMS:   Review of Systems   Constitutional:  Positive for fatigue. Negative for chills, diaphoresis and fever. HENT: Negative. Negative for congestion, ear pain, hearing loss, nosebleeds, sore throat and tinnitus. Eyes: Negative. Negative for pain, discharge and redness. Respiratory: Negative. Negative for cough, shortness of breath and wheezing. Cardiovascular: Negative. Negative for chest pain, palpitations and leg swelling. Gastrointestinal: Negative. Negative for abdominal pain, blood in stool, constipation, diarrhea, nausea and vomiting. Endocrine: Negative for polydipsia. Genitourinary:  Negative for dysuria, flank pain, frequency, hematuria and urgency. Musculoskeletal: Negative. Negative for back pain, myalgias and neck pain. Skin: Negative. Negative for rash. Neurological: Negative. Negative for dizziness, tremors, seizures, weakness and headaches. Hematological:  Does not bruise/bleed easily. Psychiatric/Behavioral: Negative. The patient is not nervous/anxious. Objective   PHYSICAL EXAM:  Physical Exam  Vitals reviewed. Constitutional:       General: She is not in acute distress. Appearance: She is well-developed.    HENT:      Head: Normocephalic and atraumatic. Mouth/Throat:      Pharynx: Uvula midline. Tonsils: No tonsillar exudate. Eyes:      General: Lids are normal.      Conjunctiva/sclera: Conjunctivae normal.      Pupils: Pupils are equal, round, and reactive to light. Neck:      Thyroid: No thyroid mass or thyromegaly. Vascular: No JVD. Trachea: Trachea normal. No tracheal deviation. Cardiovascular:      Rate and Rhythm: Normal rate and regular rhythm. Pulses: Normal pulses. Heart sounds: Normal heart sounds. Pulmonary:      Effort: Pulmonary effort is normal. No respiratory distress. Breath sounds: Normal breath sounds. No wheezing or rales. Chest:      Chest wall: No tenderness. Abdominal:      General: Bowel sounds are normal. There is no distension. Palpations: Abdomen is soft. There is no mass. Tenderness: There is no abdominal tenderness. There is no guarding. Musculoskeletal:         General: No tenderness or deformity. Cervical back: Normal range of motion and neck supple. Comments: Range of motion within normal limits x4 extremities   Skin:     General: Skin is warm. Findings: No bruising, erythema or rash. Neurological:      Mental Status: She is alert and oriented to person, place, and time. Cranial Nerves: No cranial nerve deficit. Coordination: Coordination normal.   Psychiatric:         Behavior: Behavior normal.         Thought Content: Thought content normal.       Labs:  Lab Results   Component Value Date    WBC 4.97 11/30/2022    HGB 10.6 (L) 11/30/2022    HCT 32.9 (L) 11/30/2022    MCV 94.5 11/30/2022     11/30/2022     Lab Results   Component Value Date    NEUTROABS 3.17 11/30/2022         ASSESSMENT/PLAN:      1. Chronic ITP (idiopathic thrombocytopenic purpura), continues to have an excellent response with Promacta.   Recommend continuing Promacta 12.5 mg daily, no dosing adjustment needed based upon platelet count remaining >150,000. If platelet count were to become greater than 400,000 would consider discontinuing Promacta at that time. Platelet count of 149,517 today. Reports compliant with daily Promacta dosing and tolerating without difficulty. Denies any bleeding to include melena, epistaxis, hemoptysis, hematuria or hematochezia. -Continue Promacta 12.5 mg daily, continues to have a positive response with platelet count remaining >150,000.    2. Iron deficiency anemia secondary to inadequate dietary iron intake. Currently taking ferrous sulfate 325 mg once daily, hemoglobin is stable at 10.6. Iron substrates stable on: 08/03/2022 Serology results  Iron 48  TIBC 272  Saturation 18%  Ferritin 179.4    -Continue ferrous sulfate 325 mg p.o. daily, continues to have a positive response and tolerating without difficulty  -CMP, iron panel, and ferritin today    3. Chronic fatigue, grade 1, no change from previous evaluation    I discussed all of the above findings included in the assessment and plan with the patient and the patient is in agreement to move forward with current recommendations/treatment. I have addressed all of their questions and concerns that were verbalized. FOLLOW UP:   CBC in 2 months, and follow-up appointment given for 4 months sooner if needed  Continue to follow with other medical providers as recommended  Labs at next visit: CBC, CMP, Iron panel, Ferritin    EMR Dragon/Transcription disclaimer:   Much of this encounter note is an electronic transcription/translation of spoken language to printed text. The electronic translation of spoken language may permit erroneous, or at times, nonsensical words or phrases to be inadvertently transcribed; although attempts have made to review the note for such errors, some may still exist.  Please excuse any unrecognized transcription errors and contact us if the error is unintelligible or needs documented correction.   Also, portions of this note have been copied forward, however, changed to reflect the most current clinical status of this patient. Electronically signed by BETTINA Dwyer on 12/5/2022 at 9:07 AM  Jillian PATRICIA am pre-charting as a registered nurse for BETTINA Mcallister.

## 2022-12-05 ASSESSMENT — ENCOUNTER SYMPTOMS
EYE PAIN: 0
RESPIRATORY NEGATIVE: 1
DIARRHEA: 0
ABDOMINAL PAIN: 0
BLOOD IN STOOL: 0
EYE DISCHARGE: 0
COUGH: 0
NAUSEA: 0
CONSTIPATION: 0
EYES NEGATIVE: 1
WHEEZING: 0
VOMITING: 0
BACK PAIN: 0
EYE REDNESS: 0
SORE THROAT: 0
GASTROINTESTINAL NEGATIVE: 1
SHORTNESS OF BREATH: 0

## 2022-12-08 DIAGNOSIS — R00.2 HEART PALPITATIONS: ICD-10-CM

## 2022-12-08 NOTE — TELEPHONE ENCOUNTER
Caller: [place]    Relationship: SELF    Best call back number: [phone]    Requested Prescriptions:   Requested Prescriptions     Pending Prescriptions Disp Refills   • metoprolol succinate XL (TOPROL-XL) 50 MG 24 hr tablet 135 tablet 3     Sig: Take 1.5 tablets by mouth Daily.        Pharmacy where request should be sent:  [facility]     Additional details provided by patient: 90 DAYS PLEASE    Does the patient have less than a 3 day supply:  [x] Yes  [] No    Would you like a call back once the refill request has been completed: [x] Yes [] No    If the office needs to give you a call back, can they leave a voicemail: [x] Yes [] No    Cailin Rainey Rep   12/08/22 14:19 CST

## 2022-12-12 RX ORDER — METOPROLOL SUCCINATE 50 MG/1
75 TABLET, EXTENDED RELEASE ORAL DAILY
Qty: 135 TABLET | Refills: 3 | Status: SHIPPED | OUTPATIENT
Start: 2022-12-12 | End: 2023-02-14 | Stop reason: SDUPTHER

## 2023-01-30 ENCOUNTER — HOSPITAL ENCOUNTER (OUTPATIENT)
Dept: INFUSION THERAPY | Age: 78
Discharge: HOME | End: 2023-01-30
Payer: MEDICARE

## 2023-01-30 DIAGNOSIS — D69.3 CHRONIC ITP (IDIOPATHIC THROMBOCYTOPENIC PURPURA) (HCC): ICD-10-CM

## 2023-01-30 LAB
BASOPHILS ABSOLUTE: 0.05 K/UL (ref 0.01–0.08)
BASOPHILS RELATIVE PERCENT: 1.2 % (ref 0.1–1.2)
EOSINOPHILS ABSOLUTE: 0.13 K/UL (ref 0.04–0.54)
EOSINOPHILS RELATIVE PERCENT: 3.2 % (ref 0.7–7)
HCT VFR BLD CALC: 33.8 % (ref 34.1–44.9)
HEMOGLOBIN: 11 G/DL (ref 11.2–15.7)
LYMPHOCYTES ABSOLUTE: 1.11 K/UL (ref 1.18–3.74)
LYMPHOCYTES RELATIVE PERCENT: 27.5 % (ref 19.3–53.1)
MCH RBC QN AUTO: 30.6 PG (ref 25.6–32.2)
MCHC RBC AUTO-ENTMCNC: 32.5 G/DL (ref 32.3–35.5)
MCV RBC AUTO: 94.2 FL (ref 79.4–94.8)
MONOCYTES ABSOLUTE: 0.45 K/UL (ref 0.24–0.82)
MONOCYTES RELATIVE PERCENT: 11.2 % (ref 4.7–12.5)
NEUTROPHILS ABSOLUTE: 2.28 K/UL (ref 1.56–6.13)
NEUTROPHILS RELATIVE PERCENT: 56.7 % (ref 34–71.1)
PDW BLD-RTO: 12.5 % (ref 11.7–14.4)
PLATELET # BLD: 273 K/UL (ref 182–369)
PMV BLD AUTO: 10.2 FL (ref 7.4–10.4)
RBC # BLD: 3.59 M/UL (ref 3.93–5.22)
WBC # BLD: 4.03 K/UL (ref 3.98–10.04)

## 2023-01-30 PROCEDURE — 36415 COLL VENOUS BLD VENIPUNCTURE: CPT

## 2023-01-30 PROCEDURE — 85025 COMPLETE CBC W/AUTO DIFF WBC: CPT

## 2023-02-14 DIAGNOSIS — R00.2 HEART PALPITATIONS: ICD-10-CM

## 2023-02-14 RX ORDER — METOPROLOL SUCCINATE 50 MG/1
75 TABLET, EXTENDED RELEASE ORAL DAILY
Qty: 135 TABLET | Refills: 3 | Status: SHIPPED | OUTPATIENT
Start: 2023-02-14

## 2023-02-14 NOTE — TELEPHONE ENCOUNTER
Caller: Sarah Espinoza    Relationship: Self    Best call back number: [phone]    Requested Prescriptions:   Requested Prescriptions     Pending Prescriptions Disp Refills   • metoprolol succinate XL (TOPROL-XL) 50 MG 24 hr tablet 135 tablet 3     Sig: Take 1.5 tablets by mouth Daily.        Pharmacy where request should be sent: [place], [place] - [address] [facility] [phone] [facility] [phone] FX     Additional details provided by patient: PATIENT HAS APPROXIMATELY 2 WEEKS LEFT.     Does the patient have less than a 3 day supply:  [] Yes  [x] No      Cailin Phillip Rep   02/14/23 08:29 CST

## 2023-02-16 ENCOUNTER — OFFICE VISIT (OUTPATIENT)
Dept: OBGYN CLINIC | Age: 78
End: 2023-02-16
Payer: MEDICARE

## 2023-02-16 VITALS
HEART RATE: 69 BPM | BODY MASS INDEX: 19.63 KG/M2 | WEIGHT: 100 LBS | DIASTOLIC BLOOD PRESSURE: 75 MMHG | SYSTOLIC BLOOD PRESSURE: 188 MMHG | HEIGHT: 60 IN

## 2023-02-16 DIAGNOSIS — N81.11 CYSTOCELE, MIDLINE: ICD-10-CM

## 2023-02-16 DIAGNOSIS — Z76.89 ENCOUNTER TO ESTABLISH CARE: Primary | ICD-10-CM

## 2023-02-16 DIAGNOSIS — N81.10 VAGINAL PROLAPSE: ICD-10-CM

## 2023-02-16 PROCEDURE — 1090F PRES/ABSN URINE INCON ASSESS: CPT | Performed by: NURSE PRACTITIONER

## 2023-02-16 PROCEDURE — 1123F ACP DISCUSS/DSCN MKR DOCD: CPT | Performed by: NURSE PRACTITIONER

## 2023-02-16 PROCEDURE — G8427 DOCREV CUR MEDS BY ELIG CLIN: HCPCS | Performed by: NURSE PRACTITIONER

## 2023-02-16 PROCEDURE — 3017F COLORECTAL CA SCREEN DOC REV: CPT | Performed by: NURSE PRACTITIONER

## 2023-02-16 PROCEDURE — 1036F TOBACCO NON-USER: CPT | Performed by: NURSE PRACTITIONER

## 2023-02-16 PROCEDURE — 99203 OFFICE O/P NEW LOW 30 MIN: CPT | Performed by: NURSE PRACTITIONER

## 2023-02-16 PROCEDURE — G8400 PT W/DXA NO RESULTS DOC: HCPCS | Performed by: NURSE PRACTITIONER

## 2023-02-16 PROCEDURE — G8484 FLU IMMUNIZE NO ADMIN: HCPCS | Performed by: NURSE PRACTITIONER

## 2023-02-16 PROCEDURE — G8420 CALC BMI NORM PARAMETERS: HCPCS | Performed by: NURSE PRACTITIONER

## 2023-02-16 RX ORDER — ESTRADIOL 0.1 MG/G
1 CREAM VAGINAL
Qty: 1 EACH | Refills: 3 | Status: SHIPPED | OUTPATIENT
Start: 2023-02-17

## 2023-02-16 ASSESSMENT — ENCOUNTER SYMPTOMS
RESPIRATORY NEGATIVE: 1
CONSTIPATION: 0
ALLERGIC/IMMUNOLOGIC NEGATIVE: 1
DIARRHEA: 0
EYES NEGATIVE: 1
GASTROINTESTINAL NEGATIVE: 1

## 2023-02-16 NOTE — PROGRESS NOTES
Pt is here to establish care and for cystocele midline. She states 6 yrs ago had her hyst had bladder tied up in July when bathing felt bladder and has dropped.

## 2023-02-16 NOTE — PROGRESS NOTES
Una Hawthorne is a 78 y.o. female who presents today for her medical conditions/ complaints as noted below. Una Hawthorne is c/o of Establish Care and Bladder Problem (Cystocele midline )        HPI  New pt presents as referral from Urology for cystocele. Pt reports complete hysterectomy and her bladder \"tied up\" 6 years ago with Dr Matt Ambrocio. Feels like her bladder has dropped. Noticed in July of 2022- if she is up and moving around and lifting, she will have to push her bladder back up inside. Denies any pain. Feels like she empties her bladder normally. and takes Miralax for her bowels. 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 Patient has had a hysterectomy. No obstetric history on file. Past Medical History:   Diagnosis Date    Arrhythmia     CAD (coronary artery disease)     Hyperlipidemia     Hypertension     Immune thrombocytopenic purpura ([facility] [address] 7/26/2019    Sleep apnea     Systemic lupus erythematosus ([facility] [address] 7/26/2019    Thrombocytopenia, unspecified ([facility] [address] 7/26/2019     Past Surgical History:   Procedure Laterality Date    APPENDECTOMY      BREAST SURGERY      FOOT SURGERY      HYSTERECTOMY (CERVIX STATUS UNKNOWN)      TUBAL LIGATION       Family History   Problem Relation Age of Onset    Heart Disease Mother     Cancer Sister     Cancer Brother      Social History     Tobacco Use    Smoking status: Never    Smokeless tobacco: Never   Substance Use Topics    Alcohol use: Not Currently       Current Outpatient Medications   Medication Sig Dispense Refill    [START ON 2/17/2023] estradiol (ESTRACE VAGINAL) 0.1 MG/GM vaginal cream Place 1 g vaginally three times a week 1 each 3    ferrous sulfate (IRON 325) 325 (65 Fe) MG tablet Take 1 tablet by mouth 2 times daily 60 tablet 5    lisinopril (PRINIVIL;ZESTRIL) 10 MG tablet Take 10 mg by mouth in the morning.       eltrombopag (PROMACTA) 12.5 MG TABS tablet Take 1 tablet by mouth daily 30 tablet 11    metoprolol succinate (TOPROL XL) 50 MG extended release tablet Take 50 mg by mouth daily      hydroxychloroquine (PLAQUENIL) 200 MG tablet Take by mouth daily       No current facility-administered medications for this visit. Allergies   Allergen Reactions    Ondansetron Hcl     Oxycodone-Aspirin Other (See Comments)     Vitals:    02/16/23 1046   BP: (!) 188/75   Pulse: 69     Body mass index is 19.53 kg/m². Review of Systems   Constitutional: Negative. HENT: Negative. Eyes: Negative. Respiratory: Negative. Cardiovascular: Negative. Gastrointestinal: Negative. Negative for constipation and diarrhea. Endocrine: Negative. Genitourinary: Negative. Negative for frequency, menstrual problem, urgency and vaginal pain (bulging). Musculoskeletal: Negative. Skin: Negative. Allergic/Immunologic: Negative. Neurological: Negative. Hematological: Negative. Psychiatric/Behavioral: Negative. All other systems reviewed and are negative. Physical Exam  Vitals and nursing note reviewed. Constitutional:       Appearance: She is well-developed. HENT:      Head: Normocephalic. Right Ear: External ear normal.      Left Ear: External ear normal.      Nose: Nose normal.   Genitourinary:     General: Normal vulva. Vagina: Erythema and tenderness present. No vaginal discharge or bleeding. Uterus: Absent. Comments: Vaginal vault atrophic and shortened  Grade 3 cystocele  No rectocele  Cervix, uterus and ovaries surgically absent  Musculoskeletal:         General: Normal range of motion. Cervical back: Normal range of motion. Skin:     General: Skin is warm and dry. Neurological:      Mental Status: She is alert and oriented to person, place, and time. Psychiatric:         Attention and Perception: Attention normal.         Mood and Affect: Mood normal.         Speech: Speech normal.         Behavior: Behavior normal.         Thought Content:  Thought content normal. Cognition and Memory: Cognition normal.         Judgment: Judgment normal.        Diagnosis Orders   1. Encounter to establish care        2. Cystocele, midline        3. Vaginal prolapse            MEDICATIONS:  Orders Placed This Encounter   Medications    estradiol (ESTRACE VAGINAL) 0.1 MG/GM vaginal cream     Sig: Place 1 g vaginally three times a week     Dispense:  1 each     Refill:  3       ORDERS:  No orders of the defined types were placed in this encounter. PLAN:  Discussed exam findings and treatment options with pt understanding  Likely grade 4 cystocele when she is standing and lifting  Will start vaginal estrogen, declines PFPT but will consider pessary fitting for next visit if no improvement  F/u in 8 weeks or sooner with any problems    There are no Patient Instructions on file for this visit.

## 2023-03-27 ENCOUNTER — OFFICE VISIT (OUTPATIENT)
Dept: CARDIOLOGY | Facility: CLINIC | Age: 78
End: 2023-03-27
Payer: MEDICARE

## 2023-03-27 VITALS
WEIGHT: 99 LBS | HEIGHT: 60 IN | DIASTOLIC BLOOD PRESSURE: 78 MMHG | SYSTOLIC BLOOD PRESSURE: 106 MMHG | OXYGEN SATURATION: 99 % | BODY MASS INDEX: 19.44 KG/M2 | HEART RATE: 57 BPM

## 2023-03-27 DIAGNOSIS — I10 ESSENTIAL HYPERTENSION: Primary | ICD-10-CM

## 2023-03-27 DIAGNOSIS — Z01.818 PREOPERATIVE EVALUATION TO RULE OUT SURGICAL CONTRAINDICATION: ICD-10-CM

## 2023-03-27 DIAGNOSIS — I27.20 PULMONARY HYPERTENSION: ICD-10-CM

## 2023-03-27 DIAGNOSIS — R00.2 HEART PALPITATIONS: ICD-10-CM

## 2023-03-27 PROCEDURE — 3078F DIAST BP <80 MM HG: CPT | Performed by: INTERNAL MEDICINE

## 2023-03-27 PROCEDURE — 1160F RVW MEDS BY RX/DR IN RCRD: CPT | Performed by: INTERNAL MEDICINE

## 2023-03-27 PROCEDURE — 99214 OFFICE O/P EST MOD 30 MIN: CPT | Performed by: INTERNAL MEDICINE

## 2023-03-27 PROCEDURE — 93000 ELECTROCARDIOGRAM COMPLETE: CPT | Performed by: INTERNAL MEDICINE

## 2023-03-27 PROCEDURE — 3074F SYST BP LT 130 MM HG: CPT | Performed by: INTERNAL MEDICINE

## 2023-03-27 PROCEDURE — 1159F MED LIST DOCD IN RCRD: CPT | Performed by: INTERNAL MEDICINE

## 2023-03-27 RX ORDER — LISINOPRIL 10 MG/1
1 TABLET ORAL DAILY
COMMUNITY
Start: 2023-02-24

## 2023-03-27 NOTE — PROGRESS NOTES
Reason for Visit: cardiovascular follow up.    HPI:  Sarah Espinoza is a 78 y.o. female is here today for follow-up.  She is getting ready to have surgery on her bladder prolapse with Dr Malini Allison.  She is doing well from a cardiac standpoint.  She denies any chest pain, palpitations, dizziness, syncope, PND, or orthopnea. Her blood pressure remains well controlled.  She feels well as long as she remains on metoprolol.  Her breathing is good and just has very mild shortness of breath.  She is able to do all of her activities of daily living.      Previous Cardiac Testing and Procedures:  - Echo (12/21/2016) EF 55%, grade 1 diastolic dysfunction, RVSP 45-55 mmHg, normal RV size and function  - Nuclear stress (01/09/2017) normal myocardial perfusion with no evidence of ischemia, EF 70%  - Lipid panel (12/20/16) 113/26/69/103  - BMP (12/2/2021) creatinine 0.9, potassium 3.9, sodium 138    Patient Active Problem List   Diagnosis   • Essential hypertension   • Lupus (HCC)   • Arthritis   • Female bladder prolapse   • Heart palpitations   • Seasonal allergies   • Uterine prolapse   • Pulmonary hypertension (HCC)   • Sjogren's disease (HCC)   • Heme positive stool   • Thrombocytopenia (HCC)       Social History     Tobacco Use   • Smoking status: Never   • Smokeless tobacco: Never   Vaping Use   • Vaping Use: Never used   Substance Use Topics   • Alcohol use: No   • Drug use: No       Family History   Problem Relation Age of Onset   • Heart disease Mother    • No Known Problems Father    • No Known Problems Sister    • Kidney cancer Brother    • Leukemia Sister    • Breast cancer Neg Hx    • Colon cancer Neg Hx    • Esophageal cancer Neg Hx        The following portions of the patient's history were reviewed and updated as appropriate: allergies, current medications, past family history, past medical history, past social history, past surgical history and problem list.      Current Outpatient Medications:   •   "eltrombopag (PROMACTA) 75 MG tablet tablet, Take 12.5 mg by mouth Daily., Disp: , Rfl:   •  Ferrous Sulfate (IRON PO), Take  by mouth., Disp: , Rfl:   •  hydroxychloroquine (PLAQUENIL) 200 MG tablet, Take  by mouth Daily., Disp: , Rfl:   •  lisinopril (PRINIVIL,ZESTRIL) 10 MG tablet, Take 1 tablet by mouth Daily., Disp: , Rfl:   •  metoprolol succinate XL (TOPROL-XL) 50 MG 24 hr tablet, Take 1.5 tablets by mouth Daily., Disp: 135 tablet, Rfl: 3    Review of Systems   Constitutional: Negative for chills and fever.   Cardiovascular: Positive for dyspnea on exertion (mild). Negative for chest pain and paroxysmal nocturnal dyspnea.   Respiratory: Negative for cough and shortness of breath.    Skin: Negative for rash.   Gastrointestinal: Negative for abdominal pain and heartburn.   Neurological: Negative for dizziness and numbness.       Objective   /78 (BP Location: Left arm, Patient Position: Sitting, Cuff Size: Adult)   Pulse 57   Ht 152.4 cm (60\")   Wt 44.9 kg (99 lb)   SpO2 99%   BMI 19.33 kg/m²   Constitutional:       Appearance: Well-developed.   HENT:      Head: Normocephalic and atraumatic.   Pulmonary:      Effort: Pulmonary effort is normal.      Breath sounds: Normal breath sounds.   Cardiovascular:      Bradycardia present. Regular rhythm.      Murmurs: There is no murmur.      No gallop.   Skin:     General: Skin is warm and dry.   Neurological:      Mental Status: Alert and oriented to person, place, and time.         ECG 12 Lead    Date/Time: 3/27/2023 8:41 AM  Performed by: Anant Hernandez MD  Authorized by: Anant Hernandez MD   Comparison: compared with previous ECG from 1/25/2021  Similar to previous ECG  Rhythm: sinus bradycardia    Clinical impression: normal ECG              ICD-10-CM ICD-9-CM   1. Essential hypertension  I10 401.9   2. Pulmonary hypertension (HCC)  I27.20 416.8   3. Heart palpitations  R00.2 785.1   4. Preoperative evaluation to rule out surgical contraindication  " Z01.818 V72.83         Assessment/Plan:  1. Systemic hypertension: Blood pressure remains well controlled.  Continue HCTZ, lisinopril, and metoprolol succinate.     2.  Pulmonary hypertension: Echo from 12/2016 with RVSP 45-55 mmHg.  Lupus and Sjogren's diasease felt to be likely etiologies.  No significant symptoms.  Order repeat echo for surveillance.     3.  Palpitations: No significant symptoms as long as she stays on metoprolol.  If she develops palpitations in the future we will consider Holter monitor.    4.  Preoperative evaluation: Patient is acceptable risk surgical candidate from a cardiac standpoint.  She has no significant cardiac symptoms and has a functional capacity of greater than 4 METS indicated by her ability to do moderate to heavy housework.

## 2023-04-17 ENCOUNTER — OFFICE VISIT (OUTPATIENT)
Dept: OBGYN CLINIC | Age: 78
End: 2023-04-17
Payer: MEDICARE

## 2023-04-17 VITALS
BODY MASS INDEX: 19.44 KG/M2 | SYSTOLIC BLOOD PRESSURE: 150 MMHG | DIASTOLIC BLOOD PRESSURE: 88 MMHG | HEART RATE: 65 BPM | WEIGHT: 99 LBS | HEIGHT: 60 IN

## 2023-04-17 DIAGNOSIS — T88.7XXA SIDE EFFECT OF MEDICATION: ICD-10-CM

## 2023-04-17 DIAGNOSIS — N81.10 VAGINAL PROLAPSE: ICD-10-CM

## 2023-04-17 DIAGNOSIS — N81.11 CYSTOCELE, MIDLINE: Primary | ICD-10-CM

## 2023-04-17 DIAGNOSIS — N95.2 VAGINAL ATROPHY: ICD-10-CM

## 2023-04-17 PROCEDURE — 99213 OFFICE O/P EST LOW 20 MIN: CPT | Performed by: NURSE PRACTITIONER

## 2023-04-17 PROCEDURE — 1090F PRES/ABSN URINE INCON ASSESS: CPT | Performed by: NURSE PRACTITIONER

## 2023-04-17 PROCEDURE — G8400 PT W/DXA NO RESULTS DOC: HCPCS | Performed by: NURSE PRACTITIONER

## 2023-04-17 PROCEDURE — 1123F ACP DISCUSS/DSCN MKR DOCD: CPT | Performed by: NURSE PRACTITIONER

## 2023-04-17 PROCEDURE — G8420 CALC BMI NORM PARAMETERS: HCPCS | Performed by: NURSE PRACTITIONER

## 2023-04-17 PROCEDURE — 1036F TOBACCO NON-USER: CPT | Performed by: NURSE PRACTITIONER

## 2023-04-17 PROCEDURE — 57160 INSERT PESSARY/OTHER DEVICE: CPT | Performed by: NURSE PRACTITIONER

## 2023-04-17 PROCEDURE — G8427 DOCREV CUR MEDS BY ELIG CLIN: HCPCS | Performed by: NURSE PRACTITIONER

## 2023-04-17 ASSESSMENT — ENCOUNTER SYMPTOMS
RESPIRATORY NEGATIVE: 1
GASTROINTESTINAL NEGATIVE: 1
CONSTIPATION: 0
DIARRHEA: 0
ALLERGIC/IMMUNOLOGIC NEGATIVE: 1
EYES NEGATIVE: 1

## 2023-04-17 NOTE — PROGRESS NOTES
Pt is here to f/u on cystocele possible pessary fitting. She states the cystocele is not better still prolapsed.
3. Vaginal atrophy        4. Side effect of medication            MEDICATIONS:  No orders of the defined types were placed in this encounter. ORDERS:  No orders of the defined types were placed in this encounter. PLAN:  Pt fitted and placed size 1 ring pessary with support  Gave precautions with pt understanding  F/u in 1 month or sooner with any problems  Then will initiate every 3 month cleanings    There are no Patient Instructions on file for this visit.

## 2023-04-21 ENCOUNTER — HOSPITAL ENCOUNTER (OUTPATIENT)
Dept: INFUSION THERAPY | Age: 78
Discharge: HOME | End: 2023-04-21
Payer: MEDICARE

## 2023-04-21 ENCOUNTER — OFFICE VISIT (OUTPATIENT)
Dept: HEMATOLOGY | Age: 78
End: 2023-04-21
Payer: MEDICARE

## 2023-04-21 VITALS
OXYGEN SATURATION: 99 % | WEIGHT: 100.1 LBS | DIASTOLIC BLOOD PRESSURE: 78 MMHG | SYSTOLIC BLOOD PRESSURE: 124 MMHG | HEART RATE: 70 BPM | HEIGHT: 60 IN | BODY MASS INDEX: 19.65 KG/M2

## 2023-04-21 DIAGNOSIS — D50.8 IRON DEFICIENCY ANEMIA SECONDARY TO INADEQUATE DIETARY IRON INTAKE: ICD-10-CM

## 2023-04-21 DIAGNOSIS — D69.3 CHRONIC ITP (IDIOPATHIC THROMBOCYTOPENIC PURPURA) (HCC): ICD-10-CM

## 2023-04-21 DIAGNOSIS — D69.3 CHRONIC ITP (IDIOPATHIC THROMBOCYTOPENIC PURPURA) (HCC): Primary | ICD-10-CM

## 2023-04-21 LAB
ALBUMIN SERPL-MCNC: 4.6 G/DL (ref 3.5–5.2)
ALP SERPL-CCNC: 71 U/L (ref 35–104)
ALT SERPL-CCNC: 25 U/L (ref 9–52)
ANION GAP SERPL CALCULATED.3IONS-SCNC: 9 MMOL/L (ref 7–19)
AST SERPL-CCNC: 31 U/L (ref 14–36)
BILIRUB SERPL-MCNC: 0.3 MG/DL (ref 0.2–1.3)
BUN SERPL-MCNC: 15 MG/DL (ref 7–17)
CALCIUM SERPL-MCNC: 9.7 MG/DL (ref 8.4–10.2)
CHLORIDE SERPL-SCNC: 98 MMOL/L (ref 98–111)
CO2 SERPL-SCNC: 28 MMOL/L (ref 22–29)
CREAT SERPL-MCNC: 0.7 MG/DL (ref 0.5–1)
ERYTHROCYTE [DISTWIDTH] IN BLOOD BY AUTOMATED COUNT: 13.1 % (ref 11.7–14.4)
FERRITIN SERPL-MCNC: 218.2 NG/ML (ref 13–150)
GLOBULIN: 2.8 G/DL
GLUCOSE SERPL-MCNC: 101 MG/DL (ref 74–106)
HCT VFR BLD AUTO: 34.6 % (ref 34.1–44.9)
HGB BLD-MCNC: 11.1 G/DL (ref 11.2–15.7)
IRON SATN MFR SERPL: 19 % (ref 14–50)
IRON SERPL-MCNC: 53 UG/DL (ref 37–145)
LYMPHOCYTES # BLD: 1.08 K/UL (ref 1.18–3.74)
LYMPHOCYTES NFR BLD: 17.6 % (ref 19.3–53.1)
MCH RBC QN AUTO: 30 PG (ref 25.6–32.2)
MCHC RBC AUTO-ENTMCNC: 32.1 G/DL (ref 32.3–35.5)
MCV RBC AUTO: 93.5 FL (ref 79.4–94.8)
MONOCYTES # BLD: 0.53 K/UL (ref 0.24–0.82)
MONOCYTES NFR BLD: 8.7 % (ref 4.7–12.5)
NEUTROPHILS # BLD: 4.3 K/UL (ref 1.56–6.13)
NEUTS SEG NFR BLD: 70.2 % (ref 34–71.1)
PLATELET # BLD AUTO: 235 K/UL (ref 182–369)
PMV BLD AUTO: 9.4 FL (ref 7.4–10.4)
POTASSIUM SERPL-SCNC: 4.4 MMOL/L (ref 3.5–5.1)
PROT SERPL-MCNC: 7.4 G/DL (ref 6.3–8.2)
RBC # BLD AUTO: 3.7 M/UL (ref 3.93–5.22)
SODIUM SERPL-SCNC: 135 MMOL/L (ref 137–145)
TIBC SERPL-MCNC: 276 UG/DL (ref 250–400)
WBC # BLD AUTO: 6.12 K/UL (ref 3.98–10.04)

## 2023-04-21 PROCEDURE — 1123F ACP DISCUSS/DSCN MKR DOCD: CPT | Performed by: NURSE PRACTITIONER

## 2023-04-21 PROCEDURE — G8400 PT W/DXA NO RESULTS DOC: HCPCS | Performed by: NURSE PRACTITIONER

## 2023-04-21 PROCEDURE — 80053 COMPREHEN METABOLIC PANEL: CPT

## 2023-04-21 PROCEDURE — G8420 CALC BMI NORM PARAMETERS: HCPCS | Performed by: NURSE PRACTITIONER

## 2023-04-21 PROCEDURE — 99212 OFFICE O/P EST SF 10 MIN: CPT

## 2023-04-21 PROCEDURE — G8427 DOCREV CUR MEDS BY ELIG CLIN: HCPCS | Performed by: NURSE PRACTITIONER

## 2023-04-21 PROCEDURE — 1036F TOBACCO NON-USER: CPT | Performed by: NURSE PRACTITIONER

## 2023-04-21 PROCEDURE — 36415 COLL VENOUS BLD VENIPUNCTURE: CPT

## 2023-04-21 PROCEDURE — 1090F PRES/ABSN URINE INCON ASSESS: CPT | Performed by: NURSE PRACTITIONER

## 2023-04-21 PROCEDURE — 85025 COMPLETE CBC W/AUTO DIFF WBC: CPT

## 2023-04-21 PROCEDURE — 99214 OFFICE O/P EST MOD 30 MIN: CPT | Performed by: NURSE PRACTITIONER

## 2023-04-26 ASSESSMENT — ENCOUNTER SYMPTOMS
BLOOD IN STOOL: 0
WHEEZING: 0
EYE REDNESS: 0
ABDOMINAL PAIN: 0
EYE PAIN: 0
RESPIRATORY NEGATIVE: 1
VOMITING: 0
SORE THROAT: 0
EYE DISCHARGE: 0
BACK PAIN: 0
DIARRHEA: 0
GASTROINTESTINAL NEGATIVE: 1
NAUSEA: 0
EYES NEGATIVE: 1
COUGH: 0
CONSTIPATION: 0
SHORTNESS OF BREATH: 0

## 2023-05-15 ENCOUNTER — OFFICE VISIT (OUTPATIENT)
Dept: OBGYN CLINIC | Age: 78
End: 2023-05-15
Payer: MEDICARE

## 2023-05-15 VITALS
SYSTOLIC BLOOD PRESSURE: 159 MMHG | HEART RATE: 60 BPM | BODY MASS INDEX: 19.63 KG/M2 | HEIGHT: 60 IN | WEIGHT: 100 LBS | DIASTOLIC BLOOD PRESSURE: 73 MMHG

## 2023-05-15 DIAGNOSIS — N95.2 VAGINAL ATROPHY: ICD-10-CM

## 2023-05-15 DIAGNOSIS — N81.10 VAGINAL PROLAPSE: ICD-10-CM

## 2023-05-15 DIAGNOSIS — N81.11 CYSTOCELE, MIDLINE: Primary | ICD-10-CM

## 2023-05-15 DIAGNOSIS — Z46.89 PESSARY MAINTENANCE: ICD-10-CM

## 2023-05-15 PROCEDURE — 1090F PRES/ABSN URINE INCON ASSESS: CPT | Performed by: NURSE PRACTITIONER

## 2023-05-15 PROCEDURE — 1123F ACP DISCUSS/DSCN MKR DOCD: CPT | Performed by: NURSE PRACTITIONER

## 2023-05-15 PROCEDURE — 99213 OFFICE O/P EST LOW 20 MIN: CPT | Performed by: NURSE PRACTITIONER

## 2023-05-15 PROCEDURE — G8400 PT W/DXA NO RESULTS DOC: HCPCS | Performed by: NURSE PRACTITIONER

## 2023-05-15 PROCEDURE — 1036F TOBACCO NON-USER: CPT | Performed by: NURSE PRACTITIONER

## 2023-05-15 PROCEDURE — G8427 DOCREV CUR MEDS BY ELIG CLIN: HCPCS | Performed by: NURSE PRACTITIONER

## 2023-05-15 PROCEDURE — G8420 CALC BMI NORM PARAMETERS: HCPCS | Performed by: NURSE PRACTITIONER

## 2023-05-15 ASSESSMENT — ENCOUNTER SYMPTOMS
CONSTIPATION: 0
ALLERGIC/IMMUNOLOGIC NEGATIVE: 1
DIARRHEA: 0
GASTROINTESTINAL NEGATIVE: 1
EYES NEGATIVE: 1
RESPIRATORY NEGATIVE: 1

## 2023-05-15 NOTE — PROGRESS NOTES
Ricky Lees is a 70 y.o. female who presents today for her medical conditions/ complaints as noted below. Ricky Lees is c/o of Other (Pessary maintenance )        HPI  Pt presents for 1 month f/u from pessary insertion. Has been working well, helping with  prolapse. Feels like she has had to push it back up in some this past week but overall feels better. No bleeding or pain. No discharge or odor. No LMP recorded. Patient has had a hysterectomy. No obstetric history on file. 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    Sleep apnea     Systemic lupus erythematosus ([facility] 7/26/2019    Thrombocytopenia, unspecified ([facility] 7/26/2019     Past Surgical History:   Procedure Laterality Date    APPENDECTOMY      BREAST SURGERY      FOOT SURGERY      HYSTERECTOMY (CERVIX STATUS UNKNOWN)      TUBAL LIGATION       Family History   Problem Relation Age of Onset    Heart Disease Mother     Cancer Sister     Cancer Brother      Social History     Tobacco Use    Smoking status: Never    Smokeless tobacco: Never   Substance Use Topics    Alcohol use: Not Currently       Current Outpatient Medications   Medication Sig Dispense Refill    ferrous sulfate (IRON 325) 325 (65 Fe) MG tablet Take 1 tablet by mouth 2 times daily 60 tablet 5    lisinopril (PRINIVIL;ZESTRIL) 10 MG tablet Take 1 tablet by mouth daily      eltrombopag (PROMACTA) 12.5 MG TABS tablet Take 1 tablet by mouth daily 30 tablet 11    metoprolol succinate (TOPROL XL) 50 MG extended release tablet Take 1 tablet by mouth daily      hydroxychloroquine (PLAQUENIL) 200 MG tablet Take by mouth daily       No current facility-administered medications for this visit.      Allergies   Allergen Reactions    Ondansetron Hcl     Oxycodone-Aspirin Other (See Comments)     Vitals:    05/15/23 0954   BP: (!) 159/73   Pulse: 60

## 2023-05-15 NOTE — PROGRESS NOTES
Pt is here for pessary maintenance states she is doing ok she thinks and it might have dropped a little.

## 2023-05-15 NOTE — PATIENT INSTRUCTIONS
Patient Education        Pessary: Care Instructions  Overview     A pessary is a device that fits into your vagina and supports the pelvic organs. It may be used if a pelvic organ moves out of its normal position (prolapse). For some, wearing a pessary means that they may not need to have surgery to fix a prolapse. A pessary may also be used to help treat stress urinary incontinence. There are many sizes and types of pessaries. Which type you use depends on the problem you have. Your doctor will make sure the pessary is just right for you. You may need to try different kinds to find the best fit. The pessary should hold the pelvic organs in place without causing pain or pressure. You may be able to have vaginal sex with your pessary in place. It depends on the type of pessary and your comfort level. Follow-up care is a key part of your treatment and safety. Be sure to make and go to all appointments, and call your doctor if you are having problems. It's also a good idea to know your test results and keep a list of the medicines you take. How can you care for yourself at home? If you get a vaginal discharge while you have a pessary, talk to your doctor about ways to reduce the discharge and smell. A pessary, in some cases, rubs the vagina and may cause irritation and discharge. If your vagina feels sore, talk to your doctor about a cream or gel to protect the vagina. Your doctor may also change the type of pessary. Follow your doctor's advice on how long you can wear your pessary before it needs to be cleaned. You may be able to remove and clean it yourself. Or your doctor may want to do this during an office visit. Your doctor may tell you to leave the pessary out for a specific length of time after cleaning it. If you clean your pessary, wash it with mild soap and water. Follow your doctor's advice on inserting the pessary. Do not douche or use a vaginal wash unless your doctor tells you to do so.   Do not

## 2023-05-17 ENCOUNTER — HOSPITAL ENCOUNTER (OUTPATIENT)
Dept: CARDIOLOGY | Facility: HOSPITAL | Age: 78
Discharge: HOME | End: 2023-05-17
Admitting: INTERNAL MEDICINE
Payer: MEDICARE

## 2023-05-17 VITALS
BODY MASS INDEX: 19.48 KG/M2 | HEIGHT: 60 IN | SYSTOLIC BLOOD PRESSURE: 159 MMHG | DIASTOLIC BLOOD PRESSURE: 73 MMHG | WEIGHT: 99.21 LBS

## 2023-05-17 PROCEDURE — 93306 TTE W/DOPPLER COMPLETE: CPT | Performed by: INTERNAL MEDICINE

## 2023-05-17 PROCEDURE — 93306 TTE W/DOPPLER COMPLETE: CPT

## 2023-05-19 LAB
BH CV ECHO MEAS - AO MAX PG: 7 MMHG
BH CV ECHO MEAS - AO MEAN PG: 4 MMHG
BH CV ECHO MEAS - AO ROOT DIAM: 2.6 CM
BH CV ECHO MEAS - AO V2 MAX: 132 CM/SEC
BH CV ECHO MEAS - AO V2 VTI: 29.3 CM
BH CV ECHO MEAS - AVA(I,D): 1.36 CM2
BH CV ECHO MEAS - EDV(CUBED): 59.3 ML
BH CV ECHO MEAS - EDV(MOD-SP2): 43.6 ML
BH CV ECHO MEAS - EDV(MOD-SP4): 41.1 ML
BH CV ECHO MEAS - EF(MOD-BP): 64.8 %
BH CV ECHO MEAS - EF(MOD-SP2): 71.1 %
BH CV ECHO MEAS - EF(MOD-SP4): 63 %
BH CV ECHO MEAS - ESV(CUBED): 6.3 ML
BH CV ECHO MEAS - ESV(MOD-SP2): 12.6 ML
BH CV ECHO MEAS - ESV(MOD-SP4): 15.2 ML
BH CV ECHO MEAS - FS: 52.6 %
BH CV ECHO MEAS - IVS/LVPW: 1.11 CM
BH CV ECHO MEAS - IVSD: 1 CM
BH CV ECHO MEAS - LA DIMENSION: 3.5 CM
BH CV ECHO MEAS - LAT PEAK E' VEL: 6.2 CM/SEC
BH CV ECHO MEAS - LV DIASTOLIC VOL/BSA (35-75): 29.7 CM2
BH CV ECHO MEAS - LV MASS(C)D: 113.6 GRAMS
BH CV ECHO MEAS - LV MAX PG: 1.86 MMHG
BH CV ECHO MEAS - LV MEAN PG: 1 MMHG
BH CV ECHO MEAS - LV SYSTOLIC VOL/BSA (12-30): 11 CM2
BH CV ECHO MEAS - LV V1 MAX: 68.1 CM/SEC
BH CV ECHO MEAS - LV V1 VTI: 15.6 CM
BH CV ECHO MEAS - LVIDD: 3.9 CM
BH CV ECHO MEAS - LVIDS: 1.85 CM
BH CV ECHO MEAS - LVOT AREA: 2.5 CM2
BH CV ECHO MEAS - LVOT DIAM: 1.8 CM
BH CV ECHO MEAS - LVPWD: 0.9 CM
BH CV ECHO MEAS - MED PEAK E' VEL: 8.6 CM/SEC
BH CV ECHO MEAS - MR MAX PG: 91.9 MMHG
BH CV ECHO MEAS - MR MAX VEL: 468 CM/SEC
BH CV ECHO MEAS - MR MEAN PG: 60 MMHG
BH CV ECHO MEAS - MR MEAN VEL: 367 CM/SEC
BH CV ECHO MEAS - MR VTI: 164 CM
BH CV ECHO MEAS - MV A MAX VEL: 80.2 CM/SEC
BH CV ECHO MEAS - MV DEC TIME: 0.17 MSEC
BH CV ECHO MEAS - MV E MAX VEL: 93.6 CM/SEC
BH CV ECHO MEAS - MV E/A: 1.17
BH CV ECHO MEAS - PI END-D VEL: 84.4 CM/SEC
BH CV ECHO MEAS - SI(MOD-SP2): 22.4 ML/M2
BH CV ECHO MEAS - SI(MOD-SP4): 18.7 ML/M2
BH CV ECHO MEAS - SV(LVOT): 39.7 ML
BH CV ECHO MEAS - SV(MOD-SP2): 31 ML
BH CV ECHO MEAS - SV(MOD-SP4): 25.9 ML
BH CV ECHO MEAS - TR MAX PG: 13 MMHG
BH CV ECHO MEAS - TR MAX VEL: 180 CM/SEC
BH CV ECHO MEASUREMENTS AVERAGE E/E' RATIO: 12.65
LEFT ATRIUM VOLUME INDEX: 14.7 ML/M2
LEFT ATRIUM VOLUME: 20.3 ML

## 2023-05-22 ENCOUNTER — HOSPITAL ENCOUNTER (OUTPATIENT)
Dept: INFUSION THERAPY | Age: 78
Discharge: HOME | End: 2023-05-22
Payer: MEDICARE

## 2023-05-22 DIAGNOSIS — D69.3 CHRONIC ITP (IDIOPATHIC THROMBOCYTOPENIC PURPURA) (HCC): ICD-10-CM

## 2023-05-22 LAB
BASOPHILS # BLD: 0.05 K/UL (ref 0.01–0.08)
BASOPHILS NFR BLD: 1 % (ref 0.1–1.2)
EOSINOPHIL # BLD: 0.14 K/UL (ref 0.04–0.54)
EOSINOPHIL NFR BLD: 2.7 % (ref 0.7–7)
ERYTHROCYTE [DISTWIDTH] IN BLOOD BY AUTOMATED COUNT: 12.3 % (ref 11.7–14.4)
HCT VFR BLD AUTO: 34.9 % (ref 34.1–44.9)
HGB BLD-MCNC: 11 G/DL (ref 11.2–15.7)
LYMPHOCYTES # BLD: 1.17 K/UL (ref 1.18–3.74)
LYMPHOCYTES NFR BLD: 22.4 % (ref 19.3–53.1)
MCH RBC QN AUTO: 30.6 PG (ref 25.6–32.2)
MCHC RBC AUTO-ENTMCNC: 31.5 G/DL (ref 32.3–35.5)
MCV RBC AUTO: 96.9 FL (ref 79.4–94.8)
MONOCYTES # BLD: 0.48 K/UL (ref 0.24–0.82)
MONOCYTES NFR BLD: 9.2 % (ref 4.7–12.5)
NEUTROPHILS # BLD: 3.38 K/UL (ref 1.56–6.13)
NEUTS SEG NFR BLD: 64.5 % (ref 34–71.1)
PLATELET # BLD AUTO: 218 K/UL (ref 182–369)
PMV BLD AUTO: 9.9 FL (ref 7.4–10.4)
RBC # BLD AUTO: 3.6 M/UL (ref 3.93–5.22)
WBC # BLD AUTO: 5.23 K/UL (ref 3.98–10.04)

## 2023-05-22 PROCEDURE — 36415 COLL VENOUS BLD VENIPUNCTURE: CPT

## 2023-05-22 PROCEDURE — 85025 COMPLETE CBC W/AUTO DIFF WBC: CPT

## 2023-05-24 ENCOUNTER — TELEPHONE (OUTPATIENT)
Dept: CARDIOLOGY | Facility: CLINIC | Age: 78
End: 2023-05-24
Payer: MEDICARE

## 2023-05-24 NOTE — TELEPHONE ENCOUNTER
----- Message from Anant Hernandez MD sent at 5/23/2023  7:45 PM CDT -----  Please let her know that the echo shows normal structure and function of her heart with mild age-related changes.  The blood pressure in her lungs was unable to be estimated based off the study but the overall findings of the echo showed no evidence of pulmonary hypertension.

## 2023-07-14 ENCOUNTER — HOSPITAL ENCOUNTER (OUTPATIENT)
Dept: INFUSION THERAPY | Age: 78
Discharge: HOME | End: 2023-07-14
Payer: MEDICARE

## 2023-07-14 ENCOUNTER — OFFICE VISIT (OUTPATIENT)
Dept: HEMATOLOGY | Age: 78
End: 2023-07-14
Payer: MEDICARE

## 2023-07-14 VITALS
OXYGEN SATURATION: 98 % | BODY MASS INDEX: 18.98 KG/M2 | WEIGHT: 97.2 LBS | SYSTOLIC BLOOD PRESSURE: 120 MMHG | DIASTOLIC BLOOD PRESSURE: 70 MMHG | HEART RATE: 69 BPM

## 2023-07-14 DIAGNOSIS — R53.82 CHRONIC FATIGUE: ICD-10-CM

## 2023-07-14 DIAGNOSIS — D50.8 IRON DEFICIENCY ANEMIA SECONDARY TO INADEQUATE DIETARY IRON INTAKE: ICD-10-CM

## 2023-07-14 DIAGNOSIS — D69.3 CHRONIC ITP (IDIOPATHIC THROMBOCYTOPENIC PURPURA) (HCC): Primary | ICD-10-CM

## 2023-07-14 DIAGNOSIS — D69.3 CHRONIC ITP (IDIOPATHIC THROMBOCYTOPENIC PURPURA) (HCC): ICD-10-CM

## 2023-07-14 LAB
ALBUMIN SERPL-MCNC: 4.5 G/DL (ref 3.5–5.2)
ALP SERPL-CCNC: 71 U/L (ref 35–104)
ALT SERPL-CCNC: 27 U/L (ref 9–52)
ANION GAP SERPL CALCULATED.3IONS-SCNC: 11 MMOL/L (ref 7–19)
AST SERPL-CCNC: 33 U/L (ref 14–36)
BASOPHILS # BLD: 0.03 K/UL (ref 0.01–0.08)
BASOPHILS NFR BLD: 0.7 % (ref 0.1–1.2)
BILIRUB SERPL-MCNC: <0.2 MG/DL (ref 0.2–1.3)
BUN SERPL-MCNC: 10 MG/DL (ref 7–17)
CALCIUM SERPL-MCNC: 9 MG/DL (ref 8.4–10.2)
CHLORIDE SERPL-SCNC: 105 MMOL/L (ref 98–111)
CO2 SERPL-SCNC: 25 MMOL/L (ref 22–29)
CREAT SERPL-MCNC: 0.8 MG/DL (ref 0.5–1)
EOSINOPHIL # BLD: 0.14 K/UL (ref 0.04–0.54)
EOSINOPHIL NFR BLD: 3.3 % (ref 0.7–7)
ERYTHROCYTE [DISTWIDTH] IN BLOOD BY AUTOMATED COUNT: 12.3 % (ref 11.7–14.4)
FERRITIN SERPL-MCNC: 198.7 NG/ML (ref 13–150)
GLOBULIN: 2.6 G/DL
GLUCOSE SERPL-MCNC: 99 MG/DL (ref 74–106)
HCT VFR BLD AUTO: 33.9 % (ref 34.1–44.9)
HGB BLD-MCNC: 10.6 G/DL (ref 11.2–15.7)
IRON SATN MFR SERPL: 25 % (ref 14–50)
IRON SERPL-MCNC: 67 UG/DL (ref 37–145)
LYMPHOCYTES # BLD: 0.98 K/UL (ref 1.18–3.74)
LYMPHOCYTES NFR BLD: 22.8 % (ref 19.3–53.1)
MCH RBC QN AUTO: 30.2 PG (ref 25.6–32.2)
MCHC RBC AUTO-ENTMCNC: 31.3 G/DL (ref 32.3–35.5)
MCV RBC AUTO: 96.6 FL (ref 79.4–94.8)
MONOCYTES # BLD: 0.39 K/UL (ref 0.24–0.82)
MONOCYTES NFR BLD: 9.1 % (ref 4.7–12.5)
NEUTROPHILS # BLD: 2.74 K/UL (ref 1.56–6.13)
NEUTS SEG NFR BLD: 63.9 % (ref 34–71.1)
PLATELET # BLD AUTO: 214 K/UL (ref 182–369)
PMV BLD AUTO: 10.6 FL (ref 7.4–10.4)
POTASSIUM SERPL-SCNC: 3.3 MMOL/L (ref 3.5–5.1)
PROT SERPL-MCNC: 7.1 G/DL (ref 6.3–8.2)
RBC # BLD AUTO: 3.51 M/UL (ref 3.93–5.22)
SODIUM SERPL-SCNC: 141 MMOL/L (ref 137–145)
TIBC SERPL-MCNC: 263 UG/DL (ref 250–400)
WBC # BLD AUTO: 4.29 K/UL (ref 3.98–10.04)

## 2023-07-14 PROCEDURE — 80053 COMPREHEN METABOLIC PANEL: CPT

## 2023-07-14 PROCEDURE — 1036F TOBACCO NON-USER: CPT | Performed by: NURSE PRACTITIONER

## 2023-07-14 PROCEDURE — G8427 DOCREV CUR MEDS BY ELIG CLIN: HCPCS | Performed by: NURSE PRACTITIONER

## 2023-07-14 PROCEDURE — 99212 OFFICE O/P EST SF 10 MIN: CPT

## 2023-07-14 PROCEDURE — 1090F PRES/ABSN URINE INCON ASSESS: CPT | Performed by: NURSE PRACTITIONER

## 2023-07-14 PROCEDURE — G8420 CALC BMI NORM PARAMETERS: HCPCS | Performed by: NURSE PRACTITIONER

## 2023-07-14 PROCEDURE — 36415 COLL VENOUS BLD VENIPUNCTURE: CPT

## 2023-07-14 PROCEDURE — G8400 PT W/DXA NO RESULTS DOC: HCPCS | Performed by: NURSE PRACTITIONER

## 2023-07-14 PROCEDURE — 85025 COMPLETE CBC W/AUTO DIFF WBC: CPT

## 2023-07-14 PROCEDURE — 1123F ACP DISCUSS/DSCN MKR DOCD: CPT | Performed by: NURSE PRACTITIONER

## 2023-07-14 PROCEDURE — 99214 OFFICE O/P EST MOD 30 MIN: CPT | Performed by: NURSE PRACTITIONER

## 2023-07-17 ASSESSMENT — ENCOUNTER SYMPTOMS
VOMITING: 0
EYE PAIN: 0
RESPIRATORY NEGATIVE: 1
SORE THROAT: 0
EYE REDNESS: 0
EYES NEGATIVE: 1
ABDOMINAL PAIN: 0
SHORTNESS OF BREATH: 0
BACK PAIN: 0
DIARRHEA: 0
NAUSEA: 0
COUGH: 0
WHEEZING: 0
CONSTIPATION: 0
BLOOD IN STOOL: 0
EYE DISCHARGE: 0
GASTROINTESTINAL NEGATIVE: 1

## 2023-07-19 ENCOUNTER — TELEPHONE (OUTPATIENT)
Dept: HEMATOLOGY | Age: 78
End: 2023-07-19

## 2023-07-19 NOTE — TELEPHONE ENCOUNTER
----- Message from Reatha Boeck, APRN sent at 7/17/2023 12:15 PM CDT -----  Please call [place] and ask her to increase her potassium rich foods, she has a potassium of 3.3

## 2023-07-19 NOTE — TELEPHONE ENCOUNTER
Called patient and reviewed lab results, K+ 3.3. Instructed to increase intake of foods high in potassium such as bananas, orange juice, tomatoes, baked potatoes. Patient v/u.

## 2023-07-28 ENCOUNTER — TRANSCRIBE ORDERS (OUTPATIENT)
Dept: ADMINISTRATIVE | Facility: HOSPITAL | Age: 78
End: 2023-07-28
Payer: MEDICARE

## 2023-07-28 DIAGNOSIS — M79.89 MASS OF SOFT TISSUE: Primary | ICD-10-CM

## 2023-08-02 ENCOUNTER — HOSPITAL ENCOUNTER (OUTPATIENT)
Dept: ULTRASOUND IMAGING | Facility: HOSPITAL | Age: 78
Discharge: HOME | End: 2023-08-02
Admitting: NURSE PRACTITIONER
Payer: MEDICARE

## 2023-08-02 PROCEDURE — 76882 US LMTD JT/FCL EVL NVASC XTR: CPT

## 2023-08-15 ENCOUNTER — OFFICE VISIT (OUTPATIENT)
Dept: OBGYN CLINIC | Age: 78
End: 2023-08-15
Payer: MEDICARE

## 2023-08-15 VITALS
HEIGHT: 60 IN | SYSTOLIC BLOOD PRESSURE: 165 MMHG | WEIGHT: 97 LBS | HEART RATE: 62 BPM | BODY MASS INDEX: 19.04 KG/M2 | DIASTOLIC BLOOD PRESSURE: 74 MMHG

## 2023-08-15 DIAGNOSIS — N81.10 VAGINAL PROLAPSE: ICD-10-CM

## 2023-08-15 DIAGNOSIS — Z46.89 PESSARY MAINTENANCE: ICD-10-CM

## 2023-08-15 DIAGNOSIS — N81.11 CYSTOCELE, MIDLINE: Primary | ICD-10-CM

## 2023-08-15 PROCEDURE — 1036F TOBACCO NON-USER: CPT | Performed by: NURSE PRACTITIONER

## 2023-08-15 PROCEDURE — 99213 OFFICE O/P EST LOW 20 MIN: CPT | Performed by: NURSE PRACTITIONER

## 2023-08-15 PROCEDURE — G8400 PT W/DXA NO RESULTS DOC: HCPCS | Performed by: NURSE PRACTITIONER

## 2023-08-15 PROCEDURE — G8427 DOCREV CUR MEDS BY ELIG CLIN: HCPCS | Performed by: NURSE PRACTITIONER

## 2023-08-15 PROCEDURE — G8420 CALC BMI NORM PARAMETERS: HCPCS | Performed by: NURSE PRACTITIONER

## 2023-08-15 PROCEDURE — 1090F PRES/ABSN URINE INCON ASSESS: CPT | Performed by: NURSE PRACTITIONER

## 2023-08-15 PROCEDURE — 1123F ACP DISCUSS/DSCN MKR DOCD: CPT | Performed by: NURSE PRACTITIONER

## 2023-08-15 ASSESSMENT — ENCOUNTER SYMPTOMS
EYES NEGATIVE: 1
RESPIRATORY NEGATIVE: 1
GASTROINTESTINAL NEGATIVE: 1
DIARRHEA: 0
CONSTIPATION: 0
ALLERGIC/IMMUNOLOGIC NEGATIVE: 1

## 2023-08-15 NOTE — PROGRESS NOTES
Pt is here for pessary maintenance and states she is doing good with it thinks it might have moved a little bit.

## 2023-10-06 ENCOUNTER — TELEPHONE (OUTPATIENT)
Dept: HEMATOLOGY | Age: 78
End: 2023-10-06

## 2023-10-06 NOTE — TELEPHONE ENCOUNTER
Called patient and had to leave a vm reminding them of their upcoming appt on 10/10/23.     Electronically signed by Juaquin Gallardo MA on 10/6/2023 at 3:37 PM

## 2023-10-09 DIAGNOSIS — D69.3 CHRONIC ITP (IDIOPATHIC THROMBOCYTOPENIC PURPURA) (HCC): Primary | ICD-10-CM

## 2023-10-10 ENCOUNTER — OFFICE VISIT (OUTPATIENT)
Dept: HEMATOLOGY | Age: 78
End: 2023-10-10
Payer: MEDICARE

## 2023-10-10 ENCOUNTER — HOSPITAL ENCOUNTER (OUTPATIENT)
Dept: INFUSION THERAPY | Age: 78
Discharge: HOME | End: 2023-10-10
Payer: MEDICARE

## 2023-10-10 VITALS
HEART RATE: 50 BPM | WEIGHT: 95.3 LBS | OXYGEN SATURATION: 95 % | DIASTOLIC BLOOD PRESSURE: 78 MMHG | BODY MASS INDEX: 18.71 KG/M2 | HEIGHT: 60 IN | TEMPERATURE: 98 F | SYSTOLIC BLOOD PRESSURE: 170 MMHG

## 2023-10-10 DIAGNOSIS — R53.82 CHRONIC FATIGUE: ICD-10-CM

## 2023-10-10 DIAGNOSIS — D50.8 IRON DEFICIENCY ANEMIA SECONDARY TO INADEQUATE DIETARY IRON INTAKE: Primary | ICD-10-CM

## 2023-10-10 DIAGNOSIS — Z79.899 MEDICATION MANAGEMENT: ICD-10-CM

## 2023-10-10 DIAGNOSIS — D69.3 CHRONIC ITP (IDIOPATHIC THROMBOCYTOPENIC PURPURA) (HCC): ICD-10-CM

## 2023-10-10 LAB
ALBUMIN SERPL-MCNC: 4.5 G/DL (ref 3.5–5.2)
ALP SERPL-CCNC: 67 U/L (ref 35–104)
ALT SERPL-CCNC: 22 U/L (ref 9–52)
ANION GAP SERPL CALCULATED.3IONS-SCNC: 6 MMOL/L (ref 7–19)
AST SERPL-CCNC: 35 U/L (ref 14–36)
BASOPHILS # BLD: 0.04 K/UL (ref 0.01–0.08)
BASOPHILS NFR BLD: 0.8 % (ref 0.1–1.2)
BILIRUB SERPL-MCNC: 0.3 MG/DL (ref 0.2–1.3)
BUN SERPL-MCNC: 12 MG/DL (ref 7–17)
CALCIUM SERPL-MCNC: 9.7 MG/DL (ref 8.4–10.2)
CHLORIDE SERPL-SCNC: 105 MMOL/L (ref 98–111)
CO2 SERPL-SCNC: 27 MMOL/L (ref 22–29)
CREAT SERPL-MCNC: 0.8 MG/DL (ref 0.5–1)
EOSINOPHIL # BLD: 0.19 K/UL (ref 0.04–0.54)
EOSINOPHIL NFR BLD: 4 % (ref 0.7–7)
ERYTHROCYTE [DISTWIDTH] IN BLOOD BY AUTOMATED COUNT: 13.2 % (ref 11.7–14.4)
GLOBULIN: 2.4 G/DL
GLUCOSE SERPL-MCNC: 94 MG/DL (ref 74–106)
HCT VFR BLD AUTO: 34.2 % (ref 34.1–44.9)
HGB BLD-MCNC: 11.7 G/DL (ref 11.2–15.7)
LYMPHOCYTES # BLD: 1.27 K/UL (ref 1.18–3.74)
LYMPHOCYTES NFR BLD: 27 % (ref 19.3–53.1)
MCH RBC QN AUTO: 30 PG (ref 25.6–32.2)
MCHC RBC AUTO-ENTMCNC: 34.2 G/DL (ref 32.3–35.5)
MCV RBC AUTO: 87.7 FL (ref 79.4–94.8)
MONOCYTES # BLD: 0.44 K/UL (ref 0.24–0.82)
MONOCYTES NFR BLD: 9.3 % (ref 4.7–12.5)
NEUTROPHILS # BLD: 2.75 K/UL (ref 1.56–6.13)
NEUTS SEG NFR BLD: 58.5 % (ref 34–71.1)
PLATELET # BLD AUTO: 188 K/UL (ref 182–369)
PMV BLD AUTO: 10.4 FL (ref 7.4–10.4)
POTASSIUM SERPL-SCNC: 4.1 MMOL/L (ref 3.5–5.1)
PROT SERPL-MCNC: 6.8 G/DL (ref 6.3–8.2)
RBC # BLD AUTO: 3.9 M/UL (ref 3.93–5.22)
SODIUM SERPL-SCNC: 138 MMOL/L (ref 137–145)
WBC # BLD AUTO: 4.71 K/UL (ref 3.98–10.04)

## 2023-10-10 PROCEDURE — G8400 PT W/DXA NO RESULTS DOC: HCPCS | Performed by: NURSE PRACTITIONER

## 2023-10-10 PROCEDURE — G8428 CUR MEDS NOT DOCUMENT: HCPCS | Performed by: NURSE PRACTITIONER

## 2023-10-10 PROCEDURE — 99214 OFFICE O/P EST MOD 30 MIN: CPT | Performed by: NURSE PRACTITIONER

## 2023-10-10 PROCEDURE — 36415 COLL VENOUS BLD VENIPUNCTURE: CPT

## 2023-10-10 PROCEDURE — G8420 CALC BMI NORM PARAMETERS: HCPCS | Performed by: NURSE PRACTITIONER

## 2023-10-10 PROCEDURE — 1090F PRES/ABSN URINE INCON ASSESS: CPT | Performed by: NURSE PRACTITIONER

## 2023-10-10 PROCEDURE — 80053 COMPREHEN METABOLIC PANEL: CPT

## 2023-10-10 PROCEDURE — 99212 OFFICE O/P EST SF 10 MIN: CPT

## 2023-10-10 PROCEDURE — 1123F ACP DISCUSS/DSCN MKR DOCD: CPT | Performed by: NURSE PRACTITIONER

## 2023-10-10 PROCEDURE — 1036F TOBACCO NON-USER: CPT | Performed by: NURSE PRACTITIONER

## 2023-10-10 PROCEDURE — 85025 COMPLETE CBC W/AUTO DIFF WBC: CPT

## 2023-10-10 PROCEDURE — G8484 FLU IMMUNIZE NO ADMIN: HCPCS | Performed by: NURSE PRACTITIONER

## 2023-10-12 ASSESSMENT — ENCOUNTER SYMPTOMS
COUGH: 0
BLOOD IN STOOL: 0
SORE THROAT: 0
EYES NEGATIVE: 1
NAUSEA: 0
EYE REDNESS: 0
BACK PAIN: 0
SHORTNESS OF BREATH: 0
ABDOMINAL PAIN: 0
EYE DISCHARGE: 0
EYE PAIN: 0
GASTROINTESTINAL NEGATIVE: 1
DIARRHEA: 0
WHEEZING: 0
CONSTIPATION: 0
RESPIRATORY NEGATIVE: 1
VOMITING: 0

## 2023-10-31 ENCOUNTER — CLINICAL DOCUMENTATION (OUTPATIENT)
Facility: HOSPITAL | Age: 78
End: 2023-10-31

## 2023-10-31 NOTE — PROGRESS NOTES
Talked with pt on the phone to go over her reenrollment for 2024 for her Promacta. We have set up a follow up appointment for her to come into the office and sign the application.

## 2023-11-08 ENCOUNTER — CLINICAL DOCUMENTATION (OUTPATIENT)
Facility: HOSPITAL | Age: 78
End: 2023-11-08

## 2023-11-14 ENCOUNTER — HOSPITAL ENCOUNTER (EMERGENCY)
Age: 78
Discharge: HOME | End: 2023-11-14
Attending: EMERGENCY MEDICINE
Payer: MEDICARE

## 2023-11-14 ENCOUNTER — OFFICE VISIT (OUTPATIENT)
Dept: OBGYN CLINIC | Age: 78
End: 2023-11-14
Payer: MEDICARE

## 2023-11-14 ENCOUNTER — TRANSCRIBE ORDERS (OUTPATIENT)
Dept: ADMINISTRATIVE | Facility: HOSPITAL | Age: 78
End: 2023-11-14
Payer: MEDICARE

## 2023-11-14 VITALS
BODY MASS INDEX: 18.36 KG/M2 | SYSTOLIC BLOOD PRESSURE: 200 MMHG | DIASTOLIC BLOOD PRESSURE: 86 MMHG | WEIGHT: 94 LBS | HEART RATE: 66 BPM

## 2023-11-14 VITALS
OXYGEN SATURATION: 99 % | DIASTOLIC BLOOD PRESSURE: 70 MMHG | WEIGHT: 94 LBS | BODY MASS INDEX: 18.36 KG/M2 | HEART RATE: 77 BPM | TEMPERATURE: 97.4 F | SYSTOLIC BLOOD PRESSURE: 172 MMHG | RESPIRATION RATE: 16 BRPM

## 2023-11-14 DIAGNOSIS — I10 ELEVATED BLOOD PRESSURE READING IN OFFICE WITH DIAGNOSIS OF HYPERTENSION: ICD-10-CM

## 2023-11-14 DIAGNOSIS — Z46.89 PESSARY MAINTENANCE: Primary | ICD-10-CM

## 2023-11-14 DIAGNOSIS — N95.2 VAGINAL ATROPHY: ICD-10-CM

## 2023-11-14 DIAGNOSIS — I10 ASYMPTOMATIC HYPERTENSION: Primary | ICD-10-CM

## 2023-11-14 DIAGNOSIS — R09.89 POOR CIRCULATION: Primary | ICD-10-CM

## 2023-11-14 DIAGNOSIS — N81.10 VAGINAL PROLAPSE: ICD-10-CM

## 2023-11-14 DIAGNOSIS — N81.11 CYSTOCELE, MIDLINE: ICD-10-CM

## 2023-11-14 LAB
ALBUMIN SERPL-MCNC: 4.7 G/DL (ref 3.5–5.2)
ALP SERPL-CCNC: 71 U/L (ref 35–104)
ALT SERPL-CCNC: 17 U/L (ref 5–33)
ANION GAP SERPL CALCULATED.3IONS-SCNC: 14 MMOL/L (ref 7–19)
AST SERPL-CCNC: 23 U/L (ref 5–32)
BASOPHILS # BLD: 0.1 K/UL (ref 0–0.2)
BASOPHILS NFR BLD: 1.1 % (ref 0–1)
BILIRUB SERPL-MCNC: <0.2 MG/DL (ref 0.2–1.2)
BUN SERPL-MCNC: 10 MG/DL (ref 8–23)
CALCIUM SERPL-MCNC: 9.6 MG/DL (ref 8.8–10.2)
CHLORIDE SERPL-SCNC: 100 MMOL/L (ref 98–111)
CO2 SERPL-SCNC: 23 MMOL/L (ref 22–29)
CREAT SERPL-MCNC: 0.7 MG/DL (ref 0.5–0.9)
EOSINOPHIL # BLD: 0.2 K/UL (ref 0–0.6)
EOSINOPHIL NFR BLD: 4.3 % (ref 0–5)
ERYTHROCYTE [DISTWIDTH] IN BLOOD BY AUTOMATED COUNT: 13 % (ref 11.5–14.5)
GLUCOSE SERPL-MCNC: 104 MG/DL (ref 74–109)
HCT VFR BLD AUTO: 34.3 % (ref 37–47)
HGB BLD-MCNC: 11.6 G/DL (ref 12–16)
IMM GRANULOCYTES # BLD: 0 K/UL
LYMPHOCYTES # BLD: 1.2 K/UL (ref 1.1–4.5)
LYMPHOCYTES NFR BLD: 25.5 % (ref 20–40)
MCH RBC QN AUTO: 30.5 PG (ref 27–31)
MCHC RBC AUTO-ENTMCNC: 33.8 G/DL (ref 33–37)
MCV RBC AUTO: 90.3 FL (ref 81–99)
MONOCYTES # BLD: 0.5 K/UL (ref 0–0.9)
MONOCYTES NFR BLD: 10.6 % (ref 0–10)
NEUTROPHILS # BLD: 2.7 K/UL (ref 1.5–7.5)
NEUTS SEG NFR BLD: 58.3 % (ref 50–65)
PLATELET # BLD AUTO: 313 K/UL (ref 130–400)
PMV BLD AUTO: 8.9 FL (ref 9.4–12.3)
POTASSIUM SERPL-SCNC: 3.8 MMOL/L (ref 3.5–5)
PROT SERPL-MCNC: 6.9 G/DL (ref 6.6–8.7)
RBC # BLD AUTO: 3.8 M/UL (ref 4.2–5.4)
SODIUM SERPL-SCNC: 137 MMOL/L (ref 136–145)
WBC # BLD AUTO: 4.7 K/UL (ref 4.8–10.8)

## 2023-11-14 PROCEDURE — G8484 FLU IMMUNIZE NO ADMIN: HCPCS | Performed by: NURSE PRACTITIONER

## 2023-11-14 PROCEDURE — 80053 COMPREHEN METABOLIC PANEL: CPT

## 2023-11-14 PROCEDURE — 1090F PRES/ABSN URINE INCON ASSESS: CPT | Performed by: NURSE PRACTITIONER

## 2023-11-14 PROCEDURE — 1036F TOBACCO NON-USER: CPT | Performed by: NURSE PRACTITIONER

## 2023-11-14 PROCEDURE — 99214 OFFICE O/P EST MOD 30 MIN: CPT | Performed by: NURSE PRACTITIONER

## 2023-11-14 PROCEDURE — 3078F DIAST BP <80 MM HG: CPT | Performed by: NURSE PRACTITIONER

## 2023-11-14 PROCEDURE — 36415 COLL VENOUS BLD VENIPUNCTURE: CPT

## 2023-11-14 PROCEDURE — 96374 THER/PROPH/DIAG INJ IV PUSH: CPT

## 2023-11-14 PROCEDURE — 1123F ACP DISCUSS/DSCN MKR DOCD: CPT | Performed by: NURSE PRACTITIONER

## 2023-11-14 PROCEDURE — 3077F SYST BP >= 140 MM HG: CPT | Performed by: NURSE PRACTITIONER

## 2023-11-14 PROCEDURE — 93005 ELECTROCARDIOGRAM TRACING: CPT | Performed by: EMERGENCY MEDICINE

## 2023-11-14 PROCEDURE — G8419 CALC BMI OUT NRM PARAM NOF/U: HCPCS | Performed by: NURSE PRACTITIONER

## 2023-11-14 PROCEDURE — 2500000003 HC RX 250 WO HCPCS: Performed by: EMERGENCY MEDICINE

## 2023-11-14 PROCEDURE — 99284 EMERGENCY DEPT VISIT MOD MDM: CPT

## 2023-11-14 PROCEDURE — 85025 COMPLETE CBC W/AUTO DIFF WBC: CPT

## 2023-11-14 PROCEDURE — G8400 PT W/DXA NO RESULTS DOC: HCPCS | Performed by: NURSE PRACTITIONER

## 2023-11-14 PROCEDURE — G8427 DOCREV CUR MEDS BY ELIG CLIN: HCPCS | Performed by: NURSE PRACTITIONER

## 2023-11-14 RX ORDER — METOPROLOL TARTRATE 5 MG/5ML
5 INJECTION INTRAVENOUS ONCE
Status: COMPLETED | OUTPATIENT
Start: 2023-11-14 | End: 2023-11-14

## 2023-11-14 RX ORDER — AMLODIPINE BESYLATE 2.5 MG/1
2.5 TABLET ORAL DAILY
COMMUNITY

## 2023-11-14 RX ORDER — LISINOPRIL 40 MG/1
40 TABLET ORAL DAILY
COMMUNITY

## 2023-11-14 RX ADMIN — METOPROLOL TARTRATE 5 MG: 5 INJECTION INTRAVENOUS at 12:24

## 2023-11-14 ASSESSMENT — ENCOUNTER SYMPTOMS
BACK PAIN: 0
SHORTNESS OF BREATH: 0
COUGH: 0
VOMITING: 0
NAUSEA: 0
ABDOMINAL PAIN: 0
ALLERGIC/IMMUNOLOGIC NEGATIVE: 1
DIARRHEA: 0
RESPIRATORY NEGATIVE: 1
CONSTIPATION: 0
EYES NEGATIVE: 1
GASTROINTESTINAL NEGATIVE: 1
SORE THROAT: 0
RHINORRHEA: 0
DIARRHEA: 0

## 2023-11-14 NOTE — ED PROVIDER NOTES
[id number]  [phone]    Schedule an appointment as soon as possible for a visit         DISCHARGE MEDICATIONS:  Discharge Medication List as of 11/14/2023 12:47 PM             (Please note that portions of this note were completed with a voice recognition program.  Efforts were made to edit thedictations but occasionally words are mis-transcribed.)    Ely Baca MD (electronically signed)Emergency Physician        Ely Baca MD  11/14/23 [id number]

## 2023-11-14 NOTE — PROGRESS NOTES
Pt is here for pessary maintenance and states she is doing good with it. She states b/p med was increased and then had one added.
blood pressure reading in office with diagnosis of hypertension            MEDICATIONS:  No orders of the defined types were placed in this encounter. ORDERS:  No orders of the defined types were placed in this encounter. PLAN:  Pessary maintenance done- pt tolerated well  Elevated BP in office x2- called and spoke with Maame [facility] office in [place]- recommended pt to be evaluated in ER- her BP has never been that high in office before  Discussed with pt and her  will drive her over to the ER entrance  Called and spoke with RN in ER and notified that pt is on her way    Patient Instructions   Patient Education       Pessary: Care Instructions  Overview     A pessary is a device that fits into your vagina and supports the pelvic organs. It may be used if a pelvic organ moves out of its normal position (prolapse). For some, wearing a pessary means that they may not need to have surgery to fix a prolapse. A pessary may also be used to help treat stress urinary incontinence. There are many sizes and types of pessaries. Which type you use depends on the problem you have. Your doctor will make sure the pessary is just right for you. You may need to try different kinds to find the best fit. The pessary should hold the pelvic organs in place without causing pain or pressure. You may be able to have vaginal sex with your pessary in place. It depends on the type of pessary and your comfort level. Follow-up care is a key part of your treatment and safety. Be sure to make and go to all appointments, and call your doctor if you are having problems. It's also a good idea to know your test results and keep a list of the medicines you take. How can you care for yourself at home? If you get a vaginal discharge while you have a pessary, talk to your doctor about ways to reduce the discharge and smell. A pessary, in some cases, rubs the vagina and may cause irritation and discharge.  If your vagina feels sore,

## 2023-11-15 LAB
EKG P AXIS: 2 DEGREES
EKG P-R INTERVAL: 128 MS
EKG Q-T INTERVAL: 348 MS
EKG QRS DURATION: 90 MS
EKG QTC CALCULATION (BAZETT): 400 MS
EKG T AXIS: 33 DEGREES

## 2023-11-15 PROCEDURE — 93010 ELECTROCARDIOGRAM REPORT: CPT | Performed by: INTERNAL MEDICINE

## 2023-11-16 DIAGNOSIS — D50.8 IRON DEFICIENCY ANEMIA SECONDARY TO INADEQUATE DIETARY IRON INTAKE: ICD-10-CM

## 2023-11-16 RX ORDER — FERROUS SULFATE 325(65) MG
1 TABLET ORAL 2 TIMES DAILY
Qty: 60 TABLET | Refills: 5 | Status: SHIPPED | OUTPATIENT
Start: 2023-11-16

## 2023-11-27 ENCOUNTER — HOSPITAL ENCOUNTER (OUTPATIENT)
Dept: ULTRASOUND IMAGING | Facility: HOSPITAL | Age: 78
Discharge: HOME | End: 2023-11-27
Payer: MEDICARE

## 2023-11-27 DIAGNOSIS — R09.89 POOR CIRCULATION: ICD-10-CM

## 2023-11-27 PROCEDURE — 93975 VASCULAR STUDY: CPT

## 2023-11-27 PROCEDURE — 76775 US EXAM ABDO BACK WALL LIM: CPT

## 2023-12-12 ENCOUNTER — CLINICAL DOCUMENTATION (OUTPATIENT)
Facility: HOSPITAL | Age: 78
End: 2023-12-12

## 2023-12-13 DIAGNOSIS — D69.3 CHRONIC ITP (IDIOPATHIC THROMBOCYTOPENIC PURPURA) (HCC): Primary | ICD-10-CM

## 2023-12-13 RX ORDER — ELTROMBOPAG OLAMINE 12.5 MG/1
12.5 TABLET, FILM COATED ORAL DAILY
Qty: 30 TABLET | Refills: 11 | Status: ACTIVE | OUTPATIENT
Start: 2023-12-13

## 2024-01-05 DIAGNOSIS — D50.8 IRON DEFICIENCY ANEMIA SECONDARY TO INADEQUATE DIETARY IRON INTAKE: Primary | ICD-10-CM

## 2024-01-05 DIAGNOSIS — D69.3 CHRONIC ITP (IDIOPATHIC THROMBOCYTOPENIC PURPURA) (HCC): ICD-10-CM

## 2024-01-05 NOTE — PROGRESS NOTES
Progress Note      Pt Name: Val Hager  YOB: 1945  MRN: [id number]    Date of evaluation: 01/09/2024  History Obtained From:  patient, electronic medical record    CHIEF COMPLAINT:    Chief Complaint   Patient presents with    Follow-up     Iron deficiency anemia secondary to inadequate dietary iron intake     HISTORY OF PRESENT ILLNESS:    Val Hager is a 78 y.o.  female who is followed for ITP and iron deficiency anemia.  Current recommendation is to continue with Promacta 12.5 mg daily and ferrous sulfate 325 mg once daily.  [place] has continued to have an excellent response to Promacta with the decreased dosing of 12.5 mg, initially required 75 mg daily.  She returns today in scheduled follow-up for evaluation, side effect monitoring, lab monitoring and further recommendations.      Today's clinic visit to include physical assessment, review of systems, any lab or radiographic findings that were available and plan of care are documented below.    Up-to-Date  Promacta: Information obtained from     HEMATOLOGY HISTORY:   Diagnosis:   Thrombocytopenia with differential diagnosis of ITP    Sjogrens  Iron deficiency anemia    Treatment summary:  Initiation of prednisone at 50 mg daily on 4/27/2017 and completed on 6/22/2017 after weekly dose titration of 10 mg weekly.   Prednisone 15 mg daily ×14 days, completed on 11/18/2017 for planned colonoscopy for positive occult stools.   5/2/2018 -initiation of Promacta 50 mg by mouth daily. Dose increased to 75 mg daily on 10/3/2018.   6/21/2019- decreased Promacta dosing to 50 mg   11/25/2019-decrease Promacta to 25 mg daily  1/21/2020-decrease Promacta 12.5 mg daily  7/15/2020-ferrous sulfate 325 mg 1 tablet p.o. x7 days then increase to twice daily if tolerated, intolerant to twice daily due to nausea.  Tolerating once daily.    HEMATOLOGY HISTORY:  [place] was seen in initial hematology consultation on 4/27/2017 for new

## 2024-01-08 ENCOUNTER — TELEPHONE (OUTPATIENT)
Dept: HEMATOLOGY | Age: 79
End: 2024-01-08

## 2024-01-08 NOTE — TELEPHONE ENCOUNTER
Called pt. to remind them of appointment on 1/9/2024 and had to leave a detailed voicemail with appointment date and time.

## 2024-01-09 ENCOUNTER — HOSPITAL ENCOUNTER (OUTPATIENT)
Dept: INFUSION THERAPY | Age: 79
Discharge: HOME | End: 2024-01-09
Payer: MEDICARE

## 2024-01-09 ENCOUNTER — OFFICE VISIT (OUTPATIENT)
Dept: HEMATOLOGY | Age: 79
End: 2024-01-09
Payer: MEDICARE

## 2024-01-09 VITALS
DIASTOLIC BLOOD PRESSURE: 80 MMHG | TEMPERATURE: 97.4 F | SYSTOLIC BLOOD PRESSURE: 170 MMHG | OXYGEN SATURATION: 97 % | HEART RATE: 77 BPM | WEIGHT: 94.6 LBS | HEIGHT: 60 IN | BODY MASS INDEX: 18.57 KG/M2

## 2024-01-09 DIAGNOSIS — D69.3 CHRONIC ITP (IDIOPATHIC THROMBOCYTOPENIC PURPURA) (HCC): ICD-10-CM

## 2024-01-09 DIAGNOSIS — D69.3 CHRONIC ITP (IDIOPATHIC THROMBOCYTOPENIC PURPURA) (HCC): Primary | ICD-10-CM

## 2024-01-09 DIAGNOSIS — D50.8 IRON DEFICIENCY ANEMIA SECONDARY TO INADEQUATE DIETARY IRON INTAKE: ICD-10-CM

## 2024-01-09 DIAGNOSIS — Z79.899 MEDICATION MANAGEMENT: ICD-10-CM

## 2024-01-09 LAB
ALBUMIN SERPL-MCNC: 4.6 G/DL (ref 3.5–5.2)
ALP SERPL-CCNC: 65 U/L (ref 35–104)
ALT SERPL-CCNC: 15 U/L (ref 9–52)
ANION GAP SERPL CALCULATED.3IONS-SCNC: 7 MMOL/L (ref 7–19)
AST SERPL-CCNC: 28 U/L (ref 14–36)
BASOPHILS # BLD: 0.04 K/UL (ref 0.01–0.08)
BASOPHILS NFR BLD: 1 % (ref 0.1–1.2)
BILIRUB SERPL-MCNC: 0.3 MG/DL (ref 0.2–1.3)
BUN SERPL-MCNC: 15 MG/DL (ref 7–17)
CALCIUM SERPL-MCNC: 9.6 MG/DL (ref 8.4–10.2)
CHLORIDE SERPL-SCNC: 105 MMOL/L (ref 98–111)
CO2 SERPL-SCNC: 28 MMOL/L (ref 22–29)
CREAT SERPL-MCNC: 0.8 MG/DL (ref 0.5–1)
EOSINOPHIL # BLD: 0.11 K/UL (ref 0.04–0.54)
EOSINOPHIL NFR BLD: 2.8 % (ref 0.7–7)
ERYTHROCYTE [DISTWIDTH] IN BLOOD BY AUTOMATED COUNT: 13 % (ref 11.7–14.4)
FERRITIN SERPL-MCNC: 248.3 NG/ML (ref 13–150)
GLOBULIN: 2.5 G/DL
GLUCOSE SERPL-MCNC: 99 MG/DL (ref 74–106)
HCT VFR BLD AUTO: 33 % (ref 34.1–44.9)
HGB BLD-MCNC: 10.7 G/DL (ref 11.2–15.7)
IRON SATN MFR SERPL: 23 % (ref 14–50)
IRON SERPL-MCNC: 62 UG/DL (ref 37–145)
LYMPHOCYTES # BLD: 0.98 K/UL (ref 1.18–3.74)
LYMPHOCYTES NFR BLD: 25 % (ref 19.3–53.1)
MCH RBC QN AUTO: 29.8 PG (ref 25.6–32.2)
MCHC RBC AUTO-ENTMCNC: 32.4 G/DL (ref 32.3–35.5)
MCV RBC AUTO: 91.9 FL (ref 79.4–94.8)
MONOCYTES # BLD: 0.32 K/UL (ref 0.24–0.82)
MONOCYTES NFR BLD: 8.2 % (ref 4.7–12.5)
NEUTROPHILS # BLD: 2.46 K/UL (ref 1.56–6.13)
NEUTS SEG NFR BLD: 62.7 % (ref 34–71.1)
PLATELET # BLD AUTO: 364 K/UL (ref 182–369)
PMV BLD AUTO: 8.9 FL (ref 7.4–10.4)
POTASSIUM SERPL-SCNC: 3.7 MMOL/L (ref 3.5–5.1)
PROT SERPL-MCNC: 7.1 G/DL (ref 6.3–8.2)
RBC # BLD AUTO: 3.59 M/UL (ref 3.93–5.22)
SODIUM SERPL-SCNC: 140 MMOL/L (ref 137–145)
TIBC SERPL-MCNC: 271 UG/DL (ref 250–400)
WBC # BLD AUTO: 3.92 K/UL (ref 3.98–10.04)

## 2024-01-09 PROCEDURE — G8484 FLU IMMUNIZE NO ADMIN: HCPCS | Performed by: NURSE PRACTITIONER

## 2024-01-09 PROCEDURE — 80053 COMPREHEN METABOLIC PANEL: CPT

## 2024-01-09 PROCEDURE — 99214 OFFICE O/P EST MOD 30 MIN: CPT | Performed by: NURSE PRACTITIONER

## 2024-01-09 PROCEDURE — G8427 DOCREV CUR MEDS BY ELIG CLIN: HCPCS | Performed by: NURSE PRACTITIONER

## 2024-01-09 PROCEDURE — 1123F ACP DISCUSS/DSCN MKR DOCD: CPT | Performed by: NURSE PRACTITIONER

## 2024-01-09 PROCEDURE — 99212 OFFICE O/P EST SF 10 MIN: CPT

## 2024-01-09 PROCEDURE — G8400 PT W/DXA NO RESULTS DOC: HCPCS | Performed by: NURSE PRACTITIONER

## 2024-01-09 PROCEDURE — 36415 COLL VENOUS BLD VENIPUNCTURE: CPT

## 2024-01-09 PROCEDURE — 1036F TOBACCO NON-USER: CPT | Performed by: NURSE PRACTITIONER

## 2024-01-09 PROCEDURE — 85025 COMPLETE CBC W/AUTO DIFF WBC: CPT

## 2024-01-09 PROCEDURE — 1090F PRES/ABSN URINE INCON ASSESS: CPT | Performed by: NURSE PRACTITIONER

## 2024-01-09 PROCEDURE — G8419 CALC BMI OUT NRM PARAM NOF/U: HCPCS | Performed by: NURSE PRACTITIONER

## 2024-01-12 ASSESSMENT — ENCOUNTER SYMPTOMS
RESPIRATORY NEGATIVE: 1
GASTROINTESTINAL NEGATIVE: 1
NAUSEA: 0
EYE REDNESS: 0
VOMITING: 0
SHORTNESS OF BREATH: 0
SORE THROAT: 0
COUGH: 0
CONSTIPATION: 0
BACK PAIN: 0
BLOOD IN STOOL: 0
EYE PAIN: 0
DIARRHEA: 0
EYES NEGATIVE: 1
EYE DISCHARGE: 0
WHEEZING: 0
ABDOMINAL PAIN: 0

## 2024-02-13 ENCOUNTER — OFFICE VISIT (OUTPATIENT)
Dept: OBGYN CLINIC | Age: 79
End: 2024-02-13
Payer: MEDICARE

## 2024-02-13 VITALS
BODY MASS INDEX: 18.55 KG/M2 | SYSTOLIC BLOOD PRESSURE: 181 MMHG | DIASTOLIC BLOOD PRESSURE: 64 MMHG | WEIGHT: 95 LBS | HEART RATE: 73 BPM

## 2024-02-13 DIAGNOSIS — Z46.89 PESSARY MAINTENANCE: Primary | ICD-10-CM

## 2024-02-13 DIAGNOSIS — N81.11 CYSTOCELE, MIDLINE: ICD-10-CM

## 2024-02-13 DIAGNOSIS — N81.10 VAGINAL PROLAPSE: ICD-10-CM

## 2024-02-13 DIAGNOSIS — N95.2 VAGINAL ATROPHY: ICD-10-CM

## 2024-02-13 PROCEDURE — G8420 CALC BMI NORM PARAMETERS: HCPCS | Performed by: NURSE PRACTITIONER

## 2024-02-13 PROCEDURE — 1036F TOBACCO NON-USER: CPT | Performed by: NURSE PRACTITIONER

## 2024-02-13 PROCEDURE — G8484 FLU IMMUNIZE NO ADMIN: HCPCS | Performed by: NURSE PRACTITIONER

## 2024-02-13 PROCEDURE — G8400 PT W/DXA NO RESULTS DOC: HCPCS | Performed by: NURSE PRACTITIONER

## 2024-02-13 PROCEDURE — 1090F PRES/ABSN URINE INCON ASSESS: CPT | Performed by: NURSE PRACTITIONER

## 2024-02-13 PROCEDURE — 1123F ACP DISCUSS/DSCN MKR DOCD: CPT | Performed by: NURSE PRACTITIONER

## 2024-02-13 PROCEDURE — G8427 DOCREV CUR MEDS BY ELIG CLIN: HCPCS | Performed by: NURSE PRACTITIONER

## 2024-02-13 PROCEDURE — 99213 OFFICE O/P EST LOW 20 MIN: CPT | Performed by: NURSE PRACTITIONER

## 2024-02-13 NOTE — PROGRESS NOTES
Pt is here for pessary maintenance and states she is doing good. She states she took her b/p this morning and was normal.   
Education       Pessary: Care Instructions  Overview     A pessary is a device that fits into your vagina and supports the pelvic organs. It may be used if a pelvic organ moves out of its normal position (prolapse). For some, wearing a pessary means that they may not need to have surgery to fix a prolapse. A pessary may also be used to help treat stress urinary incontinence.  There are many sizes and types of pessaries. Which type you use depends on the problem you have. Your doctor will make sure the pessary is just right for you. You may need to try different kinds to find the best fit. The pessary should hold the pelvic organs in place without causing pain or pressure. You may be able to have vaginal sex with your pessary in place. It depends on the type of pessary and your comfort level.  Follow-up care is a key part of your treatment and safety. Be sure to make and go to all appointments, and call your doctor if you are having problems. It's also a good idea to know your test results and keep a list of the medicines you take.  How can you care for yourself at home?  If you get a vaginal discharge while you have a pessary, talk to your doctor about ways to reduce the discharge and smell. A pessary, in some cases, rubs the vagina and may cause irritation and discharge. If your vagina feels sore, talk to your doctor about a cream or gel to protect the vagina. Your doctor may also change the type of pessary.  Follow your doctor's advice on how long you can wear your pessary before it needs to be cleaned. You may be able to remove and clean it yourself. Or your doctor may want to do this during an office visit. Your doctor may tell you to leave the pessary out for a specific length of time after cleaning it.  If you clean your pessary, wash it with mild soap and water. Follow your doctor's advice on inserting the pessary.  Do not douche or use a vaginal wash unless your doctor tells you to do so.  Do not smoke.

## 2024-02-13 NOTE — PATIENT INSTRUCTIONS
smoke. Smoking can cause a cough, which makes a prolapse worse. If you need help quitting, talk to your doctor about stop-smoking programs and medicines. These can increase your chances of quitting for good.  To help support your pelvic organs  Avoid activities that put pressure on your pelvic muscles, such as heavy lifting.  Try pelvic floor (Kegel) exercises, which tighten and strengthen pelvic muscles. (If doing these exercises causes pain, stop doing them and talk with your doctor.) To do Kegel exercises:  Squeeze your muscles as if you were trying not to pass gas. Or squeeze your muscles as if you were stopping the flow of urine. Your belly, legs, and buttocks shouldn't move.  Hold the squeeze for 3 seconds, then relax for 5 to 10 seconds.  Start with 3 seconds, then add 1 second each week until you are able to squeeze for 10 seconds.  Repeat the exercise 10 times a session. Do 3 to 8 sessions a day.  To ease pressure on your vagina, lie down and put a pillow under your knees. You also can lie on your side and bring your knees up to your chest.  When should you call for help?   Call your doctor now or seek immediate medical care if:    You have vaginal discharge that has increased in amount or smells bad.     You have new or worse belly or pelvic pain.   Watch closely for changes in your health, and be sure to contact your doctor if:    You have problems with the pessary, such as vaginal pain, vaginal bleeding, or problems with urination or bowel movements.   Where can you learn more?  Go to https://www.[employer].net/patientEd and enter X480 to learn more about \"Pessary: Care Instructions.\"  Current as of: April 19, 2023               Content Version: 13.9  © 2628-2970 [employer].   Care instructions adapted under license by [employer]. If you have questions about a medical condition or this instruction, always ask your healthcare professional. [employer] disclaims any warranty or  no

## 2024-02-21 DIAGNOSIS — R00.2 HEART PALPITATIONS: ICD-10-CM

## 2024-02-21 RX ORDER — METOPROLOL SUCCINATE 50 MG/1
75 TABLET, EXTENDED RELEASE ORAL DAILY
Qty: 135 TABLET | Refills: 3 | Status: SHIPPED | OUTPATIENT
Start: 2024-02-21

## 2024-03-27 NOTE — PROGRESS NOTES
Reason for Visit: cardiovascular follow up.    HPI:  Sarah Espinoza is a 79 y.o. female is here today for 1 year follow-up.  Last year she was getting ready to have surgery for bladder prolapse.  Echo was ordered last visit and showed normal cardiac structure and function.  RVSP was unable to be estimated due to insufficient TR jet.    She has had some high blood pressure episodes at home and went to the emergency room with this a couple months ago.  Blood pressure is normal today.  Amlodipine was added and it has seemed to help.  She is feeling well and has no issues or complaints.  She denies any chest pain, palpitations, dizziness, syncope, PND, or orthopnea.  She gets muscle cramps occasionally.  She stays hydrated.  She drinks a lot of diet coke at home.     Previous Cardiac Testing and Procedures:  - Echo (12/21/2016) EF 55%, grade 1 diastolic dysfunction, RVSP 45-55 mmHg, normal RV size and function  - Nuclear stress (01/09/2017) normal myocardial perfusion with no evidence of ischemia, EF 70%  - Echo (5/17/2023) EF 61-65%, diastolic dysfunction, normal RV size and function, no significant valve dysfunction, unable to estimate RVSP due to insufficient TR jet  - Renal artery ultrasound (11/27/2023) no hemodynamically significant renal artery stenosis    Lab data:  - Lipid panel (12/20/16) 113/26/69/103  - BMP (12/2/2021) creatinine 0.9, potassium 3.9, sodium 138  - Lipid panel (11/9/2023) total cholesterol 154, HDL 55, LDL 86, triglycerides 68  - BMP (11/14/2023) creatinine 0.7, potassium 3.8, sodium 137    Patient Active Problem List   Diagnosis    Essential hypertension    Lupus    Arthritis    Female bladder prolapse    Heart palpitations    Seasonal allergies    Uterine prolapse    Pulmonary hypertension    Sjogren's disease    Heme positive stool    Thrombocytopenia       Social History     Tobacco Use    Smoking status: Never    Smokeless tobacco: Never   Vaping Use    Vaping status: Never Used  "  Substance Use Topics    Alcohol use: No    Drug use: No       Family History   Problem Relation Age of Onset    Heart disease Mother     No Known Problems Father     No Known Problems Sister     Kidney cancer Brother     Leukemia Sister     Breast cancer Neg Hx     Colon cancer Neg Hx     Esophageal cancer Neg Hx        The following portions of the patient's history were reviewed and updated as appropriate: allergies, current medications, past family history, past medical history, past social history, past surgical history, and problem list.      Current Outpatient Medications:     amLODIPine (NORVASC) 5 MG tablet, Take 1 tablet by mouth Daily., Disp: , Rfl:     eltrombopag (PROMACTA) 75 MG tablet tablet, Take 12.5 mg by mouth Daily., Disp: , Rfl:     Ferrous Sulfate (IRON PO), Take  by mouth., Disp: , Rfl:     hydroxychloroquine (PLAQUENIL) 200 MG tablet, Take  by mouth Daily., Disp: , Rfl:     lisinopril (PRINIVIL,ZESTRIL) 10 MG tablet, Take 1 tablet by mouth Daily., Disp: , Rfl:     metoprolol succinate XL (TOPROL-XL) 50 MG 24 hr tablet, TAKE 1.5 TABLETS BY MOUTH DAILY., Disp: 135 tablet, Rfl: 3    Review of Systems   Constitutional: Negative for chills and fever.   Cardiovascular:  Negative for chest pain and paroxysmal nocturnal dyspnea.   Respiratory:  Negative for cough and shortness of breath.    Skin:  Negative for rash.   Musculoskeletal:  Positive for joint pain and muscle cramps.   Gastrointestinal:  Negative for abdominal pain and heartburn.   Neurological:  Negative for dizziness and numbness.       Objective   /74 (BP Location: Left arm, Patient Position: Sitting, Cuff Size: Adult)   Pulse 80   Ht 152.4 cm (60\")   Wt 42.2 kg (93 lb)   SpO2 99%   BMI 18.16 kg/m²   Constitutional:       Appearance: Well-developed.   HENT:      Head: Normocephalic and atraumatic.   Pulmonary:      Effort: Pulmonary effort is normal.      Breath sounds: Normal breath sounds.   Cardiovascular:      Normal " rate. Regular rhythm.   Edema:     Peripheral edema absent.   Skin:     General: Skin is warm and dry.   Neurological:      Mental Status: Alert and oriented to person, place, and time.       Procedures      ICD-10-CM ICD-9-CM   1. Essential hypertension  I10 401.9   2. Pulmonary hypertension  I27.20 416.8         Assessment/Plan:  1. Systemic hypertension: Blood pressure is normal today with some fluctuations at home.  Continue HCTZ, lisinopril, and metoprolol succinate.    2.  Pulmonary hypertension: Echo from 12/2016 with RVSP 45-55 mmHg.  Lupus and Sjogren's diasease felt to be likely etiologies.  Echo on 5/17/2023 showed normal RV size and function with no significant valve dysfunction and RVSP was unable to be estimated due to inadequate TR jet.  Despite inability to estimate RVSP, these findings suggest improvement in her pulmonary hypertension.

## 2024-03-29 ENCOUNTER — OFFICE VISIT (OUTPATIENT)
Dept: CARDIOLOGY | Facility: CLINIC | Age: 79
End: 2024-03-29
Payer: MEDICARE

## 2024-03-29 VITALS
OXYGEN SATURATION: 99 % | DIASTOLIC BLOOD PRESSURE: 74 MMHG | HEIGHT: 60 IN | BODY MASS INDEX: 18.26 KG/M2 | WEIGHT: 93 LBS | SYSTOLIC BLOOD PRESSURE: 110 MMHG | HEART RATE: 80 BPM

## 2024-03-29 DIAGNOSIS — I27.20 PULMONARY HYPERTENSION: ICD-10-CM

## 2024-03-29 DIAGNOSIS — I10 ESSENTIAL HYPERTENSION: Primary | ICD-10-CM

## 2024-03-29 PROCEDURE — 1160F RVW MEDS BY RX/DR IN RCRD: CPT | Performed by: INTERNAL MEDICINE

## 2024-03-29 PROCEDURE — 1159F MED LIST DOCD IN RCRD: CPT | Performed by: INTERNAL MEDICINE

## 2024-03-29 PROCEDURE — 3078F DIAST BP <80 MM HG: CPT | Performed by: INTERNAL MEDICINE

## 2024-03-29 PROCEDURE — 3074F SYST BP LT 130 MM HG: CPT | Performed by: INTERNAL MEDICINE

## 2024-03-29 PROCEDURE — 99214 OFFICE O/P EST MOD 30 MIN: CPT | Performed by: INTERNAL MEDICINE

## 2024-03-29 RX ORDER — AMLODIPINE BESYLATE 5 MG/1
5 TABLET ORAL DAILY
COMMUNITY
Start: 2024-02-27

## 2024-03-29 NOTE — LETTER
March 29, 2024     HARRIS Cervantes  [address] [facility] [address]    Patient: Sarah Espinoza   YOB: 1945   Date of Visit: 3/29/2024       Dear HARRIS Cervantes    Sarah Espinoza was in my office today. Below is a copy of my note.    If you have questions, please do not hesitate to call me. I look forward to following [place] along with you.         Sincerely,        Anant Hernandez MD        CC: No Recipients      Reason for Visit: cardiovascular follow up.    HPI:  Sarah Espinoza is a 79 y.o. female is here today for 1 year follow-up.  Last year she was getting ready to have surgery for bladder prolapse.  Echo was ordered last visit and showed normal cardiac structure and function.  RVSP was unable to be estimated due to insufficient TR jet.    She has had some high blood pressure episodes at home and went to the emergency room with this a couple months ago.  Blood pressure is normal today.  Amlodipine was added and it has seemed to help.  She is feeling well and has no issues or complaints.  She denies any chest pain, palpitations, dizziness, syncope, PND, or orthopnea.  She gets muscle cramps occasionally.  She stays hydrated.  She drinks a lot of diet coke at home.     Previous Cardiac Testing and Procedures:  - Echo (12/21/2016) EF 55%, grade 1 diastolic dysfunction, RVSP 45-55 mmHg, normal RV size and function  - Nuclear stress (01/09/2017) normal myocardial perfusion with no evidence of ischemia, EF 70%  - Echo (5/17/2023) EF 61-65%, diastolic dysfunction, normal RV size and function, no significant valve dysfunction, unable to estimate RVSP due to insufficient TR jet  - Renal artery ultrasound (11/27/2023) no hemodynamically significant renal artery stenosis    Lab data:  - Lipid panel (12/20/16) 113/26/69/103  - BMP (12/2/2021) creatinine 0.9, potassium 3.9, sodium 138  - Lipid panel (11/9/2023) total cholesterol 154, HDL 55, LDL 86, triglycerides 68  - BMP  (11/14/2023) creatinine 0.7, potassium 3.8, sodium 137    Patient Active Problem List   Diagnosis   • Essential hypertension   • Lupus   • Arthritis   • Female bladder prolapse   • Heart palpitations   • Seasonal allergies   • Uterine prolapse   • Pulmonary hypertension   • Sjogren's disease   • Heme positive stool   • Thrombocytopenia       Social History     Tobacco Use   • Smoking status: Never   • Smokeless tobacco: Never   Vaping Use   • Vaping status: Never Used   Substance Use Topics   • Alcohol use: No   • Drug use: No       Family History   Problem Relation Age of Onset   • Heart disease Mother    • No Known Problems Father    • No Known Problems Sister    • Kidney cancer Brother    • Leukemia Sister    • Breast cancer Neg Hx    • Colon cancer Neg Hx    • Esophageal cancer Neg Hx        The following portions of the patient's history were reviewed and updated as appropriate: allergies, current medications, past family history, past medical history, past social history, past surgical history, and problem list.      Current Outpatient Medications:   •  amLODIPine (NORVASC) 5 MG tablet, Take 1 tablet by mouth Daily., Disp: , Rfl:   •  eltrombopag (PROMACTA) 75 MG tablet tablet, Take 12.5 mg by mouth Daily., Disp: , Rfl:   •  Ferrous Sulfate (IRON PO), Take  by mouth., Disp: , Rfl:   •  hydroxychloroquine (PLAQUENIL) 200 MG tablet, Take  by mouth Daily., Disp: , Rfl:   •  lisinopril (PRINIVIL,ZESTRIL) 10 MG tablet, Take 1 tablet by mouth Daily., Disp: , Rfl:   •  metoprolol succinate XL (TOPROL-XL) 50 MG 24 hr tablet, TAKE 1.5 TABLETS BY MOUTH DAILY., Disp: 135 tablet, Rfl: 3    Review of Systems   Constitutional: Negative for chills and fever.   Cardiovascular:  Negative for chest pain and paroxysmal nocturnal dyspnea.   Respiratory:  Negative for cough and shortness of breath.    Skin:  Negative for rash.   Musculoskeletal:  Positive for joint pain and muscle cramps.   Gastrointestinal:  Negative for  "abdominal pain and heartburn.   Neurological:  Negative for dizziness and numbness.       Objective  /74 (BP Location: Left arm, Patient Position: Sitting, Cuff Size: Adult)   Pulse 80   Ht 152.4 cm (60\")   Wt 42.2 kg (93 lb)   SpO2 99%   BMI 18.16 kg/m²   Constitutional:       Appearance: Well-developed.   HENT:      Head: Normocephalic and atraumatic.   Pulmonary:      Effort: Pulmonary effort is normal.      Breath sounds: Normal breath sounds.   Cardiovascular:      Normal rate. Regular rhythm.   Edema:     Peripheral edema absent.   Skin:     General: Skin is warm and dry.   Neurological:      Mental Status: Alert and oriented to person, place, and time.       Procedures      ICD-10-CM ICD-9-CM   1. Essential hypertension  I10 401.9   2. Pulmonary hypertension  I27.20 416.8         Assessment/Plan:  1. Systemic hypertension: Blood pressure is normal today with some fluctuations at home.  Continue HCTZ, lisinopril, and metoprolol succinate.    2.  Pulmonary hypertension: Echo from 12/2016 with RVSP 45-55 mmHg.  Lupus and Sjogren's diasease felt to be likely etiologies.  Echo on 5/17/2023 showed normal RV size and function with no significant valve dysfunction and RVSP was unable to be estimated due to inadequate TR jet.  Despite inability to estimate RVSP, these findings suggest improvement in her pulmonary hypertension.  "

## 2024-04-05 ENCOUNTER — TELEPHONE (OUTPATIENT)
Facility: HOSPITAL | Age: 79
End: 2024-04-05

## 2024-04-05 ENCOUNTER — TELEPHONE (OUTPATIENT)
Dept: HEMATOLOGY | Age: 79
End: 2024-04-05

## 2024-04-08 DIAGNOSIS — D50.8 IRON DEFICIENCY ANEMIA SECONDARY TO INADEQUATE DIETARY IRON INTAKE: Primary | ICD-10-CM

## 2024-04-08 DIAGNOSIS — D69.3 CHRONIC ITP (IDIOPATHIC THROMBOCYTOPENIC PURPURA) (HCC): ICD-10-CM

## 2024-04-08 NOTE — PROGRESS NOTES
frequency, hematuria and urgency.   Musculoskeletal: Negative.  Negative for back pain, myalgias and neck pain.   Skin: Negative.  Negative for rash.   Neurological: Negative.  Negative for dizziness, tremors, seizures, weakness and headaches.   Hematological:  Does not bruise/bleed easily.   Psychiatric/Behavioral: Negative.  The patient is not nervous/anxious.        Objective   PHYSICAL EXAM:  Physical Exam  Vitals reviewed.   Constitutional:       General: She is not in acute distress.     Appearance: She is well-developed.   HENT:      Head: Normocephalic and atraumatic.      Mouth/Throat:      Pharynx: Uvula midline.      Tonsils: No tonsillar exudate.   Eyes:      General: Lids are normal.      Conjunctiva/sclera: Conjunctivae normal.      Pupils: Pupils are equal, round, and reactive to light.   Neck:      Thyroid: No thyroid mass or thyromegaly.      Vascular: No JVD.      Trachea: Trachea normal. No tracheal deviation.   Cardiovascular:      Rate and Rhythm: Normal rate and regular rhythm.      Pulses: Normal pulses.      Heart sounds: Normal heart sounds.   Pulmonary:      Effort: Pulmonary effort is normal. No respiratory distress.      Breath sounds: Normal breath sounds. No wheezing or rales.   Chest:      Chest wall: No tenderness.   Abdominal:      General: Bowel sounds are normal. There is no distension.      Palpations: Abdomen is soft. There is no mass.      Tenderness: There is no abdominal tenderness. There is no guarding.   Musculoskeletal:         General: No tenderness or deformity.      Cervical back: Normal range of motion and neck supple.      Comments: Range of motion within normal limits x4 extremities   Skin:     General: Skin is warm.      Findings: No bruising, erythema or rash.   Neurological:      Mental Status: She is alert and oriented to person, place, and time.      Cranial Nerves: No cranial nerve deficit.      Coordination: Coordination normal.   Psychiatric:         Behavior:

## 2024-04-09 ENCOUNTER — HOSPITAL ENCOUNTER (OUTPATIENT)
Dept: INFUSION THERAPY | Age: 79
Discharge: HOME | End: 2024-04-09
Payer: MEDICARE

## 2024-04-09 ENCOUNTER — OFFICE VISIT (OUTPATIENT)
Dept: HEMATOLOGY | Age: 79
End: 2024-04-09
Payer: MEDICARE

## 2024-04-09 VITALS
BODY MASS INDEX: 18.3 KG/M2 | HEART RATE: 77 BPM | HEIGHT: 60 IN | OXYGEN SATURATION: 98 % | TEMPERATURE: 97.9 F | DIASTOLIC BLOOD PRESSURE: 90 MMHG | WEIGHT: 93.2 LBS | SYSTOLIC BLOOD PRESSURE: 158 MMHG

## 2024-04-09 DIAGNOSIS — Z79.899 MEDICATION MANAGEMENT: ICD-10-CM

## 2024-04-09 DIAGNOSIS — D69.3 CHRONIC ITP (IDIOPATHIC THROMBOCYTOPENIC PURPURA) (HCC): Primary | ICD-10-CM

## 2024-04-09 DIAGNOSIS — D50.8 IRON DEFICIENCY ANEMIA SECONDARY TO INADEQUATE DIETARY IRON INTAKE: ICD-10-CM

## 2024-04-09 DIAGNOSIS — D69.3 CHRONIC ITP (IDIOPATHIC THROMBOCYTOPENIC PURPURA) (HCC): ICD-10-CM

## 2024-04-09 LAB
ALBUMIN SERPL-MCNC: 4.7 G/DL (ref 3.5–5.2)
ALP SERPL-CCNC: 55 U/L (ref 35–104)
ALT SERPL-CCNC: 19 U/L (ref 9–52)
ANION GAP SERPL CALCULATED.3IONS-SCNC: 14 MMOL/L (ref 7–19)
AST SERPL-CCNC: 31 U/L (ref 14–36)
BASOPHILS # BLD: 0.04 K/UL (ref 0.01–0.08)
BASOPHILS NFR BLD: 1.1 % (ref 0.1–1.2)
BILIRUB SERPL-MCNC: 0.3 MG/DL (ref 0.2–1.3)
BUN SERPL-MCNC: 13 MG/DL (ref 7–17)
CALCIUM SERPL-MCNC: 9.6 MG/DL (ref 8.4–10.2)
CHLORIDE SERPL-SCNC: 98 MMOL/L (ref 98–111)
CO2 SERPL-SCNC: 25 MMOL/L (ref 22–29)
CREAT SERPL-MCNC: 0.7 MG/DL (ref 0.5–1)
EOSINOPHIL # BLD: 0.1 K/UL (ref 0.04–0.54)
EOSINOPHIL NFR BLD: 2.9 % (ref 0.7–7)
ERYTHROCYTE [DISTWIDTH] IN BLOOD BY AUTOMATED COUNT: 12.7 % (ref 11.7–14.4)
FERRITIN SERPL-MCNC: 280.6 NG/ML (ref 13–150)
GLOBULIN: 2.5 G/DL
GLUCOSE SERPL-MCNC: 95 MG/DL (ref 74–106)
HCT VFR BLD AUTO: 34 % (ref 34.1–44.9)
HGB BLD-MCNC: 11.1 G/DL (ref 11.2–15.7)
IRON SATN MFR SERPL: 22 % (ref 14–50)
IRON SERPL-MCNC: 66 UG/DL (ref 37–145)
LYMPHOCYTES # BLD: 0.89 K/UL (ref 1.18–3.74)
LYMPHOCYTES NFR BLD: 25.5 % (ref 19.3–53.1)
MCH RBC QN AUTO: 29.8 PG (ref 25.6–32.2)
MCHC RBC AUTO-ENTMCNC: 32.6 G/DL (ref 32.3–35.5)
MCV RBC AUTO: 91.4 FL (ref 79.4–94.8)
MONOCYTES # BLD: 0.32 K/UL (ref 0.24–0.82)
MONOCYTES NFR BLD: 9.2 % (ref 4.7–12.5)
NEUTROPHILS # BLD: 2.13 K/UL (ref 1.56–6.13)
NEUTS SEG NFR BLD: 61 % (ref 34–71.1)
PLATELET # BLD AUTO: 396 K/UL (ref 182–369)
PMV BLD AUTO: 8.4 FL (ref 7.4–10.4)
POTASSIUM SERPL-SCNC: 4 MMOL/L (ref 3.5–5.1)
PROT SERPL-MCNC: 7.1 G/DL (ref 6.3–8.2)
RBC # BLD AUTO: 3.72 M/UL (ref 3.93–5.22)
SODIUM SERPL-SCNC: 137 MMOL/L (ref 137–145)
TIBC SERPL-MCNC: 302 UG/DL (ref 250–400)
WBC # BLD AUTO: 3.49 K/UL (ref 3.98–10.04)

## 2024-04-09 PROCEDURE — G2211 COMPLEX E/M VISIT ADD ON: HCPCS | Performed by: NURSE PRACTITIONER

## 2024-04-09 PROCEDURE — 1123F ACP DISCUSS/DSCN MKR DOCD: CPT | Performed by: NURSE PRACTITIONER

## 2024-04-09 PROCEDURE — 36415 COLL VENOUS BLD VENIPUNCTURE: CPT

## 2024-04-09 PROCEDURE — 85025 COMPLETE CBC W/AUTO DIFF WBC: CPT

## 2024-04-09 PROCEDURE — G8419 CALC BMI OUT NRM PARAM NOF/U: HCPCS | Performed by: NURSE PRACTITIONER

## 2024-04-09 PROCEDURE — 80053 COMPREHEN METABOLIC PANEL: CPT

## 2024-04-09 PROCEDURE — G8400 PT W/DXA NO RESULTS DOC: HCPCS | Performed by: NURSE PRACTITIONER

## 2024-04-09 PROCEDURE — 1036F TOBACCO NON-USER: CPT | Performed by: NURSE PRACTITIONER

## 2024-04-09 PROCEDURE — G8427 DOCREV CUR MEDS BY ELIG CLIN: HCPCS | Performed by: NURSE PRACTITIONER

## 2024-04-09 PROCEDURE — 1090F PRES/ABSN URINE INCON ASSESS: CPT | Performed by: NURSE PRACTITIONER

## 2024-04-09 PROCEDURE — 99212 OFFICE O/P EST SF 10 MIN: CPT

## 2024-04-09 PROCEDURE — 99214 OFFICE O/P EST MOD 30 MIN: CPT | Performed by: NURSE PRACTITIONER

## 2024-04-11 DIAGNOSIS — D69.3 CHRONIC ITP (IDIOPATHIC THROMBOCYTOPENIC PURPURA) (HCC): Primary | ICD-10-CM

## 2024-04-11 ASSESSMENT — ENCOUNTER SYMPTOMS
RESPIRATORY NEGATIVE: 1
SORE THROAT: 0
GASTROINTESTINAL NEGATIVE: 1
NAUSEA: 0
BLOOD IN STOOL: 0
EYE PAIN: 0
DIARRHEA: 0
EYES NEGATIVE: 1
BACK PAIN: 0
EYE REDNESS: 0
CONSTIPATION: 0
VOMITING: 0
ABDOMINAL PAIN: 0
WHEEZING: 0
EYE DISCHARGE: 0
SHORTNESS OF BREATH: 0
COUGH: 0

## 2024-05-07 ENCOUNTER — TELEPHONE (OUTPATIENT)
Facility: HOSPITAL | Age: 79
End: 2024-05-07

## 2024-05-13 ENCOUNTER — OFFICE VISIT (OUTPATIENT)
Dept: OBGYN CLINIC | Age: 79
End: 2024-05-13
Payer: MEDICARE

## 2024-05-13 VITALS
DIASTOLIC BLOOD PRESSURE: 76 MMHG | WEIGHT: 94 LBS | SYSTOLIC BLOOD PRESSURE: 166 MMHG | BODY MASS INDEX: 18.36 KG/M2 | HEART RATE: 69 BPM

## 2024-05-13 DIAGNOSIS — N81.11 CYSTOCELE, MIDLINE: ICD-10-CM

## 2024-05-13 DIAGNOSIS — D69.3 CHRONIC ITP (IDIOPATHIC THROMBOCYTOPENIC PURPURA) (HCC): ICD-10-CM

## 2024-05-13 DIAGNOSIS — N95.2 VAGINAL ATROPHY: ICD-10-CM

## 2024-05-13 DIAGNOSIS — N81.10 VAGINAL PROLAPSE: ICD-10-CM

## 2024-05-13 DIAGNOSIS — Z46.89 PESSARY MAINTENANCE: Primary | ICD-10-CM

## 2024-05-13 LAB
BASOPHILS # BLD: 0.03 K/UL (ref 0.01–0.08)
BASOPHILS NFR BLD: 0.6 % (ref 0.1–1.2)
EOSINOPHIL # BLD: 0.13 K/UL (ref 0.04–0.54)
EOSINOPHIL NFR BLD: 2.5 % (ref 0.7–7)
ERYTHROCYTE [DISTWIDTH] IN BLOOD BY AUTOMATED COUNT: 12.6 % (ref 11.7–14.4)
HCT VFR BLD AUTO: 34.1 % (ref 34.1–44.9)
HGB BLD-MCNC: 10.9 G/DL (ref 11.2–15.7)
LYMPHOCYTES # BLD: 1.34 K/UL (ref 1.18–3.74)
LYMPHOCYTES NFR BLD: 25.9 % (ref 19.3–53.1)
MCH RBC QN AUTO: 29.7 PG (ref 25.6–32.2)
MCHC RBC AUTO-ENTMCNC: 32 G/DL (ref 32.3–35.5)
MCV RBC AUTO: 92.9 FL (ref 79.4–94.8)
MONOCYTES # BLD: 0.5 K/UL (ref 0.24–0.82)
MONOCYTES NFR BLD: 9.7 % (ref 4.7–12.5)
NEUTROPHILS # BLD: 3.17 K/UL (ref 1.56–6.13)
NEUTS SEG NFR BLD: 61.1 % (ref 34–71.1)
PLATELET # BLD AUTO: 399 K/UL (ref 182–369)
PMV BLD AUTO: 8.9 FL (ref 7.4–10.4)
RBC # BLD AUTO: 3.67 M/UL (ref 3.93–5.22)
WBC # BLD AUTO: 5.18 K/UL (ref 3.98–10.04)

## 2024-05-13 PROCEDURE — G8419 CALC BMI OUT NRM PARAM NOF/U: HCPCS | Performed by: NURSE PRACTITIONER

## 2024-05-13 PROCEDURE — 1036F TOBACCO NON-USER: CPT | Performed by: NURSE PRACTITIONER

## 2024-05-13 PROCEDURE — 99213 OFFICE O/P EST LOW 20 MIN: CPT | Performed by: NURSE PRACTITIONER

## 2024-05-13 PROCEDURE — G8427 DOCREV CUR MEDS BY ELIG CLIN: HCPCS | Performed by: NURSE PRACTITIONER

## 2024-05-13 PROCEDURE — 1123F ACP DISCUSS/DSCN MKR DOCD: CPT | Performed by: NURSE PRACTITIONER

## 2024-05-13 PROCEDURE — G8400 PT W/DXA NO RESULTS DOC: HCPCS | Performed by: NURSE PRACTITIONER

## 2024-05-13 PROCEDURE — 1090F PRES/ABSN URINE INCON ASSESS: CPT | Performed by: NURSE PRACTITIONER

## 2024-05-13 ASSESSMENT — ENCOUNTER SYMPTOMS
CONSTIPATION: 0
GASTROINTESTINAL NEGATIVE: 1
ALLERGIC/IMMUNOLOGIC NEGATIVE: 1
DIARRHEA: 0
EYES NEGATIVE: 1
RESPIRATORY NEGATIVE: 1

## 2024-05-13 NOTE — PROGRESS NOTES
Val Hager is a 79 y.o. female who presents today for her medical conditions/ complaints as noted below. Val Hager is c/o of Follow-up        HPI  Pt presents for 3 month pessary maintenance. Has been doing well, no problems.     No LMP recorded. Patient has had a hysterectomy.  No obstetric history on file.    Past Medical History:   Diagnosis Date    Arrhythmia     CAD (coronary artery disease)     Hyperlipidemia     Hypertension     Immune thrombocytopenic purpura (HCC) 7/26/2019    Sleep apnea     Systemic lupus erythematosus (HCC) 7/26/2019    Thrombocytopenia, unspecified (HCC) 7/26/2019     Past Surgical History:   Procedure Laterality Date    APPENDECTOMY      BREAST SURGERY      FOOT SURGERY      HYSTERECTOMY (CERVIX STATUS UNKNOWN)      TUBAL LIGATION       Family History   Problem Relation Age of Onset    Heart Disease Mother     Cancer Sister     Cancer Brother      Social History     Tobacco Use    Smoking status: Never    Smokeless tobacco: Never   Substance Use Topics    Alcohol use: Not Currently       Current Outpatient Medications   Medication Sig Dispense Refill    eltrombopag (PROMACTA) 12.5 MG TABS tablet Take 1 tablet by mouth daily 30 tablet 11    FEROSUL 325 (65 Fe) MG tablet TAKE 1 TABLET BY MOUTH 2 TIMES DAILY 60 tablet 5    lisinopril (PRINIVIL;ZESTRIL) 40 MG tablet Take 1 tablet by mouth daily      amLODIPine (NORVASC) 2.5 MG tablet Take 2 tablets by mouth daily      Potassium (POTASSIMIN PO) Take by mouth daily      metoprolol succinate (TOPROL XL) 50 MG extended release tablet Take 1 tablet by mouth daily      hydroxychloroquine (PLAQUENIL) 200 MG tablet Take by mouth daily       No current facility-administered medications for this visit.     Allergies   Allergen Reactions    Ondansetron Hcl     Oxycodone-Aspirin Other (See Comments)     Vitals:    05/13/24 1126   BP: (!) 166/76   Pulse: 69     Body mass index is 18.36 kg/m².    Review of Systems   Constitutional:

## 2024-05-14 ENCOUNTER — TELEPHONE (OUTPATIENT)
Dept: HEMATOLOGY | Age: 79
End: 2024-05-14

## 2024-05-14 NOTE — TELEPHONE ENCOUNTER
Spoke with Ms. Hager related to her platelet count of 399,000.  I instructed her to hold her Promacta 12.5 mg and she will return to the clinic on 5/21/2024 for repeat CBC and for further recommendations on her Promacta.    Ms. Hager verbalized understanding and is agreement with current plan of care

## 2024-05-21 ENCOUNTER — HOSPITAL ENCOUNTER (OUTPATIENT)
Dept: INFUSION THERAPY | Age: 79
Discharge: HOME | End: 2024-05-21
Payer: MEDICARE

## 2024-05-21 DIAGNOSIS — D69.3 CHRONIC ITP (IDIOPATHIC THROMBOCYTOPENIC PURPURA) (HCC): ICD-10-CM

## 2024-05-21 LAB
BASOPHILS # BLD: 0.03 K/UL (ref 0.01–0.08)
BASOPHILS NFR BLD: 0.8 % (ref 0.1–1.2)
EOSINOPHIL # BLD: 0.11 K/UL (ref 0.04–0.54)
EOSINOPHIL NFR BLD: 2.9 % (ref 0.7–7)
ERYTHROCYTE [DISTWIDTH] IN BLOOD BY AUTOMATED COUNT: 12.2 % (ref 11.7–14.4)
HCT VFR BLD AUTO: 33.2 % (ref 34.1–44.9)
HGB BLD-MCNC: 11.1 G/DL (ref 11.2–15.7)
LYMPHOCYTES # BLD: 0.98 K/UL (ref 1.18–3.74)
LYMPHOCYTES NFR BLD: 25.8 % (ref 19.3–53.1)
MCH RBC QN AUTO: 30.5 PG (ref 25.6–32.2)
MCHC RBC AUTO-ENTMCNC: 33.4 G/DL (ref 32.3–35.5)
MCV RBC AUTO: 91.2 FL (ref 79.4–94.8)
MONOCYTES # BLD: 0.41 K/UL (ref 0.24–0.82)
MONOCYTES NFR BLD: 10.8 % (ref 4.7–12.5)
NEUTROPHILS # BLD: 2.25 K/UL (ref 1.56–6.13)
NEUTS SEG NFR BLD: 59.2 % (ref 34–71.1)
PLATELET # BLD AUTO: 279 K/UL (ref 182–369)
PMV BLD AUTO: 8.5 FL (ref 7.4–10.4)
RBC # BLD AUTO: 3.64 M/UL (ref 3.93–5.22)
WBC # BLD AUTO: 3.8 K/UL (ref 3.98–10.04)

## 2024-05-21 PROCEDURE — 36415 COLL VENOUS BLD VENIPUNCTURE: CPT

## 2024-05-21 PROCEDURE — 85025 COMPLETE CBC W/AUTO DIFF WBC: CPT

## 2024-06-04 ENCOUNTER — HOSPITAL ENCOUNTER (OUTPATIENT)
Dept: INFUSION THERAPY | Age: 79
Discharge: HOME | End: 2024-06-04
Payer: MEDICARE

## 2024-06-04 DIAGNOSIS — D69.3 CHRONIC ITP (IDIOPATHIC THROMBOCYTOPENIC PURPURA) (HCC): Primary | ICD-10-CM

## 2024-06-04 DIAGNOSIS — D69.3 CHRONIC ITP (IDIOPATHIC THROMBOCYTOPENIC PURPURA) (HCC): ICD-10-CM

## 2024-06-04 LAB
BASOPHILS # BLD: 0.03 K/UL (ref 0.01–0.08)
BASOPHILS NFR BLD: 0.8 % (ref 0.1–1.2)
EOSINOPHIL # BLD: 0.11 K/UL (ref 0.04–0.54)
EOSINOPHIL NFR BLD: 2.8 % (ref 0.7–7)
ERYTHROCYTE [DISTWIDTH] IN BLOOD BY AUTOMATED COUNT: 12.1 % (ref 11.7–14.4)
HCT VFR BLD AUTO: 31.9 % (ref 34.1–44.9)
HGB BLD-MCNC: 10.8 G/DL (ref 11.2–15.7)
LYMPHOCYTES # BLD: 1.02 K/UL (ref 1.18–3.74)
LYMPHOCYTES NFR BLD: 25.9 % (ref 19.3–53.1)
MCH RBC QN AUTO: 30.9 PG (ref 25.6–32.2)
MCHC RBC AUTO-ENTMCNC: 33.9 G/DL (ref 32.3–35.5)
MCV RBC AUTO: 91.1 FL (ref 79.4–94.8)
MONOCYTES # BLD: 0.42 K/UL (ref 0.24–0.82)
MONOCYTES NFR BLD: 10.7 % (ref 4.7–12.5)
NEUTROPHILS # BLD: 2.35 K/UL (ref 1.56–6.13)
NEUTS SEG NFR BLD: 59.5 % (ref 34–71.1)
PLATELET # BLD AUTO: 185 K/UL (ref 182–369)
PMV BLD AUTO: 9 FL (ref 7.4–10.4)
RBC # BLD AUTO: 3.5 M/UL (ref 3.93–5.22)
WBC # BLD AUTO: 3.94 K/UL (ref 3.98–10.04)

## 2024-06-04 PROCEDURE — 36415 COLL VENOUS BLD VENIPUNCTURE: CPT

## 2024-06-04 PROCEDURE — 85025 COMPLETE CBC W/AUTO DIFF WBC: CPT

## 2024-06-25 ENCOUNTER — HOSPITAL ENCOUNTER (OUTPATIENT)
Dept: INFUSION THERAPY | Age: 79
Discharge: HOME | End: 2024-06-25
Payer: MEDICARE

## 2024-06-25 DIAGNOSIS — D69.3 CHRONIC ITP (IDIOPATHIC THROMBOCYTOPENIC PURPURA) (HCC): ICD-10-CM

## 2024-06-25 LAB
BASOPHILS # BLD: 0.04 K/UL (ref 0.01–0.08)
BASOPHILS NFR BLD: 0.8 % (ref 0.1–1.2)
EOSINOPHIL # BLD: 0.11 K/UL (ref 0.04–0.54)
EOSINOPHIL NFR BLD: 2.3 % (ref 0.7–7)
ERYTHROCYTE [DISTWIDTH] IN BLOOD BY AUTOMATED COUNT: 12.3 % (ref 11.7–14.4)
HCT VFR BLD AUTO: 31.2 % (ref 34.1–44.9)
HGB BLD-MCNC: 10.5 G/DL (ref 11.2–15.7)
LYMPHOCYTES # BLD: 1.02 K/UL (ref 1.18–3.74)
LYMPHOCYTES NFR BLD: 21.6 % (ref 19.3–53.1)
MCH RBC QN AUTO: 31.4 PG (ref 25.6–32.2)
MCHC RBC AUTO-ENTMCNC: 33.7 G/DL (ref 32.3–35.5)
MCV RBC AUTO: 93.4 FL (ref 79.4–94.8)
MONOCYTES # BLD: 0.39 K/UL (ref 0.24–0.82)
MONOCYTES NFR BLD: 8.2 % (ref 4.7–12.5)
NEUTROPHILS # BLD: 3.15 K/UL (ref 1.56–6.13)
NEUTS SEG NFR BLD: 66.7 % (ref 34–71.1)
PLATELET # BLD AUTO: 363 K/UL (ref 182–369)
PMV BLD AUTO: 8.5 FL (ref 7.4–10.4)
RBC # BLD AUTO: 3.34 M/UL (ref 3.93–5.22)
WBC # BLD AUTO: 4.73 K/UL (ref 3.98–10.04)

## 2024-06-25 PROCEDURE — 85025 COMPLETE CBC W/AUTO DIFF WBC: CPT

## 2024-06-25 PROCEDURE — 36415 COLL VENOUS BLD VENIPUNCTURE: CPT

## 2024-07-08 DIAGNOSIS — D50.8 IRON DEFICIENCY ANEMIA SECONDARY TO INADEQUATE DIETARY IRON INTAKE: ICD-10-CM

## 2024-07-08 DIAGNOSIS — D69.3 CHRONIC ITP (IDIOPATHIC THROMBOCYTOPENIC PURPURA) (HCC): Primary | ICD-10-CM

## 2024-07-08 NOTE — PROGRESS NOTES
succinate (TOPROL XL) 50 MG extended release tablet Take 1 tablet by mouth daily      hydroxychloroquine (PLAQUENIL) 200 MG tablet Take by mouth daily       No current facility-administered medications for this visit.        Allergies:   Allergies   Allergen Reactions    Ondansetron Hcl     Oxycodone-Aspirin Other (See Comments)       Social History:    Social History     Tobacco Use    Smoking status: Never    Smokeless tobacco: Never   Vaping Use    Vaping Use: Never used   Substance Use Topics    Alcohol use: Not Currently    Drug use: Never       Family History:   Family History   Problem Relation Age of Onset    Heart Disease Mother     Cancer Sister     Cancer Brother        Vitals:   Vitals:    07/15/24 1104   BP: 102/70   Site: Left Upper Arm   Position: Sitting   Pulse: 67   Temp: 97.6 °F (36.4 °C)   TempSrc: Temporal   SpO2: 97%   Weight: 42.6 kg (94 lb)        Subjective   REVIEW OF SYSTEMS:   Review of Systems   Constitutional:  Positive for fatigue. Negative for chills, diaphoresis and fever.   HENT: Negative.  Negative for congestion, ear pain, hearing loss, nosebleeds, sore throat and tinnitus.    Eyes: Negative.  Negative for pain, discharge and redness.   Respiratory: Negative.  Negative for cough, shortness of breath and wheezing.    Cardiovascular: Negative.  Negative for chest pain, palpitations and leg swelling.   Gastrointestinal: Negative.  Negative for abdominal pain, blood in stool, constipation, diarrhea, nausea and vomiting.   Endocrine: Negative for polydipsia.   Genitourinary:  Negative for dysuria, flank pain, frequency, hematuria and urgency.   Musculoskeletal: Negative.  Negative for back pain, myalgias and neck pain.   Skin: Negative.  Negative for rash.   Neurological: Negative.  Negative for dizziness, tremors, seizures, weakness and headaches.   Hematological:  Does not bruise/bleed easily.   Psychiatric/Behavioral: Negative.  The patient is not nervous/anxious.        Objective

## 2024-07-11 ENCOUNTER — TELEPHONE (OUTPATIENT)
Dept: HEMATOLOGY | Age: 79
End: 2024-07-11

## 2024-07-11 NOTE — TELEPHONE ENCOUNTER
Called and left detailed vm about appt. date and time on 07/15/24. I left message to come at appt. time & not any earlier & we would gladly take care of everything at that time of their follow up appt. time. Left number to call in case they can't make this appt. and needed to r/s.

## 2024-07-15 ENCOUNTER — HOSPITAL ENCOUNTER (OUTPATIENT)
Dept: INFUSION THERAPY | Age: 79
Discharge: HOME | End: 2024-07-15
Payer: MEDICARE

## 2024-07-15 ENCOUNTER — OFFICE VISIT (OUTPATIENT)
Dept: HEMATOLOGY | Age: 79
End: 2024-07-15
Payer: MEDICARE

## 2024-07-15 VITALS
WEIGHT: 94 LBS | HEART RATE: 67 BPM | TEMPERATURE: 97.6 F | DIASTOLIC BLOOD PRESSURE: 70 MMHG | SYSTOLIC BLOOD PRESSURE: 102 MMHG | OXYGEN SATURATION: 97 % | BODY MASS INDEX: 18.36 KG/M2

## 2024-07-15 DIAGNOSIS — D69.3 CHRONIC ITP (IDIOPATHIC THROMBOCYTOPENIC PURPURA) (HCC): ICD-10-CM

## 2024-07-15 DIAGNOSIS — D50.8 IRON DEFICIENCY ANEMIA SECONDARY TO INADEQUATE DIETARY IRON INTAKE: ICD-10-CM

## 2024-07-15 DIAGNOSIS — D69.3 CHRONIC ITP (IDIOPATHIC THROMBOCYTOPENIC PURPURA) (HCC): Primary | ICD-10-CM

## 2024-07-15 DIAGNOSIS — Z79.899 MEDICATION MANAGEMENT: ICD-10-CM

## 2024-07-15 LAB
ALBUMIN SERPL-MCNC: 4.6 G/DL (ref 3.5–5.2)
ALP SERPL-CCNC: 81 U/L (ref 35–104)
ALT SERPL-CCNC: 14 U/L (ref 5–33)
ANION GAP SERPL CALCULATED.3IONS-SCNC: 18 MMOL/L (ref 7–19)
AST SERPL-CCNC: 21 U/L (ref 5–32)
BASOPHILS # BLD: 0.03 K/UL (ref 0.01–0.08)
BASOPHILS NFR BLD: 0.8 % (ref 0.1–1.2)
BILIRUB SERPL-MCNC: 0.2 MG/DL (ref 0.2–1.2)
BUN SERPL-MCNC: 19 MG/DL (ref 8–23)
CALCIUM SERPL-MCNC: 9.5 MG/DL (ref 8.8–10.2)
CHLORIDE SERPL-SCNC: 95 MMOL/L (ref 98–111)
CO2 SERPL-SCNC: 19 MMOL/L (ref 22–29)
CREAT SERPL-MCNC: 0.9 MG/DL (ref 0.5–0.9)
EOSINOPHIL # BLD: 0.08 K/UL (ref 0.04–0.54)
EOSINOPHIL NFR BLD: 2.1 % (ref 0.7–7)
ERYTHROCYTE [DISTWIDTH] IN BLOOD BY AUTOMATED COUNT: 12.2 % (ref 11.7–14.4)
FERRITIN SERPL-MCNC: 343.1 NG/ML (ref 13–150)
GLUCOSE SERPL-MCNC: 98 MG/DL (ref 74–109)
HCT VFR BLD AUTO: 31.4 % (ref 34.1–44.9)
HGB BLD-MCNC: 10.7 G/DL (ref 11.2–15.7)
IRON SATN MFR SERPL: 26 % (ref 14–50)
IRON SERPL-MCNC: 78 UG/DL (ref 37–145)
LYMPHOCYTES # BLD: 0.83 K/UL (ref 1.18–3.74)
LYMPHOCYTES NFR BLD: 21.8 % (ref 19.3–53.1)
MCH RBC QN AUTO: 30.8 PG (ref 25.6–32.2)
MCHC RBC AUTO-ENTMCNC: 34.1 G/DL (ref 32.3–35.5)
MCV RBC AUTO: 90.5 FL (ref 79.4–94.8)
MONOCYTES # BLD: 0.45 K/UL (ref 0.24–0.82)
MONOCYTES NFR BLD: 11.8 % (ref 4.7–12.5)
NEUTROPHILS # BLD: 2.4 K/UL (ref 1.56–6.13)
NEUTS SEG NFR BLD: 63 % (ref 34–71.1)
PLATELET # BLD AUTO: 400 K/UL (ref 182–369)
PMV BLD AUTO: 8.4 FL (ref 7.4–10.4)
POTASSIUM SERPL-SCNC: 4.6 MMOL/L (ref 3.5–5)
PROT SERPL-MCNC: 7.1 G/DL (ref 6.6–8.7)
RBC # BLD AUTO: 3.47 M/UL (ref 3.93–5.22)
SODIUM SERPL-SCNC: 132 MMOL/L (ref 136–145)
TIBC SERPL-MCNC: 301 UG/DL (ref 250–400)
WBC # BLD AUTO: 3.81 K/UL (ref 3.98–10.04)

## 2024-07-15 PROCEDURE — G8419 CALC BMI OUT NRM PARAM NOF/U: HCPCS | Performed by: NURSE PRACTITIONER

## 2024-07-15 PROCEDURE — 99212 OFFICE O/P EST SF 10 MIN: CPT

## 2024-07-15 PROCEDURE — 1090F PRES/ABSN URINE INCON ASSESS: CPT | Performed by: NURSE PRACTITIONER

## 2024-07-15 PROCEDURE — 36415 COLL VENOUS BLD VENIPUNCTURE: CPT | Performed by: NURSE PRACTITIONER

## 2024-07-15 PROCEDURE — 1036F TOBACCO NON-USER: CPT | Performed by: NURSE PRACTITIONER

## 2024-07-15 PROCEDURE — G8427 DOCREV CUR MEDS BY ELIG CLIN: HCPCS | Performed by: NURSE PRACTITIONER

## 2024-07-15 PROCEDURE — 36415 COLL VENOUS BLD VENIPUNCTURE: CPT

## 2024-07-15 PROCEDURE — 85025 COMPLETE CBC W/AUTO DIFF WBC: CPT

## 2024-07-15 PROCEDURE — 99214 OFFICE O/P EST MOD 30 MIN: CPT | Performed by: NURSE PRACTITIONER

## 2024-07-15 PROCEDURE — G8400 PT W/DXA NO RESULTS DOC: HCPCS | Performed by: NURSE PRACTITIONER

## 2024-07-15 PROCEDURE — 1123F ACP DISCUSS/DSCN MKR DOCD: CPT | Performed by: NURSE PRACTITIONER

## 2024-07-15 ASSESSMENT — ENCOUNTER SYMPTOMS
SHORTNESS OF BREATH: 0
EYE REDNESS: 0
EYE PAIN: 0
NAUSEA: 0
EYES NEGATIVE: 1
VOMITING: 0
BLOOD IN STOOL: 0
DIARRHEA: 0
EYE DISCHARGE: 0
RESPIRATORY NEGATIVE: 1
BACK PAIN: 0
ABDOMINAL PAIN: 0
GASTROINTESTINAL NEGATIVE: 1
SORE THROAT: 0
COUGH: 0
WHEEZING: 0
CONSTIPATION: 0

## 2024-07-29 ENCOUNTER — HOSPITAL ENCOUNTER (OUTPATIENT)
Dept: INFUSION THERAPY | Age: 79
Discharge: HOME | End: 2024-07-29
Payer: MEDICARE

## 2024-07-29 DIAGNOSIS — D69.3 CHRONIC ITP (IDIOPATHIC THROMBOCYTOPENIC PURPURA) (HCC): ICD-10-CM

## 2024-07-29 LAB
BASOPHILS # BLD: 0.02 K/UL (ref 0.01–0.08)
BASOPHILS NFR BLD: 0.4 % (ref 0.1–1.2)
EOSINOPHIL # BLD: 0.11 K/UL (ref 0.04–0.54)
EOSINOPHIL NFR BLD: 2.4 % (ref 0.7–7)
ERYTHROCYTE [DISTWIDTH] IN BLOOD BY AUTOMATED COUNT: 12.4 % (ref 11.7–14.4)
HCT VFR BLD AUTO: 33.4 % (ref 34.1–44.9)
HGB BLD-MCNC: 11.1 G/DL (ref 11.2–15.7)
LYMPHOCYTES # BLD: 1.16 K/UL (ref 1.18–3.74)
LYMPHOCYTES NFR BLD: 24.9 % (ref 19.3–53.1)
MCH RBC QN AUTO: 30.7 PG (ref 25.6–32.2)
MCHC RBC AUTO-ENTMCNC: 33.2 G/DL (ref 32.3–35.5)
MCV RBC AUTO: 92.3 FL (ref 79.4–94.8)
MONOCYTES # BLD: 0.43 K/UL (ref 0.24–0.82)
MONOCYTES NFR BLD: 9.2 % (ref 4.7–12.5)
NEUTROPHILS # BLD: 2.93 K/UL (ref 1.56–6.13)
NEUTS SEG NFR BLD: 62.9 % (ref 34–71.1)
PLATELET # BLD AUTO: 238 K/UL (ref 182–369)
PMV BLD AUTO: 8.8 FL (ref 7.4–10.4)
RBC # BLD AUTO: 3.62 M/UL (ref 3.93–5.22)
WBC # BLD AUTO: 4.66 K/UL (ref 3.98–10.04)

## 2024-07-29 PROCEDURE — 36415 COLL VENOUS BLD VENIPUNCTURE: CPT

## 2024-07-29 PROCEDURE — 85025 COMPLETE CBC W/AUTO DIFF WBC: CPT

## 2024-08-01 ENCOUNTER — TELEPHONE (OUTPATIENT)
Dept: HEMATOLOGY | Age: 79
End: 2024-08-01

## 2024-08-01 NOTE — TELEPHONE ENCOUNTER
Called patient and reviewed lab results, platelet count 238,000.  Instructed that BETTINA Quinones would like for her to restart her Promacta at 12.5 mg daily and recheck her CBC in 2 weeks.  Appointment set up for 08/15 at 1300.  Patient v/u and is agreeable to plan.

## 2024-08-15 ENCOUNTER — OFFICE VISIT (OUTPATIENT)
Dept: OBGYN CLINIC | Age: 79
End: 2024-08-15
Payer: MEDICARE

## 2024-08-15 ENCOUNTER — HOSPITAL ENCOUNTER (OUTPATIENT)
Dept: INFUSION THERAPY | Age: 79
Discharge: HOME | End: 2024-08-15
Payer: MEDICARE

## 2024-08-15 VITALS
DIASTOLIC BLOOD PRESSURE: 77 MMHG | SYSTOLIC BLOOD PRESSURE: 168 MMHG | HEART RATE: 65 BPM | BODY MASS INDEX: 18.57 KG/M2 | HEIGHT: 60 IN | WEIGHT: 94.6 LBS

## 2024-08-15 DIAGNOSIS — N95.2 VAGINAL ATROPHY: ICD-10-CM

## 2024-08-15 DIAGNOSIS — N81.10 VAGINAL PROLAPSE: ICD-10-CM

## 2024-08-15 DIAGNOSIS — Z46.89 PESSARY MAINTENANCE: Primary | ICD-10-CM

## 2024-08-15 DIAGNOSIS — N81.11 CYSTOCELE, MIDLINE: ICD-10-CM

## 2024-08-15 DIAGNOSIS — D69.3 CHRONIC ITP (IDIOPATHIC THROMBOCYTOPENIC PURPURA) (HCC): ICD-10-CM

## 2024-08-15 LAB
BASOPHILS # BLD: 0.03 K/UL (ref 0.01–0.08)
BASOPHILS NFR BLD: 0.7 % (ref 0.1–1.2)
EOSINOPHIL # BLD: 0.12 K/UL (ref 0.04–0.54)
EOSINOPHIL NFR BLD: 2.8 % (ref 0.7–7)
ERYTHROCYTE [DISTWIDTH] IN BLOOD BY AUTOMATED COUNT: 12.4 % (ref 11.7–14.4)
HCT VFR BLD AUTO: 31.5 % (ref 34.1–44.9)
HGB BLD-MCNC: 10.5 G/DL (ref 11.2–15.7)
LYMPHOCYTES # BLD: 1.08 K/UL (ref 1.18–3.74)
LYMPHOCYTES NFR BLD: 25.4 % (ref 19.3–53.1)
MCH RBC QN AUTO: 30.9 PG (ref 25.6–32.2)
MCHC RBC AUTO-ENTMCNC: 33.3 G/DL (ref 32.3–35.5)
MCV RBC AUTO: 92.6 FL (ref 79.4–94.8)
MONOCYTES # BLD: 0.43 K/UL (ref 0.24–0.82)
MONOCYTES NFR BLD: 10.1 % (ref 4.7–12.5)
NEUTROPHILS # BLD: 2.58 K/UL (ref 1.56–6.13)
NEUTS SEG NFR BLD: 60.5 % (ref 34–71.1)
PLATELET # BLD AUTO: 339 K/UL (ref 182–369)
PMV BLD AUTO: 8.5 FL (ref 7.4–10.4)
RBC # BLD AUTO: 3.4 M/UL (ref 3.93–5.22)
WBC # BLD AUTO: 4.26 K/UL (ref 3.98–10.04)

## 2024-08-15 PROCEDURE — 99213 OFFICE O/P EST LOW 20 MIN: CPT | Performed by: NURSE PRACTITIONER

## 2024-08-15 PROCEDURE — G8427 DOCREV CUR MEDS BY ELIG CLIN: HCPCS | Performed by: NURSE PRACTITIONER

## 2024-08-15 PROCEDURE — G8400 PT W/DXA NO RESULTS DOC: HCPCS | Performed by: NURSE PRACTITIONER

## 2024-08-15 PROCEDURE — 85025 COMPLETE CBC W/AUTO DIFF WBC: CPT

## 2024-08-15 PROCEDURE — 1123F ACP DISCUSS/DSCN MKR DOCD: CPT | Performed by: NURSE PRACTITIONER

## 2024-08-15 PROCEDURE — 1036F TOBACCO NON-USER: CPT | Performed by: NURSE PRACTITIONER

## 2024-08-15 PROCEDURE — G8419 CALC BMI OUT NRM PARAM NOF/U: HCPCS | Performed by: NURSE PRACTITIONER

## 2024-08-15 PROCEDURE — 1090F PRES/ABSN URINE INCON ASSESS: CPT | Performed by: NURSE PRACTITIONER

## 2024-08-15 PROCEDURE — 36415 COLL VENOUS BLD VENIPUNCTURE: CPT

## 2024-08-15 RX ORDER — METRONIDAZOLE 7.5 MG/G
1 GEL VAGINAL DAILY
Qty: 1 EACH | Refills: 0 | Status: SHIPPED | OUTPATIENT
Start: 2024-08-15 | End: 2024-08-20

## 2024-08-15 ASSESSMENT — ENCOUNTER SYMPTOMS
EYES NEGATIVE: 1
CONSTIPATION: 0
DIARRHEA: 0
ALLERGIC/IMMUNOLOGIC NEGATIVE: 1
GASTROINTESTINAL NEGATIVE: 1
RESPIRATORY NEGATIVE: 1

## 2024-08-15 NOTE — PROGRESS NOTES
Val Haegr is a 79 y.o. female who presents today for her medical conditions/ complaints as noted below. Val Hager is c/o of Follow-up        HPI  Pt presents for 3 month pessary maintenance. Has been doing well, but noticed some pink discharge on days that she has been up and moving around more. Denies any pain or bleeding.     No LMP recorded. Patient has had a hysterectomy.  No obstetric history on file.    Past Medical History:   Diagnosis Date    Arrhythmia     CAD (coronary artery disease)     Hyperlipidemia     Hypertension     Immune thrombocytopenic purpura (HCC) 7/26/2019    Sleep apnea     Systemic lupus erythematosus (HCC) 7/26/2019    Thrombocytopenia, unspecified (HCC) 7/26/2019     Past Surgical History:   Procedure Laterality Date    APPENDECTOMY      BREAST SURGERY      FOOT SURGERY      HYSTERECTOMY (CERVIX STATUS UNKNOWN)      TUBAL LIGATION       Family History   Problem Relation Age of Onset    Heart Disease Mother     Cancer Sister     Cancer Brother      Social History     Tobacco Use    Smoking status: Never    Smokeless tobacco: Never   Substance Use Topics    Alcohol use: Not Currently       Current Outpatient Medications   Medication Sig Dispense Refill    metroNIDAZOLE (METROGEL) 0.75 % vaginal gel Place 1 Applicatorful vaginally daily for 5 days 1 each 0    vitamin D (CHOLECALCIFEROL) 125 MCG (5000 UT) CAPS capsule Take 1 capsule by mouth daily      eltrombopag (PROMACTA) 12.5 MG TABS tablet Take 1 tablet by mouth daily 30 tablet 11    FEROSUL 325 (65 Fe) MG tablet TAKE 1 TABLET BY MOUTH 2 TIMES DAILY 60 tablet 5    lisinopril (PRINIVIL;ZESTRIL) 40 MG tablet Take 1 tablet by mouth daily      amLODIPine (NORVASC) 2.5 MG tablet Take 2 tablets by mouth daily      Potassium (POTASSIMIN PO) Take by mouth daily      metoprolol succinate (TOPROL XL) 50 MG extended release tablet Take 1 tablet by mouth daily      hydroxychloroquine (PLAQUENIL) 200 MG tablet Take by mouth daily

## 2024-08-15 NOTE — PROGRESS NOTES
Pt presents today for 3 month pessary maintenance. Pt denies any complaints or concerns. Pt does state she thought she might have seen a tiny amount of pink blood last week but no pain or anything. Pt states she has seen it a couple of times at the end of the day when she has been up and moving around.

## 2024-08-30 ENCOUNTER — TELEPHONE (OUTPATIENT)
Dept: HEMATOLOGY | Age: 79
End: 2024-08-30

## 2024-08-30 NOTE — TELEPHONE ENCOUNTER
Patient called and wanted to go over lab results.  Reviewed CBC results with patient, platelet count stable at 339,000.  Instructed that BETTINA Quinones wants her to continue taking Hydrea 12.5 mg daily and to get a CBC repeated in a couple of weeks.  Appointment set up for 09/09 at 1000.  Instructed to call with any problems.  Patient v/u and is agreeable to plan.

## 2024-09-09 ENCOUNTER — HOSPITAL ENCOUNTER (OUTPATIENT)
Dept: INFUSION THERAPY | Age: 79
Discharge: HOME | End: 2024-09-09
Payer: MEDICARE

## 2024-09-09 DIAGNOSIS — D69.3 CHRONIC ITP (IDIOPATHIC THROMBOCYTOPENIC PURPURA) (HCC): ICD-10-CM

## 2024-09-09 LAB
BASOPHILS # BLD: 0.02 K/UL (ref 0.01–0.08)
BASOPHILS NFR BLD: 0.5 % (ref 0.1–1.2)
EOSINOPHIL # BLD: 0.18 K/UL (ref 0.04–0.54)
EOSINOPHIL NFR BLD: 4.6 % (ref 0.7–7)
ERYTHROCYTE [DISTWIDTH] IN BLOOD BY AUTOMATED COUNT: 11.9 % (ref 11.7–14.4)
HCT VFR BLD AUTO: 33.1 % (ref 34.1–44.9)
HGB BLD-MCNC: 11.1 G/DL (ref 11.2–15.7)
LYMPHOCYTES # BLD: 1.09 K/UL (ref 1.18–3.74)
LYMPHOCYTES NFR BLD: 28.1 % (ref 19.3–53.1)
MCH RBC QN AUTO: 30.5 PG (ref 25.6–32.2)
MCHC RBC AUTO-ENTMCNC: 33.5 G/DL (ref 32.3–35.5)
MCV RBC AUTO: 90.9 FL (ref 79.4–94.8)
MONOCYTES # BLD: 0.31 K/UL (ref 0.24–0.82)
MONOCYTES NFR BLD: 8 % (ref 4.7–12.5)
NEUTROPHILS # BLD: 2.27 K/UL (ref 1.56–6.13)
NEUTS SEG NFR BLD: 58.5 % (ref 34–71.1)
PLATELET # BLD AUTO: 415 K/UL (ref 182–369)
PMV BLD AUTO: 8.6 FL (ref 7.4–10.4)
RBC # BLD AUTO: 3.64 M/UL (ref 3.93–5.22)
WBC # BLD AUTO: 3.88 K/UL (ref 3.98–10.04)

## 2024-09-09 PROCEDURE — 85025 COMPLETE CBC W/AUTO DIFF WBC: CPT

## 2024-09-09 PROCEDURE — 36415 COLL VENOUS BLD VENIPUNCTURE: CPT

## 2024-09-10 ENCOUNTER — TELEPHONE (OUTPATIENT)
Dept: HEMATOLOGY | Age: 79
End: 2024-09-10

## 2024-09-27 ENCOUNTER — TELEPHONE (OUTPATIENT)
Dept: CARDIOLOGY | Facility: CLINIC | Age: 79
End: 2024-09-27
Payer: MEDICARE

## 2024-10-01 ENCOUNTER — HOSPITAL ENCOUNTER (OUTPATIENT)
Dept: INFUSION THERAPY | Age: 79
Discharge: HOME | End: 2024-10-01
Payer: MEDICARE

## 2024-10-01 DIAGNOSIS — D69.3 CHRONIC ITP (IDIOPATHIC THROMBOCYTOPENIC PURPURA) (HCC): ICD-10-CM

## 2024-10-01 LAB
BASOPHILS # BLD: 0.03 K/UL (ref 0.01–0.08)
BASOPHILS NFR BLD: 0.7 % (ref 0.1–1.2)
EOSINOPHIL # BLD: 0.12 K/UL (ref 0.04–0.54)
EOSINOPHIL NFR BLD: 2.7 % (ref 0.7–7)
ERYTHROCYTE [DISTWIDTH] IN BLOOD BY AUTOMATED COUNT: 11.9 % (ref 11.7–14.4)
HCT VFR BLD AUTO: 32.6 % (ref 34.1–44.9)
HGB BLD-MCNC: 11.1 G/DL (ref 11.2–15.7)
LYMPHOCYTES # BLD: 1.14 K/UL (ref 1.18–3.74)
LYMPHOCYTES NFR BLD: 26 % (ref 19.3–53.1)
MCH RBC QN AUTO: 30.9 PG (ref 25.6–32.2)
MCHC RBC AUTO-ENTMCNC: 34 G/DL (ref 32.3–35.5)
MCV RBC AUTO: 90.8 FL (ref 79.4–94.8)
MONOCYTES # BLD: 0.34 K/UL (ref 0.24–0.82)
MONOCYTES NFR BLD: 7.7 % (ref 4.7–12.5)
NEUTROPHILS # BLD: 2.75 K/UL (ref 1.56–6.13)
NEUTS SEG NFR BLD: 62.7 % (ref 34–71.1)
PLATELET # BLD AUTO: 263 K/UL (ref 182–369)
PMV BLD AUTO: 8.8 FL (ref 7.4–10.4)
RBC # BLD AUTO: 3.59 M/UL (ref 3.93–5.22)
WBC # BLD AUTO: 4.39 K/UL (ref 3.98–10.04)

## 2024-10-01 PROCEDURE — 36415 COLL VENOUS BLD VENIPUNCTURE: CPT

## 2024-10-01 PROCEDURE — 85025 COMPLETE CBC W/AUTO DIFF WBC: CPT

## 2024-10-24 ENCOUNTER — TELEPHONE (OUTPATIENT)
Dept: HEMATOLOGY | Age: 79
End: 2024-10-24

## 2024-10-24 NOTE — TELEPHONE ENCOUNTER
Called Patient and reminded patient of their appointment on 10/28/2024 and patient confirmed they would be here. Reminded patient to just come at appointment time, and to not come at the lab appointment time. Reminded patient that we will not check them in any more than 30 minutes before appointment time.  We have now moved to the [facility] cancer center that is located between our old office and the ER at the [facility]. Letting the Pt know that our front entrance faces the  [employer] [place].

## 2024-10-27 DIAGNOSIS — D69.3 CHRONIC ITP (IDIOPATHIC THROMBOCYTOPENIC PURPURA) (HCC): ICD-10-CM

## 2024-10-27 DIAGNOSIS — D50.8 IRON DEFICIENCY ANEMIA SECONDARY TO INADEQUATE DIETARY IRON INTAKE: Primary | ICD-10-CM

## 2024-10-28 ENCOUNTER — HOSPITAL ENCOUNTER (OUTPATIENT)
Dept: INFUSION THERAPY | Age: 79
Discharge: HOME | End: 2024-10-28
Payer: MEDICARE

## 2024-10-28 ENCOUNTER — OFFICE VISIT (OUTPATIENT)
Dept: HEMATOLOGY | Age: 79
End: 2024-10-28
Payer: MEDICARE

## 2024-10-28 VITALS
HEART RATE: 68 BPM | WEIGHT: 94.3 LBS | SYSTOLIC BLOOD PRESSURE: 128 MMHG | DIASTOLIC BLOOD PRESSURE: 72 MMHG | BODY MASS INDEX: 18.51 KG/M2 | TEMPERATURE: 97.9 F | OXYGEN SATURATION: 98 % | HEIGHT: 60 IN

## 2024-10-28 DIAGNOSIS — D69.3 CHRONIC ITP (IDIOPATHIC THROMBOCYTOPENIC PURPURA) (HCC): ICD-10-CM

## 2024-10-28 DIAGNOSIS — D50.8 IRON DEFICIENCY ANEMIA SECONDARY TO INADEQUATE DIETARY IRON INTAKE: ICD-10-CM

## 2024-10-28 DIAGNOSIS — Z79.899 MEDICATION MANAGEMENT: ICD-10-CM

## 2024-10-28 DIAGNOSIS — D69.3 CHRONIC ITP (IDIOPATHIC THROMBOCYTOPENIC PURPURA) (HCC): Primary | ICD-10-CM

## 2024-10-28 LAB
ALBUMIN SERPL-MCNC: 4.6 G/DL (ref 3.5–5.2)
ALP SERPL-CCNC: 72 U/L (ref 35–104)
ALT SERPL-CCNC: 14 U/L (ref 5–33)
ANION GAP SERPL CALCULATED.3IONS-SCNC: 12 MMOL/L (ref 7–19)
AST SERPL-CCNC: 23 U/L (ref 5–32)
BASOPHILS # BLD: 0.03 K/UL (ref 0.01–0.08)
BASOPHILS NFR BLD: 0.8 % (ref 0.1–1.2)
BILIRUB SERPL-MCNC: <0.2 MG/DL (ref 0–1.2)
BUN SERPL-MCNC: 23 MG/DL (ref 8–23)
CALCIUM SERPL-MCNC: 9.3 MG/DL (ref 8.8–10.2)
CHLORIDE SERPL-SCNC: 100 MMOL/L (ref 98–107)
CO2 SERPL-SCNC: 23 MMOL/L (ref 22–29)
CREAT SERPL-MCNC: 1 MG/DL (ref 0.5–0.9)
EOSINOPHIL # BLD: 0.08 K/UL (ref 0.04–0.54)
EOSINOPHIL NFR BLD: 2.2 % (ref 0.7–7)
ERYTHROCYTE [DISTWIDTH] IN BLOOD BY AUTOMATED COUNT: 12.1 % (ref 11.7–14.4)
FERRITIN SERPL-MCNC: 315.5 NG/ML (ref 13–150)
GLUCOSE SERPL-MCNC: 97 MG/DL (ref 70–99)
HCT VFR BLD AUTO: 31.5 % (ref 34.1–44.9)
HGB BLD-MCNC: 10.5 G/DL (ref 11.2–15.7)
IRON SATN MFR SERPL: 24 % (ref 14–50)
IRON SERPL-MCNC: 66 UG/DL (ref 37–145)
LYMPHOCYTES # BLD: 0.83 K/UL (ref 1.18–3.74)
LYMPHOCYTES NFR BLD: 22.8 % (ref 19.3–53.1)
MCH RBC QN AUTO: 30.4 PG (ref 25.6–32.2)
MCHC RBC AUTO-ENTMCNC: 33.3 G/DL (ref 32.3–35.5)
MCV RBC AUTO: 91.3 FL (ref 79.4–94.8)
MONOCYTES # BLD: 0.33 K/UL (ref 0.24–0.82)
MONOCYTES NFR BLD: 9.1 % (ref 4.7–12.5)
NEUTROPHILS # BLD: 2.36 K/UL (ref 1.56–6.13)
NEUTS SEG NFR BLD: 64.8 % (ref 34–71.1)
PLATELET # BLD AUTO: 251 K/UL (ref 182–369)
PMV BLD AUTO: 8.5 FL (ref 7.4–10.4)
POTASSIUM SERPL-SCNC: 4.1 MMOL/L (ref 3.5–5.1)
PROT SERPL-MCNC: 6.8 G/DL (ref 6.4–8.3)
RBC # BLD AUTO: 3.45 M/UL (ref 3.93–5.22)
SODIUM SERPL-SCNC: 135 MMOL/L (ref 136–145)
TIBC SERPL-MCNC: 275 UG/DL (ref 250–400)
WBC # BLD AUTO: 3.64 K/UL (ref 3.98–10.04)

## 2024-10-28 PROCEDURE — 99214 OFFICE O/P EST MOD 30 MIN: CPT | Performed by: NURSE PRACTITIONER

## 2024-10-28 PROCEDURE — G8400 PT W/DXA NO RESULTS DOC: HCPCS | Performed by: NURSE PRACTITIONER

## 2024-10-28 PROCEDURE — 36415 COLL VENOUS BLD VENIPUNCTURE: CPT

## 2024-10-28 PROCEDURE — G8484 FLU IMMUNIZE NO ADMIN: HCPCS | Performed by: NURSE PRACTITIONER

## 2024-10-28 PROCEDURE — 85025 COMPLETE CBC W/AUTO DIFF WBC: CPT

## 2024-10-28 PROCEDURE — G8419 CALC BMI OUT NRM PARAM NOF/U: HCPCS | Performed by: NURSE PRACTITIONER

## 2024-10-28 PROCEDURE — 1126F AMNT PAIN NOTED NONE PRSNT: CPT | Performed by: NURSE PRACTITIONER

## 2024-10-28 PROCEDURE — G2211 COMPLEX E/M VISIT ADD ON: HCPCS | Performed by: NURSE PRACTITIONER

## 2024-10-28 PROCEDURE — 1036F TOBACCO NON-USER: CPT | Performed by: NURSE PRACTITIONER

## 2024-10-28 PROCEDURE — 99212 OFFICE O/P EST SF 10 MIN: CPT

## 2024-10-28 PROCEDURE — 1090F PRES/ABSN URINE INCON ASSESS: CPT | Performed by: NURSE PRACTITIONER

## 2024-10-28 PROCEDURE — 1125F AMNT PAIN NOTED PAIN PRSNT: CPT | Performed by: NURSE PRACTITIONER

## 2024-10-28 PROCEDURE — 80053 COMPREHEN METABOLIC PANEL: CPT

## 2024-10-28 PROCEDURE — 1159F MED LIST DOCD IN RCRD: CPT | Performed by: NURSE PRACTITIONER

## 2024-10-28 PROCEDURE — G8427 DOCREV CUR MEDS BY ELIG CLIN: HCPCS | Performed by: NURSE PRACTITIONER

## 2024-10-28 PROCEDURE — 1123F ACP DISCUSS/DSCN MKR DOCD: CPT | Performed by: NURSE PRACTITIONER

## 2024-10-28 RX ORDER — ESTRADIOL 0.1 MG/G
CREAM VAGINAL PRN
COMMUNITY

## 2024-10-28 ASSESSMENT — ENCOUNTER SYMPTOMS
COUGH: 0
BACK PAIN: 0
EYES NEGATIVE: 1
EYE REDNESS: 0
GASTROINTESTINAL NEGATIVE: 1
SHORTNESS OF BREATH: 0
ABDOMINAL PAIN: 0
WHEEZING: 0
SORE THROAT: 0
NAUSEA: 0
EYE DISCHARGE: 0
EYE PAIN: 0
VOMITING: 0
DIARRHEA: 0
BLOOD IN STOOL: 0
CONSTIPATION: 0
RESPIRATORY NEGATIVE: 1

## 2024-10-28 NOTE — PROGRESS NOTES
Progress Note      Pt Name: Val Hager  YOB: 1945  MRN: [id number]    Date of evaluation: 10/28/2024  History Obtained From:  patient, electronic medical record    CHIEF COMPLAINT:    Chief Complaint   Patient presents with    Follow-up     Chronic ITP (idiopathic thrombocytopenic purpura)     HISTORY OF PRESENT ILLNESS:    Val Hager is a 79 y.o.  female who is followed for ITP and iron deficiency anemia.  Current recommendation is to continue with Promacta 12.5 mg p.o. every other day and ferrous sulfate 325 mg once daily.  [place] has continued to have an excellent response to Promacta, platelet count is 251,000 today.  [place] returns today in scheduled follow-up for evaluation, side effect monitoring, lab monitoring and further recommendations.  She has no new complaints other than her chronic knee pain, indicates that she is anticipating right knee replacement by Dr. Caballero and is scheduled 3/19/2024.    Today's clinic visit to include physical assessment, review of systems, any lab or radiographic findings that were available and plan of care are documented below.    Up-to-Date  Promacta: Information obtained from     HEMATOLOGY HISTORY:   Diagnosis:   Thrombocytopenia with differential diagnosis of ITP    Sjogrens  Iron deficiency anemia    Treatment summary:  Initiation of prednisone at 50 mg daily on 4/27/2017 and completed on 6/22/2017 after weekly dose titration of 10 mg weekly.   Prednisone 15 mg daily ×14 days, completed on 11/18/2017 for planned colonoscopy for positive occult stools.   5/2/2018 -initiation of Promacta 50 mg by mouth daily. Dose increased to 75 mg daily on 10/3/2018.   6/21/2019- decreased Promacta dosing to 50 mg   11/25/2019-decrease Promacta to 25 mg daily  1/21/2020-decrease Promacta 12.5 mg daily  7/15/2020-ferrous sulfate 325 mg 1 tablet p.o. x7 days then increase to twice daily if tolerated, intolerant to twice daily due to

## 2024-11-07 DIAGNOSIS — D50.8 IRON DEFICIENCY ANEMIA SECONDARY TO INADEQUATE DIETARY IRON INTAKE: ICD-10-CM

## 2024-11-07 RX ORDER — FERROUS SULFATE 325(65) MG
1 TABLET ORAL 2 TIMES DAILY
Qty: 60 TABLET | Refills: 5 | Status: SHIPPED | OUTPATIENT
Start: 2024-11-07

## 2024-11-12 ENCOUNTER — TELEPHONE (OUTPATIENT)
Age: 79
End: 2024-11-12

## 2024-11-15 ENCOUNTER — OFFICE VISIT (OUTPATIENT)
Dept: OBGYN CLINIC | Age: 79
End: 2024-11-15

## 2024-11-15 VITALS
WEIGHT: 95 LBS | DIASTOLIC BLOOD PRESSURE: 76 MMHG | HEART RATE: 67 BPM | SYSTOLIC BLOOD PRESSURE: 169 MMHG | BODY MASS INDEX: 18.55 KG/M2

## 2024-11-15 DIAGNOSIS — N95.2 VAGINAL ATROPHY: ICD-10-CM

## 2024-11-15 DIAGNOSIS — N81.10 VAGINAL PROLAPSE: ICD-10-CM

## 2024-11-15 DIAGNOSIS — N81.11 CYSTOCELE, MIDLINE: ICD-10-CM

## 2024-11-15 DIAGNOSIS — Z46.89 PESSARY MAINTENANCE: Primary | ICD-10-CM

## 2024-11-15 ASSESSMENT — ENCOUNTER SYMPTOMS
CONSTIPATION: 0
GASTROINTESTINAL NEGATIVE: 1
ALLERGIC/IMMUNOLOGIC NEGATIVE: 1
RESPIRATORY NEGATIVE: 1
EYES NEGATIVE: 1
DIARRHEA: 0

## 2024-11-15 NOTE — PROGRESS NOTES
Val Hager is a 79 y.o. female who presents today for her medical conditions/ complaints as noted below. Val Hager is c/o of Follow-up (Pessary )        HPI  Pt presents for 3 month pessary maintenance. Has been working well for prolapse.     No LMP recorded. Patient has had a hysterectomy.  No obstetric history on file.    Past Medical History:   Diagnosis Date    Arrhythmia     CAD (coronary artery disease)     Hyperlipidemia     Hypertension     Immune thrombocytopenic purpura (HCC) 7/26/2019    Sleep apnea     Systemic lupus erythematosus (HCC) 7/26/2019    Thrombocytopenia, unspecified (HCC) 7/26/2019     Past Surgical History:   Procedure Laterality Date    APPENDECTOMY      BREAST SURGERY      FOOT SURGERY      HYSTERECTOMY (CERVIX STATUS UNKNOWN)      TUBAL LIGATION       Family History   Problem Relation Age of Onset    Heart Disease Mother     Cancer Sister     Cancer Brother     Unknown Maternal Grandmother     Unknown Maternal Grandfather     Unknown Paternal Grandmother     Unknown Paternal Grandfather      Social History     Tobacco Use    Smoking status: Never    Smokeless tobacco: Never   Substance Use Topics    Alcohol use: Not Currently       Current Outpatient Medications   Medication Sig Dispense Refill    ferrous sulfate (FEROSUL) 325 (65 Fe) MG tablet Take 1 tablet by mouth 2 times daily 60 tablet 5    estradiol (ESTRACE) 0.1 MG/GM vaginal cream Place vaginally as needed      vitamin D (CHOLECALCIFEROL) 125 MCG (5000 UT) CAPS capsule Take 1 capsule by mouth daily      eltrombopag (PROMACTA) 12.5 MG TABS tablet Take 1 tablet by mouth daily 30 tablet 11    lisinopril (PRINIVIL;ZESTRIL) 40 MG tablet Take 1 tablet by mouth daily      amLODIPine (NORVASC) 2.5 MG tablet Take 2 tablets by mouth daily      Potassium (POTASSIMIN PO) Take by mouth daily      metoprolol succinate (TOPROL XL) 50 MG extended release tablet Take 1 tablet by mouth daily      hydroxychloroquine (PLAQUENIL)

## 2024-11-15 NOTE — PATIENT INSTRUCTIONS
smoke. Smoking can cause a cough, which makes a prolapse worse. If you need help quitting, talk to your doctor about stop-smoking programs and medicines. These can increase your chances of quitting for good.  To help support your pelvic organs  Avoid activities that put pressure on your pelvic muscles, such as heavy lifting.  Try pelvic floor (Kegel) exercises, which tighten and strengthen pelvic muscles. (If doing these exercises causes pain, stop doing them and talk with your doctor.) To do Kegel exercises:  Squeeze your muscles as if you were trying not to pass gas. Or squeeze your muscles as if you were stopping the flow of urine. Your belly, legs, and buttocks shouldn't move.  Hold the squeeze for 3 seconds, then relax for 5 to 10 seconds.  Start with 3 seconds, then add 1 second each week until you are able to squeeze for 10 seconds.  Repeat the exercise 10 times a session. Do 3 to 8 sessions a day.  To ease pressure on your vagina, lie down and put a pillow under your knees. You also can lie on your side and bring your knees up to your chest.  When should you call for help?   Call your doctor now or seek immediate medical care if:    You have vaginal discharge that has increased in amount or smells bad.     You have new or worse belly or pelvic pain.   Watch closely for changes in your health, and be sure to contact your doctor if:    You have problems with the pessary, such as vaginal pain, vaginal bleeding, or problems with urination or bowel movements.   Where can you learn more?  Go to https://www.[employer].net/patientEd and enter X480 to learn more about \"Pessary: Care Instructions.\"  Current as of: November 27, 2023  Content Version: 14.2  © 2024 [employer].   Care instructions adapted under license by [employer]. If you have questions about a medical condition or this instruction, always ask your healthcare professional. [employer] disclaims any warranty or liability for

## 2024-11-25 ENCOUNTER — HOSPITAL ENCOUNTER (OUTPATIENT)
Dept: INFUSION THERAPY | Age: 79
Discharge: HOME | End: 2024-11-25
Payer: MEDICARE

## 2024-11-25 DIAGNOSIS — D69.3 CHRONIC ITP (IDIOPATHIC THROMBOCYTOPENIC PURPURA) (HCC): ICD-10-CM

## 2024-11-25 LAB
BASOPHILS # BLD: 0.03 K/UL (ref 0.01–0.08)
BASOPHILS NFR BLD: 0.8 % (ref 0.1–1.2)
EOSINOPHIL # BLD: 0.11 K/UL (ref 0.04–0.54)
EOSINOPHIL NFR BLD: 3 % (ref 0.7–7)
ERYTHROCYTE [DISTWIDTH] IN BLOOD BY AUTOMATED COUNT: 12.6 % (ref 11.7–14.4)
HCT VFR BLD AUTO: 30.7 % (ref 34.1–44.9)
HGB BLD-MCNC: 10.7 G/DL (ref 11.2–15.7)
LYMPHOCYTES # BLD: 1.18 K/UL (ref 1.18–3.74)
LYMPHOCYTES NFR BLD: 32.4 % (ref 19.3–53.1)
MCH RBC QN AUTO: 31.2 PG (ref 25.6–32.2)
MCHC RBC AUTO-ENTMCNC: 34.9 G/DL (ref 32.3–35.5)
MCV RBC AUTO: 89.5 FL (ref 79.4–94.8)
MONOCYTES # BLD: 0.41 K/UL (ref 0.24–0.82)
MONOCYTES NFR BLD: 11.3 % (ref 4.7–12.5)
NEUTROPHILS # BLD: 1.9 K/UL (ref 1.56–6.13)
NEUTS SEG NFR BLD: 52.2 % (ref 34–71.1)
PLATELET # BLD AUTO: 230 K/UL (ref 182–369)
PMV BLD AUTO: 9 FL (ref 7.4–10.4)
RBC # BLD AUTO: 3.43 M/UL (ref 3.93–5.22)
WBC # BLD AUTO: 3.64 K/UL (ref 3.98–10.04)

## 2024-11-25 PROCEDURE — 85025 COMPLETE CBC W/AUTO DIFF WBC: CPT

## 2024-11-25 PROCEDURE — 36415 COLL VENOUS BLD VENIPUNCTURE: CPT

## 2024-12-12 ENCOUNTER — CLINICAL DOCUMENTATION (OUTPATIENT)
Dept: INFUSION THERAPY | Age: 79
End: 2024-12-12

## 2024-12-12 NOTE — PROGRESS NOTES
Called and left message for [place] stating her Free Drug Application was approved for 2025 (promacta).    Jodi Townsend   Financial Navigator  [phone]

## 2024-12-23 ENCOUNTER — HOSPITAL ENCOUNTER (OUTPATIENT)
Dept: INFUSION THERAPY | Age: 79
Discharge: HOME | End: 2024-12-23
Payer: MEDICARE

## 2024-12-23 DIAGNOSIS — D69.3 CHRONIC ITP (IDIOPATHIC THROMBOCYTOPENIC PURPURA) (HCC): ICD-10-CM

## 2024-12-23 LAB
BASOPHILS # BLD: 0.03 K/UL (ref 0.01–0.08)
BASOPHILS NFR BLD: 0.8 % (ref 0.1–1.2)
EOSINOPHIL # BLD: 0.08 K/UL (ref 0.04–0.54)
EOSINOPHIL NFR BLD: 2.2 % (ref 0.7–7)
ERYTHROCYTE [DISTWIDTH] IN BLOOD BY AUTOMATED COUNT: 12.6 % (ref 11.7–14.4)
HCT VFR BLD AUTO: 33.1 % (ref 34.1–44.9)
HGB BLD-MCNC: 11.1 G/DL (ref 11.2–15.7)
LYMPHOCYTES # BLD: 1.04 K/UL (ref 1.18–3.74)
LYMPHOCYTES NFR BLD: 28.9 % (ref 19.3–53.1)
MCH RBC QN AUTO: 30.5 PG (ref 25.6–32.2)
MCHC RBC AUTO-ENTMCNC: 33.5 G/DL (ref 32.3–35.5)
MCV RBC AUTO: 90.9 FL (ref 79.4–94.8)
MONOCYTES # BLD: 0.44 K/UL (ref 0.24–0.82)
MONOCYTES NFR BLD: 12.2 % (ref 4.7–12.5)
NEUTROPHILS # BLD: 2 K/UL (ref 1.56–6.13)
NEUTS SEG NFR BLD: 55.6 % (ref 34–71.1)
PLATELET # BLD AUTO: 270 K/UL (ref 182–369)
PMV BLD AUTO: 8.7 FL (ref 7.4–10.4)
RBC # BLD AUTO: 3.64 M/UL (ref 3.93–5.22)
WBC # BLD AUTO: 3.6 K/UL (ref 3.98–10.04)

## 2024-12-23 PROCEDURE — 85025 COMPLETE CBC W/AUTO DIFF WBC: CPT

## 2024-12-23 PROCEDURE — 36415 COLL VENOUS BLD VENIPUNCTURE: CPT

## 2025-01-24 ENCOUNTER — TELEPHONE (OUTPATIENT)
Dept: HEMATOLOGY | Age: 80
End: 2025-01-24

## 2025-01-24 NOTE — TELEPHONE ENCOUNTER

## 2025-01-27 DIAGNOSIS — D69.3 CHRONIC ITP (IDIOPATHIC THROMBOCYTOPENIC PURPURA) (HCC): Primary | ICD-10-CM

## 2025-01-27 DIAGNOSIS — D50.8 IRON DEFICIENCY ANEMIA SECONDARY TO INADEQUATE DIETARY IRON INTAKE: ICD-10-CM

## 2025-01-28 ENCOUNTER — OFFICE VISIT (OUTPATIENT)
Dept: HEMATOLOGY | Age: 80
End: 2025-01-28
Payer: MEDICARE

## 2025-01-28 ENCOUNTER — HOSPITAL ENCOUNTER (OUTPATIENT)
Dept: INFUSION THERAPY | Age: 80
Discharge: HOME | End: 2025-01-28
Payer: MEDICARE

## 2025-01-28 VITALS
BODY MASS INDEX: 18.98 KG/M2 | SYSTOLIC BLOOD PRESSURE: 124 MMHG | DIASTOLIC BLOOD PRESSURE: 76 MMHG | TEMPERATURE: 97.9 F | HEART RATE: 66 BPM | WEIGHT: 96.7 LBS | HEIGHT: 60 IN | OXYGEN SATURATION: 98 %

## 2025-01-28 DIAGNOSIS — D69.3 CHRONIC ITP (IDIOPATHIC THROMBOCYTOPENIC PURPURA) (HCC): ICD-10-CM

## 2025-01-28 DIAGNOSIS — D50.8 IRON DEFICIENCY ANEMIA SECONDARY TO INADEQUATE DIETARY IRON INTAKE: ICD-10-CM

## 2025-01-28 DIAGNOSIS — Z79.899 MEDICATION MANAGEMENT: ICD-10-CM

## 2025-01-28 DIAGNOSIS — D69.3 CHRONIC ITP (IDIOPATHIC THROMBOCYTOPENIC PURPURA) (HCC): Primary | ICD-10-CM

## 2025-01-28 LAB
ALBUMIN SERPL-MCNC: 4.5 G/DL (ref 3.5–5.2)
ALP SERPL-CCNC: 70 U/L (ref 35–104)
ALT SERPL-CCNC: 12 U/L (ref 5–33)
ANION GAP SERPL CALCULATED.3IONS-SCNC: 11 MMOL/L (ref 7–19)
AST SERPL-CCNC: 23 U/L (ref 5–32)
BASOPHILS # BLD: 0.03 K/UL (ref 0–0.2)
BASOPHILS NFR BLD: 0.7 % (ref 0–1)
BILIRUB SERPL-MCNC: <0.2 MG/DL (ref 0–1.2)
BUN SERPL-MCNC: 18 MG/DL (ref 8–23)
CALCIUM SERPL-MCNC: 9.3 MG/DL (ref 8.8–10.2)
CHLORIDE SERPL-SCNC: 99 MMOL/L (ref 98–107)
CO2 SERPL-SCNC: 25 MMOL/L (ref 22–29)
CREAT SERPL-MCNC: 0.9 MG/DL (ref 0.5–0.9)
EOSINOPHIL # BLD: 0.11 K/UL (ref 0–0.6)
EOSINOPHIL NFR BLD: 2.7 % (ref 0–5)
ERYTHROCYTE [DISTWIDTH] IN BLOOD BY AUTOMATED COUNT: 12.8 % (ref 11.5–14.5)
FERRITIN SERPL-MCNC: 295.7 NG/ML (ref 13–150)
GLUCOSE SERPL-MCNC: 99 MG/DL (ref 70–99)
HCT VFR BLD AUTO: 31.7 % (ref 37–47)
HGB BLD-MCNC: 10.8 G/DL (ref 12–16)
IRON SATN MFR SERPL: 25 % (ref 15–50)
IRON SERPL-MCNC: 71 UG/DL (ref 37–145)
LYMPHOCYTES # BLD: 1.18 K/UL (ref 1.1–4.5)
LYMPHOCYTES NFR BLD: 28.9 % (ref 20–40)
MCH RBC QN AUTO: 31 PG (ref 27–31)
MCHC RBC AUTO-ENTMCNC: 34.1 G/DL (ref 33–37)
MCV RBC AUTO: 91.1 FL (ref 81–99)
MONOCYTES # BLD: 0.35 K/UL (ref 0–0.9)
MONOCYTES NFR BLD: 8.6 % (ref 1–10)
NEUTROPHILS # BLD: 2.4 K/UL (ref 1.5–7.5)
NEUTS SEG NFR BLD: 58.9 % (ref 50–65)
PLATELET # BLD AUTO: 244 K/UL (ref 130–400)
PMV BLD AUTO: 8.6 FL (ref 9.4–12.3)
POTASSIUM SERPL-SCNC: 4.4 MMOL/L (ref 3.5–5.1)
PROT SERPL-MCNC: 6.7 G/DL (ref 6.4–8.3)
RBC # BLD AUTO: 3.48 M/UL (ref 4.2–5.4)
SODIUM SERPL-SCNC: 135 MMOL/L (ref 136–145)
TIBC SERPL-MCNC: 282 UG/DL (ref 250–400)
WBC # BLD AUTO: 4.08 K/UL (ref 4.8–10.8)

## 2025-01-28 PROCEDURE — 83540 ASSAY OF IRON: CPT

## 2025-01-28 PROCEDURE — 83550 IRON BINDING TEST: CPT

## 2025-01-28 PROCEDURE — 82728 ASSAY OF FERRITIN: CPT

## 2025-01-28 PROCEDURE — 85025 COMPLETE CBC W/AUTO DIFF WBC: CPT

## 2025-01-28 PROCEDURE — 99212 OFFICE O/P EST SF 10 MIN: CPT

## 2025-01-28 PROCEDURE — 80053 COMPREHEN METABOLIC PANEL: CPT

## 2025-01-28 PROCEDURE — 36415 COLL VENOUS BLD VENIPUNCTURE: CPT

## 2025-01-28 NOTE — PROGRESS NOTES
Promacta 12.5 mg every other day  -CBC today  -CBC in 6 weeks     2. Iron deficiency anemia secondary to inadequate dietary iron intake.  Current recommendation is for ferrous sulfate 325 mg once daily. Iron substrates on 10/28/2024 continue to show positive response to the oral iron.   Hemoglobin 10.8, hematocrit 31.7 and MCV 91.1  She will continue her current treatment regimen.    10/28/2024 Serology results  Iron 66  TIBC 275  Saturation 24%  Ferritin 315.5    -Continue ferrous sulfate 325 mg p.o. daily, continues to have a positive response and tolerating without difficulty  -CMP, Iron Panel, Ferritin today    I discussed all of the above findings included in the assessment and plan with the patient and the patient is in agreement to move forward with current recommendations/treatment.  I have addressed all of their questions and concerns that were verbalized.    FOLLOW UP:   Follow-up appointment given for 3 months sooner if needed for ITP and iron deficiency anemia  CBC in 6 weeks  Continue to follow with other medical providers as recommended  Labs at next visit: CBC, CMP, Iron panel, Ferritin    EMR Dragon/Transcription disclaimer:   Much of this encounter note is an electronic transcription/translation of spoken language to printed text. The electronic translation of spoken language may permit erroneous, or at times, nonsensical words or phrases to be inadvertently transcribed; although attempts have made to review the note for such errors, some may still exist.  Please excuse any unrecognized transcription errors and contact us if the error is unintelligible or needs documented correction.  Also, portions of this note have been copied forward, however, changed to reflect the most current clinical status of this patient.    The patient (or guardian, if applicable) and other individuals in attendance with the patient were advised that Artificial Intelligence will be utilized during this visit to record,

## 2025-01-31 ASSESSMENT — ENCOUNTER SYMPTOMS
BLOOD IN STOOL: 0
CONSTIPATION: 0
DIARRHEA: 0
EYE REDNESS: 0
WHEEZING: 0
GASTROINTESTINAL NEGATIVE: 1
SORE THROAT: 0
EYE DISCHARGE: 0
COUGH: 0
BACK PAIN: 0
EYES NEGATIVE: 1
VOMITING: 0
SHORTNESS OF BREATH: 0
RESPIRATORY NEGATIVE: 1
ABDOMINAL PAIN: 0
EYE PAIN: 0
NAUSEA: 0

## 2025-02-12 DIAGNOSIS — R00.2 HEART PALPITATIONS: ICD-10-CM

## 2025-02-12 RX ORDER — AMLODIPINE BESYLATE 5 MG/1
5 TABLET ORAL DAILY
Qty: 90 TABLET | Refills: 3 | Status: SHIPPED | OUTPATIENT
Start: 2025-02-12

## 2025-02-12 RX ORDER — METOPROLOL SUCCINATE 50 MG/1
75 TABLET, EXTENDED RELEASE ORAL DAILY
Qty: 135 TABLET | Refills: 3 | Status: SHIPPED | OUTPATIENT
Start: 2025-02-12

## 2025-02-17 ENCOUNTER — OFFICE VISIT (OUTPATIENT)
Dept: OBGYN CLINIC | Age: 80
End: 2025-02-17
Payer: MEDICARE

## 2025-02-17 VITALS
BODY MASS INDEX: 18.75 KG/M2 | DIASTOLIC BLOOD PRESSURE: 77 MMHG | WEIGHT: 96 LBS | HEART RATE: 64 BPM | SYSTOLIC BLOOD PRESSURE: 172 MMHG

## 2025-02-17 DIAGNOSIS — N81.10 VAGINAL PROLAPSE: ICD-10-CM

## 2025-02-17 DIAGNOSIS — N95.2 VAGINAL ATROPHY: ICD-10-CM

## 2025-02-17 DIAGNOSIS — Z46.89 PESSARY MAINTENANCE: ICD-10-CM

## 2025-02-17 DIAGNOSIS — N81.11 CYSTOCELE, MIDLINE: Primary | ICD-10-CM

## 2025-02-17 PROCEDURE — 1036F TOBACCO NON-USER: CPT | Performed by: NURSE PRACTITIONER

## 2025-02-17 PROCEDURE — 99213 OFFICE O/P EST LOW 20 MIN: CPT | Performed by: NURSE PRACTITIONER

## 2025-02-17 PROCEDURE — G8400 PT W/DXA NO RESULTS DOC: HCPCS | Performed by: NURSE PRACTITIONER

## 2025-02-17 PROCEDURE — G8420 CALC BMI NORM PARAMETERS: HCPCS | Performed by: NURSE PRACTITIONER

## 2025-02-17 PROCEDURE — 1123F ACP DISCUSS/DSCN MKR DOCD: CPT | Performed by: NURSE PRACTITIONER

## 2025-02-17 PROCEDURE — G8427 DOCREV CUR MEDS BY ELIG CLIN: HCPCS | Performed by: NURSE PRACTITIONER

## 2025-02-17 PROCEDURE — 1159F MED LIST DOCD IN RCRD: CPT | Performed by: NURSE PRACTITIONER

## 2025-02-17 PROCEDURE — 1090F PRES/ABSN URINE INCON ASSESS: CPT | Performed by: NURSE PRACTITIONER

## 2025-02-17 ASSESSMENT — ENCOUNTER SYMPTOMS
ALLERGIC/IMMUNOLOGIC NEGATIVE: 1
EYES NEGATIVE: 1
DIARRHEA: 0
GASTROINTESTINAL NEGATIVE: 1
CONSTIPATION: 0
RESPIRATORY NEGATIVE: 1

## 2025-02-17 NOTE — PROGRESS NOTES
Val Hager is a 79 y.o. female who presents today for her medical conditions/ complaints as noted below. Val Hager is c/o of Follow-up (Pessary maintenance)        HPI  Pt presents for 3 month pessary maintenance. Has been doing well, no problems with pain or bleeding. Has been working well for prolapse.     No LMP recorded. Patient has had a hysterectomy.  No obstetric history on file.    Past Medical History:   Diagnosis Date    Arrhythmia     CAD (coronary artery disease)     Hyperlipidemia     Hypertension     Immune thrombocytopenic purpura (HCC) 7/26/2019    Sleep apnea     Systemic lupus erythematosus (HCC) 7/26/2019    Thrombocytopenia, unspecified (HCC) 7/26/2019     Past Surgical History:   Procedure Laterality Date    APPENDECTOMY      BREAST SURGERY      FOOT SURGERY      HYSTERECTOMY (CERVIX STATUS UNKNOWN)      TUBAL LIGATION       Family History   Problem Relation Age of Onset    Heart Disease Mother     Cancer Sister     Cancer Brother     Unknown Maternal Grandmother     Unknown Maternal Grandfather     Unknown Paternal Grandmother     Unknown Paternal Grandfather      Social History     Tobacco Use    Smoking status: Never    Smokeless tobacco: Never   Substance Use Topics    Alcohol use: Not Currently       Current Outpatient Medications   Medication Sig Dispense Refill    ferrous sulfate (FEROSUL) 325 (65 Fe) MG tablet Take 1 tablet by mouth 2 times daily 60 tablet 5    estradiol (ESTRACE) 0.1 MG/GM vaginal cream Place vaginally as needed      vitamin D (CHOLECALCIFEROL) 125 MCG (5000 UT) CAPS capsule Take 1 capsule by mouth daily      eltrombopag (PROMACTA) 12.5 MG TABS tablet Take 1 tablet by mouth daily 30 tablet 11    lisinopril (PRINIVIL;ZESTRIL) 40 MG tablet Take 1 tablet by mouth daily      amLODIPine (NORVASC) 2.5 MG tablet Take 2 tablets by mouth daily      Potassium (POTASSIMIN PO) Take by mouth daily      metoprolol succinate (TOPROL XL) 50 MG extended release tablet

## 2025-02-17 NOTE — PROGRESS NOTES
Pt is here for her 3 month pessary maintenance. She states that she is doing good with the pessary. She is having her right knee replaced on March 19 th 2025 with Dr Caballero. She states that her b/p is always elevated when she comes in the office.

## 2025-02-26 ENCOUNTER — HOSPITAL ENCOUNTER (OUTPATIENT)
Dept: PREADMISSION TESTING | Age: 80
Discharge: HOME | End: 2025-03-02
Payer: MEDICARE

## 2025-02-26 VITALS — HEIGHT: 60 IN | WEIGHT: 94 LBS | BODY MASS INDEX: 18.46 KG/M2

## 2025-02-26 DIAGNOSIS — Z29.9 PROPHYLACTIC MEASURE: Primary | ICD-10-CM

## 2025-02-26 LAB
ALBUMIN SERPL-MCNC: 5 G/DL (ref 3.5–5.2)
ALP SERPL-CCNC: 73 U/L (ref 35–104)
ALT SERPL-CCNC: 12 U/L (ref 5–33)
ANION GAP SERPL CALCULATED.3IONS-SCNC: 13 MMOL/L (ref 8–16)
APTT PPP: 26.9 SEC (ref 26–36.2)
AST SERPL-CCNC: 20 U/L (ref 5–32)
BACTERIA URNS QL MICRO: ABNORMAL /HPF
BASOPHILS # BLD: 0 K/UL (ref 0–0.2)
BASOPHILS NFR BLD: 0.7 % (ref 0–1)
BILIRUB SERPL-MCNC: 0.3 MG/DL (ref 0.2–1.2)
BILIRUB UR QL STRIP: NEGATIVE
BUN SERPL-MCNC: 17 MG/DL (ref 8–23)
CALCIUM SERPL-MCNC: 9.8 MG/DL (ref 8.8–10.2)
CHLORIDE SERPL-SCNC: 99 MMOL/L (ref 98–107)
CLARITY UR: ABNORMAL
CO2 SERPL-SCNC: 24 MMOL/L (ref 22–29)
COLOR UR: YELLOW
CREAT SERPL-MCNC: 0.9 MG/DL (ref 0.5–0.9)
CRYSTALS URNS MICRO: ABNORMAL /HPF
EOSINOPHIL # BLD: 0.1 K/UL (ref 0–0.6)
EOSINOPHIL NFR BLD: 1.2 % (ref 0–5)
EPI CELLS #/AREA URNS AUTO: 7 /HPF (ref 0–5)
ERYTHROCYTE [DISTWIDTH] IN BLOOD BY AUTOMATED COUNT: 12.5 % (ref 11.5–14.5)
GLUCOSE SERPL-MCNC: 86 MG/DL (ref 70–99)
GLUCOSE UR STRIP.AUTO-MCNC: NEGATIVE MG/DL
HCT VFR BLD AUTO: 34.1 % (ref 37–47)
HGB BLD-MCNC: 11.2 G/DL (ref 12–16)
HGB UR STRIP.AUTO-MCNC: ABNORMAL MG/L
HYALINE CASTS #/AREA URNS AUTO: 9 /HPF (ref 0–8)
IMM GRANULOCYTES # BLD: 0 K/UL
INR PPP: 1.01 (ref 0.88–1.18)
KETONES UR STRIP.AUTO-MCNC: NEGATIVE MG/DL
LEUKOCYTE ESTERASE UR QL STRIP.AUTO: ABNORMAL
LYMPHOCYTES # BLD: 1.3 K/UL (ref 1.1–4.5)
LYMPHOCYTES NFR BLD: 22 % (ref 20–40)
MCH RBC QN AUTO: 30.6 PG (ref 27–31)
MCHC RBC AUTO-ENTMCNC: 32.8 G/DL (ref 33–37)
MCV RBC AUTO: 93.2 FL (ref 81–99)
MONOCYTES # BLD: 0.6 K/UL (ref 0–0.9)
MONOCYTES NFR BLD: 9.3 % (ref 0–10)
MRSA DNA SPEC QL NAA+PROBE: NOT DETECTED
NEUTROPHILS # BLD: 4 K/UL (ref 1.5–7.5)
NEUTS SEG NFR BLD: 66.6 % (ref 50–65)
NITRITE UR QL STRIP.AUTO: NEGATIVE
PH UR STRIP.AUTO: 5 [PH] (ref 5–8)
PLATELET # BLD AUTO: 279 K/UL (ref 130–400)
PMV BLD AUTO: 9.2 FL (ref 9.4–12.3)
POTASSIUM SERPL-SCNC: 4.3 MMOL/L (ref 3.5–5.1)
PROT SERPL-MCNC: 7.2 G/DL (ref 6.4–8.3)
PROT UR STRIP.AUTO-MCNC: 100 MG/DL
PROTHROMBIN TIME: 13 SEC (ref 12–14.6)
RBC # BLD AUTO: 3.66 M/UL (ref 4.2–5.4)
RBC #/AREA URNS AUTO: 34 /HPF (ref 0–4)
SODIUM SERPL-SCNC: 136 MMOL/L (ref 136–145)
SP GR UR STRIP.AUTO: 1.02 (ref 1–1.03)
UROBILINOGEN UR STRIP.AUTO-MCNC: 0.2 E.U./DL
WBC # BLD AUTO: 6.1 K/UL (ref 4.8–10.8)
WBC #/AREA URNS AUTO: >900 /HPF (ref 0–5)

## 2025-02-26 PROCEDURE — 85730 THROMBOPLASTIN TIME PARTIAL: CPT

## 2025-02-26 PROCEDURE — 85025 COMPLETE CBC W/AUTO DIFF WBC: CPT

## 2025-02-26 PROCEDURE — 87077 CULTURE AEROBIC IDENTIFY: CPT

## 2025-02-26 PROCEDURE — 87641 MR-STAPH DNA AMP PROBE: CPT

## 2025-02-26 PROCEDURE — 87086 URINE CULTURE/COLONY COUNT: CPT

## 2025-02-26 PROCEDURE — 93005 ELECTROCARDIOGRAM TRACING: CPT | Performed by: ORTHOPAEDIC SURGERY

## 2025-02-26 PROCEDURE — 81001 URINALYSIS AUTO W/SCOPE: CPT

## 2025-02-26 PROCEDURE — 87186 SC STD MICRODIL/AGAR DIL: CPT

## 2025-02-26 PROCEDURE — 85610 PROTHROMBIN TIME: CPT

## 2025-02-26 PROCEDURE — 80053 COMPREHEN METABOLIC PANEL: CPT

## 2025-02-26 RX ORDER — PREGABALIN 25 MG/1
75 CAPSULE ORAL ONCE
OUTPATIENT
Start: 2025-03-19

## 2025-02-26 RX ORDER — ACETAMINOPHEN 325 MG/1
1000 TABLET ORAL ONCE
OUTPATIENT
Start: 2025-03-19

## 2025-02-26 RX ORDER — CELECOXIB 100 MG/1
200 CAPSULE ORAL ONCE
OUTPATIENT
Start: 2025-03-19

## 2025-02-26 NOTE — DISCHARGE INSTRUCTIONS
process.    The hair clipping will occur in the same day surgery room you are assigned.    If you normally shave your body hair, please refrain from doing so 24-48 hours prior to your procedure time. Shaving can cause knicks and cuts in the skin and allow microorganisms to enter your body that could lead to infection.    For your procedure, your body hair will be clipped circumferentially from your upper thigh to your mid shin.           The morning of surgery, you may take all your prescribed medications with a sip of water unless instructed not to take.  Any exceptions to this would be listed below:  DO NOT TAKE YOUR LISINOPRIL THE MORNING OF SURGERY.    Take your prescribed betablocker METOPROLOL with a sip of water the morning of surgery.    Always follow your surgeon or providers instructions on taking blood thinners.            PREOPERATIVE GUIDELINES WHEN RECEIVING ANESTHESIA    Do not eat anything after midnight the night before your surgery. You may have water up to 2 hours before your arrival time. No gum or candy the morning of surgery.  This is extremely important for your safety.    Take a bath (or shower) the night before your surgery and you may brush your teeth the morning of your surgery.    You will be scheduled to arrive at the hospital 2 hours before your surgery, or follow your surgeon's instructions.    Dress comfortably.  Wear loose clothing that will be easy to remove and comfortable for your trip home.    You may wear eyeglasses but bring your cases with you as they must be remove before your surgery. If you wear contacts they will have to be removed before your surgery.    Hearing aids and dentures will need to be removed before your surgery. If you wear dentures, do not glue them in the morning of surgery.     Do not wear any jewelry, including body jewelry.  All jewelry will need to be removed prior to your surgery. This includes wedding rings. Any metal touching your body can cause a

## 2025-02-27 ENCOUNTER — TELEPHONE (OUTPATIENT)
Dept: HEMATOLOGY | Age: 80
End: 2025-02-27

## 2025-02-27 LAB
EKG P AXIS: 74 DEGREES
EKG P-R INTERVAL: 164 MS
EKG Q-T INTERVAL: 410 MS
EKG QRS DURATION: 92 MS
EKG QTC CALCULATION (BAZETT): 420 MS
EKG T AXIS: 46 DEGREES

## 2025-02-27 PROCEDURE — 93010 ELECTROCARDIOGRAM REPORT: CPT | Performed by: INTERNAL MEDICINE

## 2025-02-27 RX ORDER — MUPIROCIN 20 MG/G
OINTMENT TOPICAL
Qty: 1 EACH | Refills: 0 | Status: SHIPPED | OUTPATIENT
Start: 2025-02-27

## 2025-02-27 NOTE — TELEPHONE ENCOUNTER
Patient called stating that she had lab work completed yesterday for pre-op.  Reviewed CBC results with patient.  Informed to continue same does of Promacta 12.5 mg daily.  Patient is having a knee replacement on 03/19. Has a follow up with Jenny on 05/02.  Instructed to call with any problems.  Patient v/u.

## 2025-02-28 ENCOUNTER — TELEPHONE (OUTPATIENT)
Age: 80
End: 2025-02-28

## 2025-02-28 DIAGNOSIS — A49.9 UTI (URINARY TRACT INFECTION), BACTERIAL: Primary | ICD-10-CM

## 2025-02-28 DIAGNOSIS — N39.0 UTI (URINARY TRACT INFECTION), BACTERIAL: Primary | ICD-10-CM

## 2025-02-28 RX ORDER — SULFAMETHOXAZOLE AND TRIMETHOPRIM 800; 160 MG/1; MG/1
1 TABLET ORAL 2 TIMES DAILY
Qty: 10 TABLET | Refills: 0 | Status: SHIPPED | OUTPATIENT
Start: 2025-02-28 | End: 2025-03-05

## 2025-02-28 NOTE — TELEPHONE ENCOUNTER
Called patient to let her know she has UTI. Patient states that she had some burning several days ago. Per Gregorio STEVEN we will send her out bactrim DS bid x 5 days. Patient verbalized understanding.

## 2025-03-02 LAB
BACTERIA UR CULT: ABNORMAL
ORGANISM: ABNORMAL

## 2025-03-12 ASSESSMENT — PROMIS GLOBAL HEALTH SCALE
IN GENERAL, HOW WOULD YOU RATE YOUR SATISFACTION WITH YOUR SOCIAL ACTIVITIES AND RELATIONSHIPS [ON A SCALE OF 1 (POOR) TO 5 (EXCELLENT)]?: VERY GOOD
IN THE PAST 7 DAYS, HOW WOULD YOU RATE YOUR PAIN ON AVERAGE [ON A SCALE FROM 0 (NO PAIN) TO 10 (WORST IMAGINABLE PAIN)]?: 7
SUM OF RESPONSES TO QUESTIONS 2, 4, 5, & 10: 15
IN GENERAL, WOULD YOU SAY YOUR QUALITY OF LIFE IS...[ON A SCALE OF 1 (POOR) TO 5 (EXCELLENT)]: GOOD
HOW IS THE PROMIS V1.1 BEING ADMINISTERED?: TELEPHONE
IN THE PAST 7 DAYS, HOW WOULD YOU RATE YOUR FATIGUE ON AVERAGE [ON A SCALE FROM 1 (NONE) TO 5 (VERY SEVERE)]?: MODERATE
IN GENERAL, WOULD YOU SAY YOUR HEALTH IS...[ON A SCALE OF 1 (POOR) TO 5 (EXCELLENT)]: GOOD
IN THE PAST 7 DAYS, HOW OFTEN HAVE YOU BEEN BOTHERED BY EMOTIONAL PROBLEMS, SUCH AS FEELING ANXIOUS, DEPRESSED, OR IRRITABLE [ON A SCALE FROM 1 (NEVER) TO 5 (ALWAYS)]?: RARELY
TO WHAT EXTENT ARE YOU ABLE TO CARRY OUT YOUR EVERYDAY PHYSICAL ACTIVITIES SUCH AS WALKING, CLIMBING STAIRS, CARRYING GROCERIES, OR MOVING A CHAIR [ON A SCALE OF 1 (NOT AT ALL) TO 5 (COMPLETELY)]?: MODERATELY
SUM OF RESPONSES TO QUESTIONS 3, 6, 7, & 8: 16
IN GENERAL, HOW WOULD YOU RATE YOUR MENTAL HEALTH, INCLUDING YOUR MOOD AND YOUR ABILITY TO THINK [ON A SCALE OF 1 (POOR) TO 5 (EXCELLENT)]?: VERY GOOD
IN GENERAL, HOW WOULD YOU RATE YOUR PHYSICAL HEALTH [ON A SCALE OF 1 (POOR) TO 5 (EXCELLENT)]?: GOOD
WHO IS THE PERSON COMPLETING THE PROMIS V1.1 SURVEY?: SELF
IN GENERAL, PLEASE RATE HOW WELL YOU CARRY OUT YOUR USUAL SOCIAL ACTIVITIES (INCLUDES ACTIVITIES AT HOME, AT WORK, AND IN YOUR COMMUNITY, AND RESPONSIBILITIES AS A PARENT, CHILD, SPOUSE, EMPLOYEE, FRIEND, ETC) [ON A SCALE OF 1 (POOR) TO 5 (EXCELLENT)]?: FAIR

## 2025-03-12 ASSESSMENT — KOOS JR
TWISING OR PIVOTING ON KNEE: MODERATE
GOING UP OR DOWN STAIRS: SEVERE
KOOS JR TOTAL INTERVAL SCORE: 39.625
RISING FROM SITTING: SEVERE
BENDING TO THE FLOOR TO PICK UP OBJECT: SEVERE
STRAIGHTENING KNEE FULLY: SEVERE
HOW SEVERE IS YOUR KNEE STIFFNESS AFTER FIRST WAKING IN MORNING: SEVERE
STANDING UPRIGHT: MODERATE

## 2025-03-13 DIAGNOSIS — M17.11 PRIMARY OSTEOARTHRITIS OF RIGHT KNEE: Primary | ICD-10-CM

## 2025-03-19 ENCOUNTER — ANESTHESIA EVENT (OUTPATIENT)
Dept: OPERATING ROOM | Age: 80
End: 2025-03-19
Payer: MEDICARE

## 2025-03-19 ENCOUNTER — ANESTHESIA (OUTPATIENT)
Dept: OPERATING ROOM | Age: 80
End: 2025-03-19
Payer: MEDICARE

## 2025-03-19 ENCOUNTER — APPOINTMENT (OUTPATIENT)
Dept: GENERAL RADIOLOGY | Age: 80
End: 2025-03-19
Attending: ORTHOPAEDIC SURGERY
Payer: MEDICARE

## 2025-03-19 ENCOUNTER — HOSPITAL ENCOUNTER (OUTPATIENT)
Age: 80
Setting detail: OBSERVATION
Discharge: HOME HEALTH | End: 2025-03-22
Attending: ORTHOPAEDIC SURGERY | Admitting: ORTHOPAEDIC SURGERY
Payer: MEDICARE

## 2025-03-19 DIAGNOSIS — M17.11 PRIMARY OSTEOARTHRITIS OF RIGHT KNEE: Primary | ICD-10-CM

## 2025-03-19 PROBLEM — M17.10 ARTHRITIS OF KNEE: Status: ACTIVE | Noted: 2025-03-19

## 2025-03-19 PROBLEM — I10 PRIMARY HYPERTENSION: Status: ACTIVE | Noted: 2025-03-19

## 2025-03-19 PROBLEM — K59.03 DRUG-INDUCED CONSTIPATION: Status: ACTIVE | Noted: 2025-03-19

## 2025-03-19 LAB
GLUCOSE BLD-MCNC: 138 MG/DL (ref 70–99)
GLUCOSE BLD-MCNC: 170 MG/DL (ref 70–99)
PERFORMED ON: ABNORMAL
PERFORMED ON: ABNORMAL

## 2025-03-19 PROCEDURE — 94150 VITAL CAPACITY TEST: CPT

## 2025-03-19 PROCEDURE — 6360000002 HC RX W HCPCS: Performed by: ANESTHESIOLOGY

## 2025-03-19 PROCEDURE — C1776 JOINT DEVICE (IMPLANTABLE): HCPCS | Performed by: ORTHOPAEDIC SURGERY

## 2025-03-19 PROCEDURE — 27447 TOTAL KNEE ARTHROPLASTY: CPT | Performed by: ORTHOPAEDIC SURGERY

## 2025-03-19 PROCEDURE — 3700000001 HC ADD 15 MINUTES (ANESTHESIA): Performed by: ORTHOPAEDIC SURGERY

## 2025-03-19 PROCEDURE — 6370000000 HC RX 637 (ALT 250 FOR IP): Performed by: ORTHOPAEDIC SURGERY

## 2025-03-19 PROCEDURE — 6360000002 HC RX W HCPCS: Performed by: ORTHOPAEDIC SURGERY

## 2025-03-19 PROCEDURE — 7100000001 HC PACU RECOVERY - ADDTL 15 MIN: Performed by: ORTHOPAEDIC SURGERY

## 2025-03-19 PROCEDURE — 6370000000 HC RX 637 (ALT 250 FOR IP): Performed by: FAMILY MEDICINE

## 2025-03-19 PROCEDURE — 2720000010 HC SURG SUPPLY STERILE: Performed by: ORTHOPAEDIC SURGERY

## 2025-03-19 PROCEDURE — 2580000003 HC RX 258: Performed by: ORTHOPAEDIC SURGERY

## 2025-03-19 PROCEDURE — 2580000003 HC RX 258: Performed by: NURSE ANESTHETIST, CERTIFIED REGISTERED

## 2025-03-19 PROCEDURE — 2709999900 HC NON-CHARGEABLE SUPPLY: Performed by: ORTHOPAEDIC SURGERY

## 2025-03-19 PROCEDURE — 7100000000 HC PACU RECOVERY - FIRST 15 MIN: Performed by: ORTHOPAEDIC SURGERY

## 2025-03-19 PROCEDURE — 6360000002 HC RX W HCPCS: Performed by: NURSE ANESTHETIST, CERTIFIED REGISTERED

## 2025-03-19 PROCEDURE — 2580000003 HC RX 258: Performed by: ANESTHESIOLOGY

## 2025-03-19 PROCEDURE — 82962 GLUCOSE BLOOD TEST: CPT

## 2025-03-19 PROCEDURE — 3600000015 HC SURGERY LEVEL 5 ADDTL 15MIN: Performed by: ORTHOPAEDIC SURGERY

## 2025-03-19 PROCEDURE — 2500000003 HC RX 250 WO HCPCS: Performed by: ORTHOPAEDIC SURGERY

## 2025-03-19 PROCEDURE — C1713 ANCHOR/SCREW BN/BN,TIS/BN: HCPCS | Performed by: ORTHOPAEDIC SURGERY

## 2025-03-19 PROCEDURE — G0378 HOSPITAL OBSERVATION PER HR: HCPCS

## 2025-03-19 PROCEDURE — 3700000000 HC ANESTHESIA ATTENDED CARE: Performed by: ORTHOPAEDIC SURGERY

## 2025-03-19 PROCEDURE — 2500000003 HC RX 250 WO HCPCS: Performed by: NURSE ANESTHETIST, CERTIFIED REGISTERED

## 2025-03-19 PROCEDURE — 64447 NJX AA&/STRD FEMORAL NRV IMG: CPT | Performed by: NURSE ANESTHETIST, CERTIFIED REGISTERED

## 2025-03-19 PROCEDURE — 73560 X-RAY EXAM OF KNEE 1 OR 2: CPT

## 2025-03-19 PROCEDURE — 2500000003 HC RX 250 WO HCPCS: Performed by: ANESTHESIOLOGY

## 2025-03-19 PROCEDURE — 94760 N-INVAS EAR/PLS OXIMETRY 1: CPT

## 2025-03-19 PROCEDURE — 3600000005 HC SURGERY LEVEL 5 BASE: Performed by: ORTHOPAEDIC SURGERY

## 2025-03-19 DEVICE — COMPONENT FEM CR NAR 7 RT KNEE CEM COCR STRL PERSONA LTX: Type: IMPLANTABLE DEVICE | Site: KNEE | Status: FUNCTIONAL

## 2025-03-19 DEVICE — IMPLANTABLE DEVICE
Type: IMPLANTABLE DEVICE | Site: KNEE | Status: FUNCTIONAL
Brand: PERSONA® VIVACIT-E®

## 2025-03-19 DEVICE — IMPLANTABLE DEVICE: Type: IMPLANTABLE DEVICE | Site: KNEE | Status: FUNCTIONAL

## 2025-03-19 DEVICE — CEMENT BONE 40 GM W/ GENTMYCN HI VISC PALACOS R+G: Type: IMPLANTABLE DEVICE | Site: KNEE | Status: FUNCTIONAL

## 2025-03-19 RX ORDER — PROCHLORPERAZINE EDISYLATE 5 MG/ML
10 INJECTION INTRAMUSCULAR; INTRAVENOUS EVERY 6 HOURS PRN
Status: DISCONTINUED | OUTPATIENT
Start: 2025-03-19 | End: 2025-03-22 | Stop reason: HOSPADM

## 2025-03-19 RX ORDER — SODIUM CHLORIDE 9 MG/ML
INJECTION, SOLUTION INTRAVENOUS CONTINUOUS
Status: DISCONTINUED | OUTPATIENT
Start: 2025-03-19 | End: 2025-03-21

## 2025-03-19 RX ORDER — FENTANYL CITRATE 50 UG/ML
INJECTION, SOLUTION INTRAMUSCULAR; INTRAVENOUS
Status: DISCONTINUED | OUTPATIENT
Start: 2025-03-19 | End: 2025-03-19 | Stop reason: SDUPTHER

## 2025-03-19 RX ORDER — DIPHENHYDRAMINE HCL 25 MG
25 TABLET ORAL EVERY 6 HOURS PRN
Status: DISCONTINUED | OUTPATIENT
Start: 2025-03-19 | End: 2025-03-22 | Stop reason: HOSPADM

## 2025-03-19 RX ORDER — PREGABALIN 75 MG/1
75 CAPSULE ORAL ONCE
Status: COMPLETED | OUTPATIENT
Start: 2025-03-19 | End: 2025-03-19

## 2025-03-19 RX ORDER — SODIUM CHLORIDE 0.9 % (FLUSH) 0.9 %
5-40 SYRINGE (ML) INJECTION PRN
Status: DISCONTINUED | OUTPATIENT
Start: 2025-03-19 | End: 2025-03-19 | Stop reason: HOSPADM

## 2025-03-19 RX ORDER — ASPIRIN 81 MG/1
81 TABLET ORAL 2 TIMES DAILY
Qty: 84 TABLET | Refills: 0 | Status: SHIPPED | OUTPATIENT
Start: 2025-03-19 | End: 2025-04-30

## 2025-03-19 RX ORDER — APREPITANT 40 MG/1
40 CAPSULE ORAL ONCE
Status: COMPLETED | OUTPATIENT
Start: 2025-03-19 | End: 2025-03-19

## 2025-03-19 RX ORDER — SODIUM CHLORIDE, SODIUM LACTATE, POTASSIUM CHLORIDE, CALCIUM CHLORIDE 600; 310; 30; 20 MG/100ML; MG/100ML; MG/100ML; MG/100ML
INJECTION, SOLUTION INTRAVENOUS CONTINUOUS
Status: DISCONTINUED | OUTPATIENT
Start: 2025-03-19 | End: 2025-03-19 | Stop reason: HOSPADM

## 2025-03-19 RX ORDER — HYDROMORPHONE HYDROCHLORIDE 1 MG/ML
1 INJECTION, SOLUTION INTRAMUSCULAR; INTRAVENOUS; SUBCUTANEOUS
Status: DISCONTINUED | OUTPATIENT
Start: 2025-03-19 | End: 2025-03-22 | Stop reason: HOSPADM

## 2025-03-19 RX ORDER — PROPOFOL 10 MG/ML
INJECTION, EMULSION INTRAVENOUS
Status: DISCONTINUED | OUTPATIENT
Start: 2025-03-19 | End: 2025-03-19 | Stop reason: SDUPTHER

## 2025-03-19 RX ORDER — CEFAZOLIN SODIUM 1 G/3ML
INJECTION, POWDER, FOR SOLUTION INTRAMUSCULAR; INTRAVENOUS
Status: DISCONTINUED | OUTPATIENT
Start: 2025-03-19 | End: 2025-03-19 | Stop reason: SDUPTHER

## 2025-03-19 RX ORDER — SODIUM CHLORIDE 9 MG/ML
INJECTION, SOLUTION INTRAVENOUS
Status: DISCONTINUED | OUTPATIENT
Start: 2025-03-19 | End: 2025-03-19 | Stop reason: SDUPTHER

## 2025-03-19 RX ORDER — HYDROMORPHONE HYDROCHLORIDE 2 MG/1
2 TABLET ORAL SEE ADMIN INSTRUCTIONS
Qty: 90 TABLET | Refills: 0 | Status: SHIPPED | OUTPATIENT
Start: 2025-03-19 | End: 2025-03-20 | Stop reason: HOSPADM

## 2025-03-19 RX ORDER — HYDRALAZINE HYDROCHLORIDE 20 MG/ML
10 INJECTION INTRAMUSCULAR; INTRAVENOUS
Status: DISCONTINUED | OUTPATIENT
Start: 2025-03-19 | End: 2025-03-19 | Stop reason: HOSPADM

## 2025-03-19 RX ORDER — CELECOXIB 200 MG/1
200 CAPSULE ORAL ONCE
Status: COMPLETED | OUTPATIENT
Start: 2025-03-19 | End: 2025-03-19

## 2025-03-19 RX ORDER — LIDOCAINE HYDROCHLORIDE 10 MG/ML
1 INJECTION, SOLUTION EPIDURAL; INFILTRATION; INTRACAUDAL; PERINEURAL
Status: DISCONTINUED | OUTPATIENT
Start: 2025-03-19 | End: 2025-03-19 | Stop reason: HOSPADM

## 2025-03-19 RX ORDER — SIMETHICONE 80 MG
80 TABLET,CHEWABLE ORAL EVERY 6 HOURS PRN
Status: DISCONTINUED | OUTPATIENT
Start: 2025-03-19 | End: 2025-03-22 | Stop reason: HOSPADM

## 2025-03-19 RX ORDER — ASPIRIN 81 MG/1
81 TABLET ORAL 2 TIMES DAILY
Status: DISCONTINUED | OUTPATIENT
Start: 2025-03-19 | End: 2025-03-22 | Stop reason: HOSPADM

## 2025-03-19 RX ORDER — EPHEDRINE SULFATE/0.9% NACL/PF 25 MG/5 ML
SYRINGE (ML) INTRAVENOUS
Status: DISCONTINUED | OUTPATIENT
Start: 2025-03-19 | End: 2025-03-19 | Stop reason: SDUPTHER

## 2025-03-19 RX ORDER — METOCLOPRAMIDE HYDROCHLORIDE 5 MG/ML
10 INJECTION INTRAMUSCULAR; INTRAVENOUS
Status: DISCONTINUED | OUTPATIENT
Start: 2025-03-19 | End: 2025-03-19 | Stop reason: HOSPADM

## 2025-03-19 RX ORDER — POLYETHYLENE GLYCOL 3350 17 G/17G
17 POWDER, FOR SOLUTION ORAL 2 TIMES DAILY
Status: DISCONTINUED | OUTPATIENT
Start: 2025-03-19 | End: 2025-03-22 | Stop reason: HOSPADM

## 2025-03-19 RX ORDER — HYDROMORPHONE HYDROCHLORIDE 1 MG/ML
0.5 INJECTION, SOLUTION INTRAMUSCULAR; INTRAVENOUS; SUBCUTANEOUS
Status: DISCONTINUED | OUTPATIENT
Start: 2025-03-19 | End: 2025-03-22 | Stop reason: HOSPADM

## 2025-03-19 RX ORDER — HYDROMORPHONE HYDROCHLORIDE 1 MG/ML
0.25 INJECTION, SOLUTION INTRAMUSCULAR; INTRAVENOUS; SUBCUTANEOUS
Status: DISCONTINUED | OUTPATIENT
Start: 2025-03-19 | End: 2025-03-22 | Stop reason: HOSPADM

## 2025-03-19 RX ORDER — NALOXONE HYDROCHLORIDE 0.4 MG/ML
INJECTION, SOLUTION INTRAMUSCULAR; INTRAVENOUS; SUBCUTANEOUS PRN
Status: DISCONTINUED | OUTPATIENT
Start: 2025-03-19 | End: 2025-03-19 | Stop reason: HOSPADM

## 2025-03-19 RX ORDER — SODIUM CHLORIDE 9 MG/ML
INJECTION, SOLUTION INTRAVENOUS PRN
Status: DISCONTINUED | OUTPATIENT
Start: 2025-03-19 | End: 2025-03-22 | Stop reason: HOSPADM

## 2025-03-19 RX ORDER — HYDROMORPHONE HYDROCHLORIDE 2 MG/1
4 TABLET ORAL
Status: DISCONTINUED | OUTPATIENT
Start: 2025-03-19 | End: 2025-03-22 | Stop reason: HOSPADM

## 2025-03-19 RX ORDER — ACETAMINOPHEN 325 MG/1
650 TABLET ORAL EVERY 6 HOURS
Status: DISCONTINUED | OUTPATIENT
Start: 2025-03-19 | End: 2025-03-22 | Stop reason: HOSPADM

## 2025-03-19 RX ORDER — 0.9 % SODIUM CHLORIDE 0.9 %
500 INTRAVENOUS SOLUTION INTRAVENOUS PRN
Status: DISCONTINUED | OUTPATIENT
Start: 2025-03-19 | End: 2025-03-22 | Stop reason: HOSPADM

## 2025-03-19 RX ORDER — ROCURONIUM BROMIDE 10 MG/ML
INJECTION, SOLUTION INTRAVENOUS
Status: DISCONTINUED | OUTPATIENT
Start: 2025-03-19 | End: 2025-03-19 | Stop reason: SDUPTHER

## 2025-03-19 RX ORDER — BISACODYL 5 MG/1
5 TABLET, DELAYED RELEASE ORAL DAILY
Status: DISCONTINUED | OUTPATIENT
Start: 2025-03-19 | End: 2025-03-22 | Stop reason: HOSPADM

## 2025-03-19 RX ORDER — TRANEXAMIC ACID 100 MG/ML
INJECTION, SOLUTION INTRAVENOUS
Status: DISCONTINUED | OUTPATIENT
Start: 2025-03-19 | End: 2025-03-19 | Stop reason: SDUPTHER

## 2025-03-19 RX ORDER — VITAMIN B COMPLEX
1000 TABLET ORAL DAILY
Status: DISCONTINUED | OUTPATIENT
Start: 2025-03-19 | End: 2025-03-22 | Stop reason: HOSPADM

## 2025-03-19 RX ORDER — SODIUM CHLORIDE 9 MG/ML
INJECTION, SOLUTION INTRAVENOUS PRN
Status: DISCONTINUED | OUTPATIENT
Start: 2025-03-19 | End: 2025-03-19 | Stop reason: HOSPADM

## 2025-03-19 RX ORDER — DEXAMETHASONE SODIUM PHOSPHATE 10 MG/ML
INJECTION, SOLUTION INTRAMUSCULAR; INTRAVENOUS
Status: DISCONTINUED | OUTPATIENT
Start: 2025-03-19 | End: 2025-03-19 | Stop reason: SDUPTHER

## 2025-03-19 RX ORDER — DIPHENHYDRAMINE HYDROCHLORIDE 50 MG/ML
12.5 INJECTION, SOLUTION INTRAMUSCULAR; INTRAVENOUS
Status: DISCONTINUED | OUTPATIENT
Start: 2025-03-19 | End: 2025-03-19 | Stop reason: HOSPADM

## 2025-03-19 RX ORDER — PROMETHAZINE HYDROCHLORIDE 12.5 MG/1
12.5 TABLET ORAL EVERY 6 HOURS PRN
Qty: 30 TABLET | Refills: 0 | Status: SHIPPED | OUTPATIENT
Start: 2025-03-19 | End: 2025-03-26

## 2025-03-19 RX ORDER — CYCLOBENZAPRINE HCL 10 MG
10 TABLET ORAL EVERY 12 HOURS PRN
Status: DISCONTINUED | OUTPATIENT
Start: 2025-03-19 | End: 2025-03-22 | Stop reason: HOSPADM

## 2025-03-19 RX ORDER — ROPIVACAINE HYDROCHLORIDE 5 MG/ML
30 INJECTION, SOLUTION EPIDURAL; INFILTRATION; PERINEURAL ONCE
Status: DISCONTINUED | OUTPATIENT
Start: 2025-03-19 | End: 2025-03-19 | Stop reason: HOSPADM

## 2025-03-19 RX ORDER — FERROUS SULFATE 325(65) MG
325 TABLET ORAL 2 TIMES DAILY
Status: DISCONTINUED | OUTPATIENT
Start: 2025-03-19 | End: 2025-03-22 | Stop reason: HOSPADM

## 2025-03-19 RX ORDER — DEXAMETHASONE SODIUM PHOSPHATE 10 MG/ML
10 INJECTION, SOLUTION INTRAMUSCULAR; INTRAVENOUS ONCE
Status: DISCONTINUED | OUTPATIENT
Start: 2025-03-19 | End: 2025-03-19 | Stop reason: HOSPADM

## 2025-03-19 RX ORDER — LABETALOL HYDROCHLORIDE 5 MG/ML
10 INJECTION, SOLUTION INTRAVENOUS
Status: DISCONTINUED | OUTPATIENT
Start: 2025-03-19 | End: 2025-03-19 | Stop reason: HOSPADM

## 2025-03-19 RX ORDER — IPRATROPIUM BROMIDE AND ALBUTEROL SULFATE 2.5; .5 MG/3ML; MG/3ML
1 SOLUTION RESPIRATORY (INHALATION)
Status: DISCONTINUED | OUTPATIENT
Start: 2025-03-19 | End: 2025-03-19 | Stop reason: HOSPADM

## 2025-03-19 RX ORDER — SODIUM CHLORIDE 0.9 % (FLUSH) 0.9 %
5-40 SYRINGE (ML) INJECTION PRN
Status: DISCONTINUED | OUTPATIENT
Start: 2025-03-19 | End: 2025-03-22 | Stop reason: HOSPADM

## 2025-03-19 RX ORDER — ROPIVACAINE HYDROCHLORIDE 5 MG/ML
INJECTION, SOLUTION EPIDURAL; INFILTRATION; PERINEURAL
Status: COMPLETED | OUTPATIENT
Start: 2025-03-19 | End: 2025-03-19

## 2025-03-19 RX ORDER — HYDROMORPHONE HYDROCHLORIDE 1 MG/ML
0.25 INJECTION, SOLUTION INTRAMUSCULAR; INTRAVENOUS; SUBCUTANEOUS EVERY 5 MIN PRN
Status: COMPLETED | OUTPATIENT
Start: 2025-03-19 | End: 2025-03-19

## 2025-03-19 RX ORDER — METOPROLOL SUCCINATE 50 MG/1
50 TABLET, EXTENDED RELEASE ORAL DAILY
Status: DISCONTINUED | OUTPATIENT
Start: 2025-03-20 | End: 2025-03-22 | Stop reason: HOSPADM

## 2025-03-19 RX ORDER — SODIUM CHLORIDE 0.9 % (FLUSH) 0.9 %
5-40 SYRINGE (ML) INJECTION EVERY 12 HOURS SCHEDULED
Status: DISCONTINUED | OUTPATIENT
Start: 2025-03-19 | End: 2025-03-19 | Stop reason: HOSPADM

## 2025-03-19 RX ORDER — SODIUM CHLORIDE 0.9 % (FLUSH) 0.9 %
5-40 SYRINGE (ML) INJECTION EVERY 12 HOURS SCHEDULED
Status: DISCONTINUED | OUTPATIENT
Start: 2025-03-19 | End: 2025-03-22 | Stop reason: HOSPADM

## 2025-03-19 RX ORDER — MIDAZOLAM HYDROCHLORIDE 2 MG/2ML
2 INJECTION, SOLUTION INTRAMUSCULAR; INTRAVENOUS
Status: COMPLETED | OUTPATIENT
Start: 2025-03-19 | End: 2025-03-19

## 2025-03-19 RX ORDER — DIPHENHYDRAMINE HYDROCHLORIDE 50 MG/ML
25 INJECTION, SOLUTION INTRAMUSCULAR; INTRAVENOUS EVERY 6 HOURS PRN
Status: DISCONTINUED | OUTPATIENT
Start: 2025-03-19 | End: 2025-03-22 | Stop reason: HOSPADM

## 2025-03-19 RX ORDER — ACETAMINOPHEN 500 MG
1000 TABLET ORAL ONCE
Status: COMPLETED | OUTPATIENT
Start: 2025-03-19 | End: 2025-03-19

## 2025-03-19 RX ORDER — HYDROMORPHONE HYDROCHLORIDE 2 MG/1
2 TABLET ORAL
Status: DISCONTINUED | OUTPATIENT
Start: 2025-03-19 | End: 2025-03-22 | Stop reason: HOSPADM

## 2025-03-19 RX ORDER — LIDOCAINE HYDROCHLORIDE 10 MG/ML
INJECTION, SOLUTION EPIDURAL; INFILTRATION; INTRACAUDAL; PERINEURAL
Status: DISCONTINUED | OUTPATIENT
Start: 2025-03-19 | End: 2025-03-19 | Stop reason: SDUPTHER

## 2025-03-19 RX ADMIN — TRANEXAMIC ACID 1000 MG: 1 INJECTION, SOLUTION INTRAVENOUS at 08:50

## 2025-03-19 RX ADMIN — FERROUS SULFATE TAB 325 MG (65 MG ELEMENTAL FE) 325 MG: 325 (65 FE) TAB at 13:44

## 2025-03-19 RX ADMIN — SUGAMMADEX 100 MG: 100 INJECTION, SOLUTION INTRAVENOUS at 10:34

## 2025-03-19 RX ADMIN — ACETAMINOPHEN 1000 MG: 500 TABLET ORAL at 07:11

## 2025-03-19 RX ADMIN — CELECOXIB 200 MG: 200 CAPSULE ORAL at 07:11

## 2025-03-19 RX ADMIN — HYDROMORPHONE HYDROCHLORIDE 0.25 MG: 1 INJECTION, SOLUTION INTRAMUSCULAR; INTRAVENOUS; SUBCUTANEOUS at 11:07

## 2025-03-19 RX ADMIN — WATER 1000 MG: 1 INJECTION INTRAMUSCULAR; INTRAVENOUS; SUBCUTANEOUS at 16:30

## 2025-03-19 RX ADMIN — FERROUS SULFATE TAB 325 MG (65 MG ELEMENTAL FE) 325 MG: 325 (65 FE) TAB at 19:36

## 2025-03-19 RX ADMIN — PREGABALIN 75 MG: 75 CAPSULE ORAL at 07:11

## 2025-03-19 RX ADMIN — EPHEDRINE SULFATE 5 MG: 5 INJECTION INTRAVENOUS at 10:02

## 2025-03-19 RX ADMIN — EPHEDRINE SULFATE 10 MG: 5 INJECTION INTRAVENOUS at 08:55

## 2025-03-19 RX ADMIN — FENTANYL CITRATE 50 MCG: 0.05 INJECTION, SOLUTION INTRAMUSCULAR; INTRAVENOUS at 08:44

## 2025-03-19 RX ADMIN — CEFAZOLIN 2 G: 1 INJECTION, POWDER, FOR SOLUTION INTRAMUSCULAR; INTRAVENOUS at 08:50

## 2025-03-19 RX ADMIN — LIDOCAINE HYDROCHLORIDE 50 MG: 10 INJECTION, SOLUTION EPIDURAL; INFILTRATION; INTRACAUDAL; PERINEURAL at 08:44

## 2025-03-19 RX ADMIN — SIMETHICONE 80 MG: 80 TABLET, CHEWABLE ORAL at 19:36

## 2025-03-19 RX ADMIN — TRANEXAMIC ACID 1000 MG: 1 INJECTION, SOLUTION INTRAVENOUS at 10:23

## 2025-03-19 RX ADMIN — SODIUM CHLORIDE, PRESERVATIVE FREE 10 ML: 5 INJECTION INTRAVENOUS at 19:52

## 2025-03-19 RX ADMIN — SODIUM CHLORIDE: 9 INJECTION, SOLUTION INTRAVENOUS at 10:07

## 2025-03-19 RX ADMIN — ROPIVACAINE HYDROCHLORIDE 15 ML: 5 INJECTION, SOLUTION EPIDURAL; INFILTRATION; PERINEURAL at 08:06

## 2025-03-19 RX ADMIN — SODIUM CHLORIDE, PRESERVATIVE FREE 20 MG: 5 INJECTION INTRAVENOUS at 07:53

## 2025-03-19 RX ADMIN — PROPOFOL 50 MG: 10 INJECTION, EMULSION INTRAVENOUS at 08:44

## 2025-03-19 RX ADMIN — HYDROMORPHONE HYDROCHLORIDE 0.25 MG: 1 INJECTION, SOLUTION INTRAMUSCULAR; INTRAVENOUS; SUBCUTANEOUS at 10:55

## 2025-03-19 RX ADMIN — ACETAMINOPHEN 650 MG: 325 TABLET ORAL at 13:44

## 2025-03-19 RX ADMIN — ASPIRIN 81 MG: 81 TABLET, COATED ORAL at 13:44

## 2025-03-19 RX ADMIN — FENTANYL CITRATE 50 MCG: 0.05 INJECTION, SOLUTION INTRAMUSCULAR; INTRAVENOUS at 10:40

## 2025-03-19 RX ADMIN — HYDROMORPHONE HYDROCHLORIDE 0.25 MG: 1 INJECTION, SOLUTION INTRAMUSCULAR; INTRAVENOUS; SUBCUTANEOUS at 11:02

## 2025-03-19 RX ADMIN — APREPITANT 40 MG: 40 CAPSULE ORAL at 07:53

## 2025-03-19 RX ADMIN — PROPOFOL 20 MG: 10 INJECTION, EMULSION INTRAVENOUS at 08:45

## 2025-03-19 RX ADMIN — MIDAZOLAM HYDROCHLORIDE 1 MG: 1 INJECTION, SOLUTION INTRAMUSCULAR; INTRAVENOUS at 08:02

## 2025-03-19 RX ADMIN — ROCURONIUM BROMIDE 50 MG: 10 INJECTION, SOLUTION INTRAVENOUS at 08:44

## 2025-03-19 RX ADMIN — DEXAMETHASONE SODIUM PHOSPHATE 10 MG: 10 INJECTION, SOLUTION INTRAMUSCULAR; INTRAVENOUS at 08:50

## 2025-03-19 RX ADMIN — SODIUM CHLORIDE: 0.9 INJECTION, SOLUTION INTRAVENOUS at 13:45

## 2025-03-19 RX ADMIN — EPHEDRINE SULFATE 10 MG: 5 INJECTION INTRAVENOUS at 09:16

## 2025-03-19 RX ADMIN — EPHEDRINE SULFATE 10 MG: 5 INJECTION INTRAVENOUS at 10:23

## 2025-03-19 RX ADMIN — ASPIRIN 81 MG: 81 TABLET, COATED ORAL at 19:36

## 2025-03-19 RX ADMIN — SODIUM CHLORIDE, SODIUM LACTATE, POTASSIUM CHLORIDE, AND CALCIUM CHLORIDE: .6; .31; .03; .02 INJECTION, SOLUTION INTRAVENOUS at 07:11

## 2025-03-19 RX ADMIN — ACETAMINOPHEN 650 MG: 325 TABLET ORAL at 19:35

## 2025-03-19 RX ADMIN — BISACODYL 5 MG: 5 TABLET, COATED ORAL at 13:44

## 2025-03-19 RX ADMIN — HYDROMORPHONE HYDROCHLORIDE 0.25 MG: 1 INJECTION, SOLUTION INTRAMUSCULAR; INTRAVENOUS; SUBCUTANEOUS at 11:11

## 2025-03-19 RX ADMIN — POLYETHYLENE GLYCOL 3350 17 G: 17 POWDER, FOR SOLUTION ORAL at 19:41

## 2025-03-19 ASSESSMENT — PAIN DESCRIPTION - ORIENTATION
ORIENTATION: RIGHT

## 2025-03-19 ASSESSMENT — PAIN SCALES - GENERAL
PAINLEVEL_OUTOF10: 7
PAINLEVEL_OUTOF10: 6
PAINLEVEL_OUTOF10: 6
PAINLEVEL_OUTOF10: 7

## 2025-03-19 ASSESSMENT — LIFESTYLE VARIABLES: SMOKING_STATUS: 0

## 2025-03-19 ASSESSMENT — PAIN DESCRIPTION - LOCATION
LOCATION: LEG

## 2025-03-19 ASSESSMENT — PAIN - FUNCTIONAL ASSESSMENT: PAIN_FUNCTIONAL_ASSESSMENT: 0-10

## 2025-03-19 NOTE — ANESTHESIA POSTPROCEDURE EVALUATION
Department of Anesthesiology  Postprocedure Note    Patient: Val Hager  MRN: [id number]  YOB: 1945  Date of evaluation: 3/19/2025    Procedure Summary       Date: 03/19/25 Room / Location: [address]    Anesthesia Start: 0838 Anesthesia Stop: 1043    Procedure: ROBOTIC RIGHT TOTAL KNEE ARTHROPLASTY (Right) Diagnosis:       Unilateral primary osteoarthritis, right knee      (Unilateral primary osteoarthritis, right knee [M17.11])    Surgeons: Chalino Caballero MD Responsible Provider: Charlee Muñoz APRN - CRNA    Anesthesia Type: regional, general ASA Status: 3            Anesthesia Type: No value filed.    Severo Phase I: Severo Score: 10    Severo Phase II:      Anesthesia Post Evaluation    Patient location during evaluation: PACU  Patient participation: complete - patient participated  Level of consciousness: sleepy but conscious  Pain score: 0  Airway patency: patent  Nausea & Vomiting: no nausea and no vomiting  Cardiovascular status: hemodynamically stable and blood pressure returned to baseline  Respiratory status: acceptable and room air  Hydration status: stable  Comments: /62   Pulse 70   Temp 97.7 °F (36.5 °C)   Resp 20   Ht 1.524 m (5')   Wt 42.6 kg (94 lb)   SpO2 99%   BMI 18.36 kg/m²     Pain management: adequate    No notable events documented.

## 2025-03-19 NOTE — PROGRESS NOTES
4 Eyes Skin Assessment     NAME:  Val Hager  YOB: 1945  MEDICAL RECORD NUMBER:  [id number]    The patient is being assessed for  Admission    I agree that at least one RN has performed a thorough Head to Toe Skin Assessment on the patient. ALL assessment sites listed below have been assessed.      Areas assessed by both nurses:    Head, Face, Ears, Shoulders, Back, Chest, Arms, Elbows, Hands, Sacrum. Buttock, Coccyx, Ischium, and Legs. Feet and Heels        Does the Patient have a Wound? No noted wound(s)       Wolf Prevention initiated by RN: No  Wound Care Orders initiated by RN: No    Pressure Injury (Stage 3,4, Unstageable, DTI, NWPT, and Complex wounds) if present, place Wound referral order by RN under : No    New Ostomies, if present place, Ostomy referral order under : No     Nurse 1 eSignature: Electronically signed by Jeanne Shaffer RN on 3/19/25 at 12:04 PM CDT    **SHARE this note so that the co-signing nurse can place an eSignature**    Nurse 2 eSignature: {Esignature:[id number]}

## 2025-03-19 NOTE — H&P
[facility] Pre-Operative History and Physical    Patient Name: Nuno  : 1945    BP (!) 148/65   Pulse 65   Temp 98.4 °F (36.9 °C) (Temporal)   Resp 16   Ht 1.524 m (5')   Wt 42.6 kg (94 lb)   SpO2 100%   BMI 18.36 kg/m²     Pre-Operative Diagnosis:  Knee arthritits    Proposed Surgical Procedure:   Knee replacement      Past Medical Hisotry:       Diagnosis Date    Arrhythmia     CAD (coronary artery disease)     Hyperlipidemia     Hypertension     Immune thrombocytopenic purpura (HCC) 2019    PONV (postoperative nausea and vomiting)     Presence of pessary     Sleep apnea     Systemic lupus erythematosus (HCC) 2019    Thrombocytopenia, unspecified 2019     Past Surgical History:       Procedure Laterality Date    APPENDECTOMY      BREAST SURGERY      COLONOSCOPY      FOOT SURGERY Right     HYSTERECTOMY (CERVIX STATUS UNKNOWN)      TUBAL LIGATION         Medications:   Prior to Admission medications    Medication Sig Start Date End Date Taking? Authorizing Provider   mupirocin (BACTROBAN) 2 % ointment Swab each nostril bid x 7 days pre op and am of surgery. 25  Yes Chalino Caballero MD   vitamin D (VITAMIN D3) 25 MCG (1000 UT) CAPS Take 1 capsule by mouth daily   Yes ProviderSunday MD   ferrous sulfate (FEROSUL) 325 (65 Fe) MG tablet Take 1 tablet by mouth 2 times daily 24  Yes Jenny Stahl APRN   eltrombopag (PROMACTA) 12.5 MG TABS tablet Take 1 tablet by mouth daily 23  Yes Jenny Stahl APRN   lisinopril (PRINIVIL;ZESTRIL) 40 MG tablet Take 1 tablet by mouth daily   Yes ProviderSunday MD   amLODIPine (NORVASC) 2.5 MG tablet Take 2 tablets by mouth daily   Yes ProviderSunday MD   Potassium (POTASSIMIN PO) Take 99 mg by mouth daily   Yes Sunday Taylor MD   metoprolol succinate (TOPROL XL) 50 MG extended release tablet Take 1 tablet by mouth daily   Yes ProviderSunday MD   hydroxychloroquine (PLAQUENIL) 200 MG tablet  1.01 02/26/2025 01:15 PM     PTT:    Lab Results   Component Value Date/Time    APTT 26.9 02/26/2025 01:15 PM   [APTT}  U/A:    Lab Results   Component Value Date/Time    COLORU YELLOW 02/26/2025 01:15 PM    PHUR 5.0 02/26/2025 01:15 PM    PHUR 5.5 11/23/2022 12:00 AM    WBCUA >900 02/26/2025 01:15 PM    RBCUA 34 02/26/2025 01:15 PM    YEAST 0 11/23/2022 12:00 AM    BACTERIA 4+ 02/26/2025 01:15 PM    CLARITYU TURBID 02/26/2025 01:15 PM    LEUKOCYTESUR LARGE 02/26/2025 01:15 PM    UROBILINOGEN 0.2 02/26/2025 01:15 PM    BILIRUBINUR Negative 02/26/2025 01:15 PM    BLOODU MODERATE 02/26/2025 01:15 PM    GLUCOSEU Negative 02/26/2025 01:15 PM     HgBA1c:  No components found for: \"HGBA1C\"        PLAN:  Knee replacement      Electronically signed by Chalino Caballero MD on 3/19/2025 at 6:52 AM.

## 2025-03-19 NOTE — OP NOTE
[id number] [place] [address]                            OPERATIVE REPORT      PATIENT NAME: DEWEY LAUGHLIN          : 1945  MED REC NO: [id number]                          ROOM: [id number]  ACCOUNT NO: [id number]                       ADMIT DATE: 2025  PROVIDER: Chalino Caballero MD      DATE OF PROCEDURE:  2025    SURGEON:  Chalino Caballero MD    PREOPERATIVE DIAGNOSIS:  Primary osteoarthritis, right knee.    POSTOPERATIVE DIAGNOSIS:  Primary osteoarthritis, right knee.    PROCEDURE:  Robotic-assisted right total knee replacement using Padma ERMA system, imageless system.    FIRST ASSISTANT:  Ghassan.    ANESTHESIA:  General with adductor canal block.    ESTIMATED BLOOD LOSS:  50 mL.    FLUIDS:  1000 mL.    URINE OUTPUT:  100 mL.    TOURNIQUET TIME:  27 minutes.    COMPONENTS USED:  Padma Persona knee system: Femur, size 7 narrow CR PPS femur; tibia, size D 0-degree cemented tibia; polyethylene, size 10 MC polyethylene.    INDICATIONS:  A 80-year-old lady with severe end-stage arthritis of right knee, failing conservative care.  Because of this, elected for the above procedure.    DESCRIPTION OF PROCEDURE:  After informed consent, she was given 2 g of Ancef and 1 g of tranexamic acid, underwent adductor canal block and general anesthetic. Tourniquet was placed on the right upper thigh. Goodson catheter inserted. Left leg placed in SCD. Right leg prepped and draped in usual sterile fashion. Leg was esmarched, tourniquet inflated to 250 mmHg.  Midline incision was made.  Parapatellar approach made.  Superomedial and proximal tibial arrays were placed.  Synovium and prepatellar fat pad excised.    Robotic analysis revealed 8 degrees of flexion,  3.5 degrees of varus. In extension; 2 medial, 1 lateral.  In flexion, 2.5 medial and 0 lateral.    Femoral settings; size 7 femur, 3.5 degrees of external rotation, 2 degrees of varus, 3 degrees of 
(2) more than 100 beats/min

## 2025-03-19 NOTE — ANESTHESIA PRE PROCEDURE
Department of Anesthesiology  Preprocedure Note       Name:  Val Hager   Age:  80 y.o.  :  1945                                          MRN:  [id number]         Date:  3/19/2025      Surgeon: Surgeon(s):  Chalino Caballero MD    Procedure: Procedure(s):  ROBOTIC RIGHT TOTAL KNEE ARTHROPLASTY    Medications prior to admission:   Prior to Admission medications    Medication Sig Start Date End Date Taking? Authorizing Provider   mupirocin (BACTROBAN) 2 % ointment Swab each nostril bid x 7 days pre op and am of surgery. 25  Yes Chalino Caballero MD   vitamin D (VITAMIN D3) 25 MCG (1000 UT) CAPS Take 1 capsule by mouth daily   Yes Provider, MD Sunday   ferrous sulfate (FEROSUL) 325 (65 Fe) MG tablet Take 1 tablet by mouth 2 times daily 24  Yes Jenny Stahl APRN   eltrombopag (PROMACTA) 12.5 MG TABS tablet Take 1 tablet by mouth daily 23  Yes Jenny Stahl APRN   lisinopril (PRINIVIL;ZESTRIL) 40 MG tablet Take 1 tablet by mouth daily   Yes Provider, MD Sunday   amLODIPine (NORVASC) 2.5 MG tablet Take 2 tablets by mouth daily   Yes Provider, Historical, MD   Potassium (POTASSIMIN PO) Take 99 mg by mouth daily   Yes Provider, Historical, MD   metoprolol succinate (TOPROL XL) 50 MG extended release tablet Take 1 tablet by mouth daily   Yes Provider, MD Sunday   hydroxychloroquine (PLAQUENIL) 200 MG tablet Take by mouth daily   Yes Provider, Historical, MD   naloxone 4 MG/0.1ML LIQD nasal spray 1 spray by Nasal route as needed for Opioid Reversal 25   Chalino Caballero MD       Current medications:    Current Facility-Administered Medications   Medication Dose Route Frequency Provider Last Rate Last Admin   • lactated ringers infusion   IntraVENous Continuous Lisa Howard  mL/hr at 25 0711 New Bag at 25 0711   • dexAMETHasone (PF) (DECADRON) injection 10 mg  10 mg IntraVENous Once Chalino Caballero MD       • ceFAZolin (ANCEF) 2,000 mg in sterile water 20

## 2025-03-20 PROBLEM — R73.9 HYPERGLYCEMIA: Status: ACTIVE | Noted: 2025-03-20

## 2025-03-20 LAB
ANION GAP SERPL CALCULATED.3IONS-SCNC: 10 MMOL/L (ref 8–16)
BUN SERPL-MCNC: 16 MG/DL (ref 8–23)
CALCIUM SERPL-MCNC: 7.7 MG/DL (ref 8.8–10.2)
CHLORIDE SERPL-SCNC: 104 MMOL/L (ref 98–107)
CO2 SERPL-SCNC: 19 MMOL/L (ref 22–29)
CREAT SERPL-MCNC: 0.7 MG/DL (ref 0.5–0.9)
FOLATE SERPL-MCNC: 11.3 NG/ML (ref 4.8–37.3)
GLUCOSE BLD-MCNC: 106 MG/DL (ref 70–99)
GLUCOSE BLD-MCNC: 114 MG/DL (ref 70–99)
GLUCOSE BLD-MCNC: 99 MG/DL (ref 70–99)
GLUCOSE SERPL-MCNC: 115 MG/DL (ref 70–99)
HCT VFR BLD AUTO: 24.7 % (ref 37–47)
HGB BLD-MCNC: 8.1 G/DL (ref 12–16)
IRON SATN MFR SERPL: 44 % (ref 15–50)
IRON SERPL-MCNC: 91 UG/DL (ref 37–145)
PERFORMED ON: ABNORMAL
PERFORMED ON: ABNORMAL
PERFORMED ON: NORMAL
PLATELET # BLD AUTO: 136 K/UL (ref 130–400)
POTASSIUM SERPL-SCNC: 4.2 MMOL/L (ref 3.5–5)
SODIUM SERPL-SCNC: 133 MMOL/L (ref 136–145)
TIBC SERPL-MCNC: 205 UG/DL (ref 250–400)
VIT B12 SERPL-MCNC: 351 PG/ML (ref 232–1245)

## 2025-03-20 PROCEDURE — 83550 IRON BINDING TEST: CPT

## 2025-03-20 PROCEDURE — 6370000000 HC RX 637 (ALT 250 FOR IP): Performed by: ORTHOPAEDIC SURGERY

## 2025-03-20 PROCEDURE — 97165 OT EVAL LOW COMPLEX 30 MIN: CPT

## 2025-03-20 PROCEDURE — G0378 HOSPITAL OBSERVATION PER HR: HCPCS

## 2025-03-20 PROCEDURE — 80048 BASIC METABOLIC PNL TOTAL CA: CPT

## 2025-03-20 PROCEDURE — 6360000002 HC RX W HCPCS: Performed by: ORTHOPAEDIC SURGERY

## 2025-03-20 PROCEDURE — 97161 PT EVAL LOW COMPLEX 20 MIN: CPT

## 2025-03-20 PROCEDURE — 2500000003 HC RX 250 WO HCPCS: Performed by: ORTHOPAEDIC SURGERY

## 2025-03-20 PROCEDURE — 94150 VITAL CAPACITY TEST: CPT

## 2025-03-20 PROCEDURE — 85018 HEMOGLOBIN: CPT

## 2025-03-20 PROCEDURE — 99024 POSTOP FOLLOW-UP VISIT: CPT

## 2025-03-20 PROCEDURE — 36415 COLL VENOUS BLD VENIPUNCTURE: CPT

## 2025-03-20 PROCEDURE — 96361 HYDRATE IV INFUSION ADD-ON: CPT

## 2025-03-20 PROCEDURE — 82607 VITAMIN B-12: CPT

## 2025-03-20 PROCEDURE — 82746 ASSAY OF FOLIC ACID SERUM: CPT

## 2025-03-20 PROCEDURE — 85049 AUTOMATED PLATELET COUNT: CPT

## 2025-03-20 PROCEDURE — 97116 GAIT TRAINING THERAPY: CPT

## 2025-03-20 PROCEDURE — 97535 SELF CARE MNGMENT TRAINING: CPT

## 2025-03-20 PROCEDURE — 83540 ASSAY OF IRON: CPT

## 2025-03-20 PROCEDURE — 96374 THER/PROPH/DIAG INJ IV PUSH: CPT

## 2025-03-20 PROCEDURE — 82962 GLUCOSE BLOOD TEST: CPT

## 2025-03-20 PROCEDURE — 6370000000 HC RX 637 (ALT 250 FOR IP): Performed by: FAMILY MEDICINE

## 2025-03-20 PROCEDURE — 85014 HEMATOCRIT: CPT

## 2025-03-20 PROCEDURE — 94760 N-INVAS EAR/PLS OXIMETRY 1: CPT

## 2025-03-20 RX ORDER — HYDROCODONE BITARTRATE AND ACETAMINOPHEN 5; 325 MG/1; MG/1
1 TABLET ORAL EVERY 4 HOURS PRN
Qty: 18 TABLET | Refills: 0 | Status: SHIPPED | OUTPATIENT
Start: 2025-03-20 | End: 2025-05-29

## 2025-03-20 RX ORDER — CALCIUM CARBONATE 500 MG/1
500 TABLET, CHEWABLE ORAL 3 TIMES DAILY PRN
Status: DISCONTINUED | OUTPATIENT
Start: 2025-03-20 | End: 2025-03-22 | Stop reason: HOSPADM

## 2025-03-20 RX ADMIN — METOPROLOL SUCCINATE 50 MG: 50 TABLET, EXTENDED RELEASE ORAL at 07:37

## 2025-03-20 RX ADMIN — PROCHLORPERAZINE EDISYLATE 10 MG: 5 INJECTION INTRAMUSCULAR; INTRAVENOUS at 09:08

## 2025-03-20 RX ADMIN — Medication 1000 UNITS: at 07:37

## 2025-03-20 RX ADMIN — POLYETHYLENE GLYCOL 3350 17 G: 17 POWDER, FOR SOLUTION ORAL at 19:49

## 2025-03-20 RX ADMIN — WATER 1000 MG: 1 INJECTION INTRAMUSCULAR; INTRAVENOUS; SUBCUTANEOUS at 00:16

## 2025-03-20 RX ADMIN — HYDROMORPHONE HYDROCHLORIDE 2 MG: 2 TABLET ORAL at 11:30

## 2025-03-20 RX ADMIN — HYDROMORPHONE HYDROCHLORIDE 2 MG: 2 TABLET ORAL at 16:33

## 2025-03-20 RX ADMIN — ACETAMINOPHEN 650 MG: 325 TABLET ORAL at 06:33

## 2025-03-20 RX ADMIN — ACETAMINOPHEN 650 MG: 325 TABLET ORAL at 19:49

## 2025-03-20 RX ADMIN — ACETAMINOPHEN 650 MG: 325 TABLET ORAL at 12:46

## 2025-03-20 RX ADMIN — WATER 1000 MG: 1 INJECTION INTRAMUSCULAR; INTRAVENOUS; SUBCUTANEOUS at 07:37

## 2025-03-20 RX ADMIN — HYDROMORPHONE HYDROCHLORIDE 2 MG: 2 TABLET ORAL at 06:36

## 2025-03-20 RX ADMIN — HYDROMORPHONE HYDROCHLORIDE 2 MG: 2 TABLET ORAL at 19:49

## 2025-03-20 RX ADMIN — HYDROMORPHONE HYDROCHLORIDE 2 MG: 2 TABLET ORAL at 00:15

## 2025-03-20 RX ADMIN — HYDROMORPHONE HYDROCHLORIDE 2 MG: 2 TABLET ORAL at 23:42

## 2025-03-20 RX ADMIN — FERROUS SULFATE TAB 325 MG (65 MG ELEMENTAL FE) 325 MG: 325 (65 FE) TAB at 07:37

## 2025-03-20 RX ADMIN — ASPIRIN 81 MG: 81 TABLET, COATED ORAL at 19:49

## 2025-03-20 RX ADMIN — POLYETHYLENE GLYCOL 3350 17 G: 17 POWDER, FOR SOLUTION ORAL at 07:38

## 2025-03-20 RX ADMIN — ANTACID TABLETS 500 MG: 500 TABLET, CHEWABLE ORAL at 01:37

## 2025-03-20 RX ADMIN — WATER 1000 MG: 1 INJECTION INTRAMUSCULAR; INTRAVENOUS; SUBCUTANEOUS at 16:34

## 2025-03-20 RX ADMIN — ASPIRIN 81 MG: 81 TABLET, COATED ORAL at 07:37

## 2025-03-20 RX ADMIN — FERROUS SULFATE TAB 325 MG (65 MG ELEMENTAL FE) 325 MG: 325 (65 FE) TAB at 19:49

## 2025-03-20 RX ADMIN — BISACODYL 5 MG: 5 TABLET, COATED ORAL at 07:37

## 2025-03-20 ASSESSMENT — PAIN SCALES - GENERAL
PAINLEVEL_OUTOF10: 8
PAINLEVEL_OUTOF10: 6
PAINLEVEL_OUTOF10: 6
PAINLEVEL_OUTOF10: 1
PAINLEVEL_OUTOF10: 6
PAINLEVEL_OUTOF10: 6
PAINLEVEL_OUTOF10: 8
PAINLEVEL_OUTOF10: 9
PAINLEVEL_OUTOF10: 9

## 2025-03-20 ASSESSMENT — PAIN DESCRIPTION - DESCRIPTORS
DESCRIPTORS: ACHING
DESCRIPTORS: ACHING
DESCRIPTORS: SORE
DESCRIPTORS: ACHING
DESCRIPTORS: ACHING
DESCRIPTORS: PATIENT UNABLE TO DESCRIBE
DESCRIPTORS: ACHING;THROBBING

## 2025-03-20 ASSESSMENT — PAIN DESCRIPTION - LOCATION
LOCATION: HIP
LOCATION: KNEE

## 2025-03-20 ASSESSMENT — PAIN - FUNCTIONAL ASSESSMENT
PAIN_FUNCTIONAL_ASSESSMENT: PREVENTS OR INTERFERES SOME ACTIVE ACTIVITIES AND ADLS
PAIN_FUNCTIONAL_ASSESSMENT: ACTIVITIES ARE NOT PREVENTED
PAIN_FUNCTIONAL_ASSESSMENT: ACTIVITIES ARE NOT PREVENTED

## 2025-03-20 ASSESSMENT — PAIN DESCRIPTION - FREQUENCY: FREQUENCY: CONTINUOUS

## 2025-03-20 ASSESSMENT — PAIN DESCRIPTION - ORIENTATION
ORIENTATION: RIGHT

## 2025-03-20 ASSESSMENT — PAIN DESCRIPTION - PAIN TYPE: TYPE: ACUTE PAIN

## 2025-03-20 NOTE — ANESTHESIA POSTPROCEDURE EVALUATION
Department of Anesthesiology  Postprocedure Note    Patient: Val Hager  MRN: [id number]  YOB: 1945  Date of evaluation: 3/20/2025    Procedure Summary       Date: 03/19/25 Room / Location: [address]    Anesthesia Start: 0838 Anesthesia Stop: 1043    Procedure: ROBOTIC RIGHT TOTAL KNEE ARTHROPLASTY (Right) Diagnosis:       Unilateral primary osteoarthritis, right knee      (Unilateral primary osteoarthritis, right knee [M17.11])    Surgeons: Chalino Caballero MD Responsible Provider: Charlee Muñoz APRN - CRNA    Anesthesia Type: regional, general ASA Status: 3            Anesthesia Type: No value filed.    Severo Phase I: Severo Score: 10    Severo Phase II:      Anesthesia Post Evaluation    Patient location during evaluation: floor  Patient participation: complete - patient participated  Level of consciousness: awake and alert  Pain score: 3  Airway patency: patent  Nausea & Vomiting: nausea and vomiting (after pain meds today)  Cardiovascular status: hemodynamically stable  Respiratory status: acceptable  Hydration status: stable  Multimodal analgesia pain management approach  Pain management: adequate    No notable events documented.

## 2025-03-20 NOTE — CONSULTS
Referring Provider: Dr. Caballero  Reason for Consultation: Medical management    Patient Care Team:  Myah Dominguez APRN - NP as PCP - General (Nurse Practitioner)  Jenny Stahl APRN as Nurse Practitioner (Clinical Nurse Specialist, Adult Health)    Chief complaint arthritis of knee    Subjective .     History of present illness:  The patient presents to the orthopedic service for TKA. They have failed outpatient conservative treatment of NSAIDS, muscle relaxer, physical therapy and opioid pain meds. The pain is affecting their activities of daily living and they have chosen to undergo surgical correction.  We have been asked to provide medical consultation by the attending physician since their primary care provider does not attend here at [facility].  Our role in their healthcare during the perioperative phase was discussed with the patient.  All questions were encouraged and answered to the best of our ability. The postoperative pain is as expected. There are no other participating or relieving factors noted.     Postop day #1 from right total knee arthroplasty.  Patient with iron deficiency anemia on presentation will follow her iron/hemoglobin levels.  History of thrombocytopenia.  Will hold off on treatment in the immediate postoperative period.  Up ambulate today work on discharge home for tomorrow.      REVIEW OF SYSTEMS:      A Full 14 point review of systems is negative outside those listed above and in the HPI      History    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    PONV (postoperative nausea and vomiting)     Presence of pessary     Sleep apnea     Systemic lupus erythematosus (HCC) 07/26/2019    Thrombocytopenia, unspecified 07/26/2019     Past Surgical History:   Procedure Laterality Date    APPENDECTOMY      BREAST SURGERY      COLONOSCOPY      FOOT

## 2025-03-20 NOTE — CARE COORDINATION
Patient has [facility]. This was set up prior to admission. Please fax DC Summary and AVS upon discharge.  [facility]   P  [phone]  Electronically signed by Rajinder Verde RN, BSN on 3/20/2025 at 11:12 AM

## 2025-03-20 NOTE — PROGRESS NOTES
Subjective:     Post-Operative Day: 1 Status Post right TKA  Systemic or Specific Complaints:No Complaints  no nausea Pain 2    Patient sitting up in bed this am. A&Ox3. Patient was able to walk to bathroom with PT this morning and sit in chair.     Objective:     Patient Vitals for the past 24 hrs:   BP Temp Temp src Pulse Resp SpO2   03/20/25 1052 (!) 116/59 98.2 °F (36.8 °C) Temporal 59 16 99 %   03/20/25 1049 -- -- -- -- -- 99 %   03/20/25 0656 137/67 98.4 °F (36.9 °C) Temporal 77 16 100 %   03/20/25 0636 -- -- -- -- 18 --   03/20/25 0501 124/61 97 °F (36.1 °C) Temporal 58 16 99 %   03/20/25 0000 127/63 97.6 °F (36.4 °C) Temporal 80 16 100 %   03/19/25 2015 (!) 114/52 97.3 °F (36.3 °C) Temporal 62 16 98 %   03/19/25 1650 130/60 97 °F (36.1 °C) Temporal 62 12 96 %   03/19/25 1448 (!) 134/59 97 °F (36.1 °C) Temporal 58 12 98 %   03/19/25 1343 124/61 96.8 °F (36 °C) Temporal 62 16 99 %   03/19/25 1218 133/67 (!) 96.6 °F (35.9 °C) Temporal 60 16 100 %   03/19/25 1143 130/61 96.8 °F (36 °C) Temporal 64 16 97 %   03/19/25 1115 (!) 142/58 97.7 °F (36.5 °C) Temporal 66 16 98 %   03/19/25 1111 -- -- -- -- 18 --   03/19/25 1110 133/75 -- -- 64 15 98 %   03/19/25 1107 -- -- -- -- 23 --   03/19/25 1105 134/80 -- -- 68 14 98 %   03/19/25 1102 -- -- -- -- 15 --   03/19/25 1100 (!) 142/73 -- -- 68 22 98 %       General: alert, appears stated age, and cooperative   Exam: Incision clean, dry, and intact, no evidence of infection.   Neurovascular: Exam normal. Cap refill <2 seconds in all extremities.     Data Review:  Recent Labs     03/20/25 0427   HGB 8.1*     Recent Labs     03/20/25 0427   *   K 4.2   CREATININE 0.7     Recent Labs     03/20/25 0427   LABGLOM 88           Assessment:     Status Post right TKA. Doing well postoperatively.     Plan:     Continues current post-op course  Plan to DC home tomorrow if medically stable      Electronically signed by BETTINA Boles CNP on 3/20/2025 at 10:57  AM

## 2025-03-20 NOTE — CARE COORDINATION
Ashley Brooks RN Ortho Navigator  [phone]    Patient would like dme items (walker with wheels and bedside commode) to be delivered to [facility] Room #532.  Please call if you have any questions.  Electronically signed by Ashley Brooks RN on 3/20/2025 at 1:55 PM    Dewey Laughlin  3/19/2025  6:12 AM   Admission  Description: 80 year old female Department: L 5 Surg Services     Patient Demographics    Name Patient ID SSN Gender Identity Birth Date   Dewey Laughlin [id number] Female [birth year] (80 yrs)     Address Phone Email     [address]   [facility] [address] [phone] (H)   [phone] (M) --       Reg Status PCP Date Last Verified Next Review Date    Verified Myah Dominguez APRN - IRENE  [phone] 02/26/25 03/28/25      Insurance Payors as of 3/20/2025    MEDICARE    Plan: MEDICARE PART A AND B Member: [id number] Effective from: 3/1/2012   Subscriber: DEWEY LAUGHLIN Subscriber ID: [id number] Guarantor: DEWEY LAUGHLIN     [place]    [place] Group: [id number] Member: [id number]   Effective from: 12/1/2016 Subscriber: DEWEY LAUGHLIN Subscriber ID: [id number]   Guarantor: DEWEY LAUGHLIN     Emergency Contacts    Name Relation Home Work Mobile   Anthony Laughlin Spouse [phone]   Esther Cain Child [phone]   Rick Laughlin Child [phone]       Other Contacts    None on File    Electronically signed by Ashley Brooks RN on 3/20/2025 at 1:57 PM

## 2025-03-20 NOTE — PROGRESS NOTES
Physical Therapy  Name: Val Hager  MRN:  [id number]  Date of service:  3/20/2025     03/20/25 1423   Restrictions/Precautions   Restrictions/Precautions Fall Risk;Weight Bearing   Lower Extremity Weight Bearing Restrictions   Right Lower Extremity Weight Bearing Weight Bearing As Tolerated   Subjective   Subjective Pt agreed to therapy.   Pain Assessment   Pain Assessment 0-10   Pain Level 8   Pain Location Knee   Pain Orientation Right   Pain Descriptors Sore  (states it is not pain but \"soreness\")   Functional Pain Assessment Prevents or interferes some active activities and ADLs   Pain Type Acute pain   Pain Frequency Continuous   Non-Pharmaceutical Pain Intervention(s) Ambulation/Increased Activity;Repositioned;Rest;Ice  (reports she had something for pain recently)   Response to Pain Intervention Patient satisfied   Bed Mobility   Supine to Sit Stand by assistance   Sit to Supine Stand by assistance   Transfers   Sit to Stand Minimal Assistance;Contact guard assistance   Stand to Sit Contact guard assistance   Ambulation   WB Status FWBAT RLE   Ambulation   Surface Level tile   Device Rolling Walker   Assistance Minimal assistance;Contact guard assistance   Quality of Gait v/c's to place foot flat instead of walking on R heel   Gait Deviations Slow Jennifer;Decreased step length;Decreased step height   Distance 35'   Patient Goals    Patient Goals  go home   Short Term Goals   Time Frame for Short Term Goals 2 wks   Short Term Goal 1 supine to sit indep   Short Term Goal 2 sit to stand indep   Short Term Goal 3 amb. 100' with RW SBA   Short Term Goal 4 up/down 3 stairs with rail CGA   Conditions Requiring Skilled Therapeutic Intervention   Body Structures, Functions, Activity Limitations Requiring Skilled Therapeutic Intervention Decreased functional mobility ;Decreased ADL status;Decreased ROM;Decreased strength;Decreased balance   Assessment Pt able to increase amb distance with no LOB. Steady overall

## 2025-03-20 NOTE — CARE COORDINATION
03/20/25 0924   IMM Letter   Observation Status Letter date given: 03/20/25   Observation Status Letter time given: 0900   Observation Status Letter given to Patient/Family/Significant other/Guardian/POA/by: ebony MONTES letter given to patient/family with oral explanation and questions addressed by:     Signed letter placed in pt chart.   Electronically signed by Ebony Diaz on 3/20/2025 at 9:25 AM

## 2025-03-20 NOTE — PROGRESS NOTES
Occupational Therapy Initial Assessment  Date: 3/20/2025   Patient Name: Val Hager  MRN: [id number]     : 1945    Date of Service: 3/20/2025    Discharge Recommendations:  24 hour supervision or assist       Assessment   Assessment: Evaluation and single-tx completed this date. All education and training completed regarding ADLs. OT anticipates no overt barriers to stated discharge plan, including DME/AE and support resources.  No Skilled OT: At baseline function;Safe to return home  REQUIRES OT FOLLOW-UP: No  Activity Tolerance  Activity Tolerance: Patient Tolerated treatment well              Patient Diagnosis(es): The encounter diagnosis was Primary osteoarthritis of right knee.    Past Medical History:   Past Medical History:   Diagnosis Date    Arrhythmia     CAD (coronary artery disease)     Hyperlipidemia     Hypertension     Immune thrombocytopenic purpura (HCC) 2019    PONV (postoperative nausea and vomiting)     Presence of pessary     Sleep apnea     Systemic lupus erythematosus (HCC) 2019    Thrombocytopenia, unspecified 2019        Past Surgical History:   Past Surgical History:   Procedure Laterality Date    APPENDECTOMY      BREAST SURGERY      COLONOSCOPY      FOOT SURGERY Right     HYSTERECTOMY (CERVIX STATUS UNKNOWN)      TOTAL KNEE ARTHROPLASTY Right 3/19/2025    ROBOTIC RIGHT TOTAL KNEE ARTHROPLASTY performed by Chalino Caballero MD at [facility] OR    TUBAL LIGATION                Restrictions  Restrictions/Precautions  Restrictions/Precautions: Fall Risk, Weight Bearing  Lower Extremity Weight Bearing Restrictions  Right Lower Extremity Weight Bearing: Weight Bearing As Tolerated    Subjective      Pain Assessment  Pain Level: 6  Patient's Stated Pain Goal: 0 - No pain  Pain Location: Knee  Pain Orientation: Right  Pain Descriptors: Aching  Vital Signs  Temp: 98.4 °F (36.9 °C)  Temp Source: Temporal  Pulse: 77  Heart Rate Source: Monitor  Respirations: 16  BP:  137/67  MAP (Calculated): 90  MAP (mmHg): 89  BP Location: Right upper arm  Patient Position: Sitting  Oxygen Therapy  SpO2: 100 %  O2 Device: None (Room air)    Social/Functional History  Social/Functional History  Lives With: Spouse  Type of Home: House  Home Layout: One level  Home Access: Stairs to enter with rails  Entrance Stairs - Number of Steps: 3  Bathroom Shower/Tub: Tub/Shower unit  Bathroom Equipment: Shower chair  Home Equipment: Cane  Prior Level of Assist for Ambulation: Independent household ambulator, with or without device (cane)  Prior Level of Assist for Transfers: Independent  Active : Yes       Objective                 Toilet Transfers  Toilet - Technique: Ambulating  Equipment Used: Raised toilet seat with rails  Toilet Transfer: Minimal assistance  ADL  Feeding: Independent  Grooming: Independent  UE Bathing: Contact guard assistance  LE Bathing: Moderate assistance  UE Dressing: Contact guard assistance  LE Dressing: Moderate assistance  Putting On/Taking Off Footwear: Moderate assistance  Toileting: Moderate assistance  Functional Mobility: Contact guard assistance        Bed mobility  Supine to Sit: Partial/Moderate assistance  Sit to Supine: Unable to assess  Transfers  Stand Step Transfers: Contact guard assistance;Minimal assistance  Sit to stand: Minimal assistance  Stand to sit: Contact guard assistance     Cognition  Overall Cognitive Status: WFL               Gross Assessment  AROM: Within functional limits  Strength: Within functional limits  Coordination: Within functional limits                       Included Treatment  Tx consisted of: bed mobility; pt/family education; transfer training; DME training; activity tolerance/balance challenges; functional ambulation.   (Treatment time: 15 min)       Plan   Occupational Therapy Plan  Times Per Week: eval only       Goals  Short Term Goals  Short Term Goal 1: Transfer with CGA (GOAL MET).  Short Term Goal 2: Tolerate short,

## 2025-03-20 NOTE — PROGRESS NOTES
Physical Therapy  Facility/Department: [facility]  Physical Therapy Initial Assessment    Name: Val aHger  : 1945  MRN: [id number]  Date of Service: 3/20/2025    Discharge Recommendations:  Continue to assess pending progress, 24 hour supervision or assist, Therapy recommended at discharge          Patient Diagnosis(es): The encounter diagnosis was Primary osteoarthritis of right knee.  Past Medical History:  has a past medical history of Arrhythmia, CAD (coronary artery disease), Hyperlipidemia, Hypertension, Immune thrombocytopenic purpura (HCC), PONV (postoperative nausea and vomiting), Presence of pessary, Sleep apnea, Systemic lupus erythematosus (HCC), and Thrombocytopenia, unspecified.  Past Surgical History:  has a past surgical history that includes Hysterectomy; Breast surgery; Appendectomy; Tubal ligation; Foot surgery (Right); Colonoscopy; and Total knee arthroplasty (Right, 3/19/2025).    Assessment  Body Structures, Functions, Activity Limitations Requiring Skilled Therapeutic Intervention: Decreased functional mobility ;Decreased ADL status;Decreased ROM;Decreased strength;Decreased balance  Assessment: Pt. tolerated mobility well, but did have some nausea after amb. Anticipate she will progress well with mobility and be able to d/c home with family support. Pt. needs 24 hr supervision for 1st few days at home to ensure safe mobility. She states she is expecting RW to be delivered to hospital room prior to d/c, but she has a cane at home.  Treatment Diagnosis: impaired gait and mobility  Therapy Prognosis: Good  Decision Making: Low Complexity  Barriers to Learning: none noted  Requires PT Follow-Up: Yes  Activity Tolerance  Activity Tolerance: Patient tolerated treatment well  Activity Tolerance Comments: pt tolerated amb. well, but was nauseated afterwards    Plan  Physical Therapy Plan  General Plan: 6-7 times per week  Therapy Duration: 2 Weeks  Current Treatment      Education  Patient Education  Education Given To: Patient;Family  Education Provided: Role of Therapy;Plan of Care;Transfer Training;Equipment;Fall Prevention Strategies;Mobility Training  Education Provided Comments: pt instructed in use of RW, needs reinforcement; FWBAT RLE, no pillow under knee  Education Method: Demonstration;Verbal  Barriers to Learning: Other (Comment) (pt nauseated with amb.)  Education Outcome: Continued education needed      Therapy Time   Individual Concurrent Group Co-treatment   Time In           Time Out           Minutes                   Kareen Davis PT     Electronically signed by Kareen Davis PT on 3/20/2025 at 9:29 AM

## 2025-03-20 NOTE — PROGRESS NOTES
CLINICAL PHARMACY NOTE: MEDS TO BEDS    Total # of Prescriptions Filled: 3   The following medications were delivered to the patient:  Current Discharge Medication List        START taking these medications    Details   HYDROcodone-acetaminophen (NORCO) 5-325 MG per tablet Take 1 tablet by mouth every 4 hours as needed for Pain for up to 70 days. Intended supply: 3 days. Take lowest dose possible to manage pain Max Daily Amount: 6 tablets  Qty: 18 tablet, Refills: 0    Comments: Reduce doses taken as pain becomes manageable  Associated Diagnoses: Primary osteoarthritis of right knee      promethazine (PHENERGAN) 12.5 MG tablet Take 1 tablet by mouth every 6 hours as needed for Nausea  Qty: 30 tablet, Refills: 0      aspirin 81 MG EC tablet Take 1 tablet by mouth 2 times daily  Qty: 84 tablet, Refills: 0               Additional Documentation:  Delivered to patients family bedside. Paid with cash.

## 2025-03-21 LAB
ANION GAP SERPL CALCULATED.3IONS-SCNC: 8 MMOL/L (ref 8–16)
BUN SERPL-MCNC: 16 MG/DL (ref 8–23)
CALCIUM SERPL-MCNC: 8.4 MG/DL (ref 8.8–10.2)
CHLORIDE SERPL-SCNC: 97 MMOL/L (ref 98–107)
CO2 SERPL-SCNC: 21 MMOL/L (ref 22–29)
CREAT SERPL-MCNC: 0.6 MG/DL (ref 0.5–0.9)
ERYTHROCYTE [DISTWIDTH] IN BLOOD BY AUTOMATED COUNT: 12.4 % (ref 11.5–14.5)
GLUCOSE SERPL-MCNC: 105 MG/DL (ref 70–99)
HCT VFR BLD AUTO: 26.8 % (ref 37–47)
HGB BLD-MCNC: 8.9 G/DL (ref 12–16)
MCH RBC QN AUTO: 30.5 PG (ref 27–31)
MCHC RBC AUTO-ENTMCNC: 33.2 G/DL (ref 33–37)
MCV RBC AUTO: 91.8 FL (ref 81–99)
OSMOLALITY URINE: 289 MOSM/KG (ref 250–1200)
PLATELET # BLD AUTO: 164 K/UL (ref 130–400)
PMV BLD AUTO: 10.1 FL (ref 9.4–12.3)
POTASSIUM SERPL-SCNC: 4 MMOL/L (ref 3.5–5)
RBC # BLD AUTO: 2.92 M/UL (ref 4.2–5.4)
SODIUM SERPL-SCNC: 124 MMOL/L (ref 136–145)
SODIUM SERPL-SCNC: 126 MMOL/L (ref 136–145)
SODIUM UR-SCNC: 67 MMOL/L
WBC # BLD AUTO: 6.8 K/UL (ref 4.8–10.8)

## 2025-03-21 PROCEDURE — 36415 COLL VENOUS BLD VENIPUNCTURE: CPT

## 2025-03-21 PROCEDURE — 94150 VITAL CAPACITY TEST: CPT

## 2025-03-21 PROCEDURE — 83935 ASSAY OF URINE OSMOLALITY: CPT

## 2025-03-21 PROCEDURE — G0378 HOSPITAL OBSERVATION PER HR: HCPCS

## 2025-03-21 PROCEDURE — 96361 HYDRATE IV INFUSION ADD-ON: CPT

## 2025-03-21 PROCEDURE — 80048 BASIC METABOLIC PNL TOTAL CA: CPT

## 2025-03-21 PROCEDURE — 6360000002 HC RX W HCPCS: Performed by: ORTHOPAEDIC SURGERY

## 2025-03-21 PROCEDURE — 84300 ASSAY OF URINE SODIUM: CPT

## 2025-03-21 PROCEDURE — 84295 ASSAY OF SERUM SODIUM: CPT

## 2025-03-21 PROCEDURE — 94760 N-INVAS EAR/PLS OXIMETRY 1: CPT

## 2025-03-21 PROCEDURE — 6370000000 HC RX 637 (ALT 250 FOR IP)

## 2025-03-21 PROCEDURE — 6370000000 HC RX 637 (ALT 250 FOR IP): Performed by: ORTHOPAEDIC SURGERY

## 2025-03-21 PROCEDURE — 2500000003 HC RX 250 WO HCPCS: Performed by: ORTHOPAEDIC SURGERY

## 2025-03-21 PROCEDURE — 85027 COMPLETE CBC AUTOMATED: CPT

## 2025-03-21 PROCEDURE — 6370000000 HC RX 637 (ALT 250 FOR IP): Performed by: FAMILY MEDICINE

## 2025-03-21 PROCEDURE — 96376 TX/PRO/DX INJ SAME DRUG ADON: CPT

## 2025-03-21 PROCEDURE — 2580000003 HC RX 258

## 2025-03-21 PROCEDURE — 99024 POSTOP FOLLOW-UP VISIT: CPT

## 2025-03-21 PROCEDURE — 97116 GAIT TRAINING THERAPY: CPT

## 2025-03-21 RX ORDER — LISINOPRIL 20 MG/1
40 TABLET ORAL DAILY
Status: DISCONTINUED | OUTPATIENT
Start: 2025-03-21 | End: 2025-03-22 | Stop reason: HOSPADM

## 2025-03-21 RX ORDER — SODIUM CHLORIDE 9 MG/ML
INJECTION, SOLUTION INTRAVENOUS CONTINUOUS
Status: DISCONTINUED | OUTPATIENT
Start: 2025-03-21 | End: 2025-03-21

## 2025-03-21 RX ORDER — AMLODIPINE BESYLATE 5 MG/1
5 TABLET ORAL DAILY
Status: DISCONTINUED | OUTPATIENT
Start: 2025-03-21 | End: 2025-03-22 | Stop reason: HOSPADM

## 2025-03-21 RX ADMIN — LISINOPRIL 40 MG: 20 TABLET ORAL at 07:31

## 2025-03-21 RX ADMIN — PROCHLORPERAZINE EDISYLATE 10 MG: 5 INJECTION INTRAMUSCULAR; INTRAVENOUS at 07:34

## 2025-03-21 RX ADMIN — WATER 1000 MG: 1 INJECTION INTRAMUSCULAR; INTRAVENOUS; SUBCUTANEOUS at 00:50

## 2025-03-21 RX ADMIN — BISACODYL 5 MG: 5 TABLET, COATED ORAL at 07:31

## 2025-03-21 RX ADMIN — WATER 1000 MG: 1 INJECTION INTRAMUSCULAR; INTRAVENOUS; SUBCUTANEOUS at 07:31

## 2025-03-21 RX ADMIN — FERROUS SULFATE TAB 325 MG (65 MG ELEMENTAL FE) 325 MG: 325 (65 FE) TAB at 20:34

## 2025-03-21 RX ADMIN — HYDROMORPHONE HYDROCHLORIDE 2 MG: 2 TABLET ORAL at 20:34

## 2025-03-21 RX ADMIN — Medication 1000 UNITS: at 07:31

## 2025-03-21 RX ADMIN — SODIUM CHLORIDE, PRESERVATIVE FREE 5 ML: 5 INJECTION INTRAVENOUS at 07:32

## 2025-03-21 RX ADMIN — METOPROLOL SUCCINATE 50 MG: 50 TABLET, EXTENDED RELEASE ORAL at 07:31

## 2025-03-21 RX ADMIN — AMLODIPINE BESYLATE 5 MG: 5 TABLET ORAL at 07:31

## 2025-03-21 RX ADMIN — ACETAMINOPHEN 650 MG: 325 TABLET ORAL at 20:34

## 2025-03-21 RX ADMIN — ACETAMINOPHEN 650 MG: 325 TABLET ORAL at 00:50

## 2025-03-21 RX ADMIN — ACETAMINOPHEN 650 MG: 325 TABLET ORAL at 07:32

## 2025-03-21 RX ADMIN — SODIUM CHLORIDE: 0.9 INJECTION, SOLUTION INTRAVENOUS at 07:30

## 2025-03-21 RX ADMIN — HYDROMORPHONE HYDROCHLORIDE 2 MG: 2 TABLET ORAL at 07:32

## 2025-03-21 RX ADMIN — ACETAMINOPHEN 650 MG: 325 TABLET ORAL at 13:29

## 2025-03-21 RX ADMIN — FERROUS SULFATE TAB 325 MG (65 MG ELEMENTAL FE) 325 MG: 325 (65 FE) TAB at 07:32

## 2025-03-21 RX ADMIN — POLYETHYLENE GLYCOL 3350 17 G: 17 POWDER, FOR SOLUTION ORAL at 20:35

## 2025-03-21 RX ADMIN — SODIUM CHLORIDE, PRESERVATIVE FREE 10 ML: 5 INJECTION INTRAVENOUS at 20:53

## 2025-03-21 RX ADMIN — ASPIRIN 81 MG: 81 TABLET, COATED ORAL at 20:34

## 2025-03-21 RX ADMIN — HYDROMORPHONE HYDROCHLORIDE 2 MG: 2 TABLET ORAL at 03:23

## 2025-03-21 RX ADMIN — ASPIRIN 81 MG: 81 TABLET, COATED ORAL at 07:31

## 2025-03-21 ASSESSMENT — PAIN SCALES - GENERAL
PAINLEVEL_OUTOF10: 6
PAINLEVEL_OUTOF10: 7
PAINLEVEL_OUTOF10: 7
PAINLEVEL_OUTOF10: 9
PAINLEVEL_OUTOF10: 6
PAINLEVEL_OUTOF10: 1
PAINLEVEL_OUTOF10: 6

## 2025-03-21 ASSESSMENT — PAIN - FUNCTIONAL ASSESSMENT
PAIN_FUNCTIONAL_ASSESSMENT: PREVENTS OR INTERFERES SOME ACTIVE ACTIVITIES AND ADLS
PAIN_FUNCTIONAL_ASSESSMENT: ACTIVITIES ARE NOT PREVENTED
PAIN_FUNCTIONAL_ASSESSMENT: ACTIVITIES ARE NOT PREVENTED
PAIN_FUNCTIONAL_ASSESSMENT: PREVENTS OR INTERFERES SOME ACTIVE ACTIVITIES AND ADLS

## 2025-03-21 ASSESSMENT — PAIN DESCRIPTION - DESCRIPTORS
DESCRIPTORS: ACHING
DESCRIPTORS: ACHING
DESCRIPTORS: ACHING;DISCOMFORT
DESCRIPTORS: ACHING
DESCRIPTORS: ACHING
DESCRIPTORS: SORE

## 2025-03-21 ASSESSMENT — PAIN DESCRIPTION - ORIENTATION
ORIENTATION: RIGHT

## 2025-03-21 ASSESSMENT — PAIN DESCRIPTION - PAIN TYPE
TYPE: ACUTE PAIN
TYPE: ACUTE PAIN

## 2025-03-21 ASSESSMENT — PAIN DESCRIPTION - LOCATION
LOCATION: KNEE

## 2025-03-21 ASSESSMENT — PAIN DESCRIPTION - FREQUENCY
FREQUENCY: CONTINUOUS
FREQUENCY: CONTINUOUS

## 2025-03-21 NOTE — DISCHARGE INSTR - DIET
Good nutrition is important when healing from an illness, injury, or surgery.  Follow any nutrition recommendations given to you during your hospital stay.   If you were given an oral nutrition supplement while in the hospital, continue to take this supplement at home.  You can take it with meals, in-between meals, and/or before bedtime. These supplements can be purchased at most local grocery stores, pharmacies, and chain [employer]-stores.   If you have any questions about your diet or nutrition, call the hospital and ask for the dietitian.            Resume home diet

## 2025-03-21 NOTE — PROGRESS NOTES
Physical Therapy  Name: Val Hager  MRN:  [id number]  Date of service:  3/21/2025     03/21/25 1339   Restrictions/Precautions   Restrictions/Precautions Fall Risk;Weight Bearing   Lower Extremity Weight Bearing Restrictions   Right Lower Extremity Weight Bearing Weight Bearing As Tolerated   Subjective   Subjective Pt agreed to therapy.   Pain Assessment   Pain Assessment 0-10   Pain Level 6   Pain Location Knee   Pain Orientation Right   Pain Descriptors Aching   Functional Pain Assessment Prevents or interferes some active activities and ADLs   Pain Type Acute pain   Pain Frequency Continuous   Non-Pharmaceutical Pain Intervention(s) Ambulation/Increased Activity;Cold pack;Repositioned;Rest   Response to Pain Intervention Patient satisfied   Bed Mobility   Supine to Sit Stand by assistance   Sit to Supine Stand by assistance   Transfers   Sit to Stand Contact guard assistance   Stand to Sit Contact guard assistance   Ambulation   WB Status FWBAT RLE   Ambulation   Surface Level tile   Device Rolling Walker   Assistance Contact guard assistance   Quality of Gait steady overall   Gait Deviations Slow Jennifer;Decreased step length;Decreased step height   Distance 50'   Patient Goals    Patient Goals  go home   Short Term Goals   Time Frame for Short Term Goals 2 wks   Short Term Goal 1 supine to sit indep   Short Term Goal 2 sit to stand indep   Short Term Goal 3 amb. 100' with RW SBA   Short Term Goal 4 up/down 3 stairs with rail CGA   Conditions Requiring Skilled Therapeutic Intervention   Body Structures, Functions, Activity Limitations Requiring Skilled Therapeutic Intervention Decreased functional mobility ;Decreased ADL status;Decreased ROM;Decreased strength;Decreased balance   Assessment Pt continues to progress well with amb distance. Steady with gait having no LOB. Able to sufficiently weight bear on RLE. Assisted back to bed with all needs in reach.   Activity Tolerance   Activity Tolerance Patient

## 2025-03-21 NOTE — PROGRESS NOTES
Subjective:     Post-Operative Day: 2 Status Post right TKA  Systemic or Specific Complaints:No Complaints  no nausea Pain 2    Patient sitting up in chair eating breakfast. Patient expressed that she feels ready to home. Low Na overnight and receiving NS bolus and recheck Na level at noon. Hope for DC with increased Na level.     Objective:     Patient Vitals for the past 24 hrs:   BP Temp Temp src Pulse Resp SpO2   03/21/25 0844 (!) 106/48 99.9 °F (37.7 °C) Temporal 71 18 99 %   03/21/25 0615 (!) 160/64 -- -- -- -- --   03/21/25 0321 (!) 172/65 98.4 °F (36.9 °C) -- 76 16 98 %   03/21/25 0012 -- -- -- -- 16 --   03/21/25 0010 (!) 146/63 -- -- 68 -- --   03/20/25 2343 (!) 172/73 -- -- 89 -- --   03/20/25 2323 -- 98.6 °F (37 °C) -- -- 18 100 %   03/20/25 2019 -- -- -- -- 16 --   03/20/25 1936 (!) 147/68 98.1 °F (36.7 °C) -- 73 16 98 %   03/20/25 1621 (!) 160/61 98.1 °F (36.7 °C) Temporal 69 12 98 %   03/20/25 1052 (!) 116/59 98.2 °F (36.8 °C) Temporal 59 16 99 %   03/20/25 1049 -- -- -- -- -- 99 %       General: alert, appears stated age, and cooperative   Exam: Incision clean, dry, and intact, no evidence of infection.   Neurovascular: Exam normal. Cap refill <2 seconds in all extremities.      Data Review:  Recent Labs     03/20/25 0427 03/21/25 0404   HGB 8.1* 8.9*     Recent Labs     03/21/25 0404   *   K 4.0   CREATININE 0.6     Recent Labs     03/21/25 0404   LABGLOM >90           Assessment:     Status Post right TKA. Doing well postoperatively.     Plan:     Continues current post-op course  Plan to DC to home today, pending Na level stabilizes      Electronically signed by BETTINA Boles - CNP on 3/21/2025 at 8:45 AM

## 2025-03-21 NOTE — DISCHARGE INSTRUCTIONS
Dr Caballero Total Knee Replacement Discharge Instructions     *Elevate legs at all times when not walking  * Use Ice Pack to affected extremity as needed for swelling and pain     * HOMEWORK          Be able to fully straighten leg by post-op appointment.  This is the most important thing to work on!!!          Use the ankle pillow or a towel roll under the ankle to work on straightening          You may work on bending the knee also    *Surgical Site Care:         The adhesive (glue strip) dressing can be removed in 3 weeks.         May shower on Friday and clean wound but do not scrub incision. Pat dry.          NO tub bathing or swimming.         Wash hands frequently during the day.     *Activity:      SAFETY FIRST ! WALK WITH A WALKER !!!                 Home Health therapy will come to the house three times a week:                    Get in and out of your bed                                                                                           Getting up and down out of the chair                                                                   Bathroom  / bathing activities                                                                                Do NOT walk for exercise ( it will increase pain and swelling )                            Walk several times a day but only for short distances             *Pain Medicines:          Keep a LOG of your medicines to track when pain med is due          Take prescribed medicine as needed for pain          Take Tylenol as your pain decreases          Take a stool softener of your choice while taking pain medications as they are very constipating ( examples:  colace  dulcolax  fiber   prune juice )      *To prevent blood clots:          Take prescribed blood thinner as directed.  (Aspirin 81 mg two times daily for 6 weeks)          Do ankle pump exercises when sitting in the chair           *To Prevent Pneumonia:           Sit up and take deep breaths several  times a day and use the incentive spirometer     *Call the office if:           Increased redness, any drainage, severe pain, new onset fever or chills.            Also notify of any calf pain, tenderness, swelling or redness.           Any new rash, nausea or vomiting     **If you have any questions or problems please call our office at 1 [phone] option 3**  Please use My Chart for pain medication refills   or contact Ortho Navigator (Rosa) at 1 [phone].  Thank you

## 2025-03-21 NOTE — DISCHARGE SUMMARY
[facility]  Discharge Summary    Val Hager is a 80 y.o. female underwent right TKA procedure without complication.  Val Hager was admitted to the floor following her   recovery in the PACU.     Discharge Diagnosis  right TKA    Current Inpatient Medications    Current Facility-Administered Medications: amLODIPine (NORVASC) tablet 5 mg, 5 mg, Oral, Daily  0.9 % sodium chloride infusion, , IntraVENous, Continuous  lisinopril (PRINIVIL;ZESTRIL) tablet 40 mg, 40 mg, Oral, Daily  calcium carbonate (TUMS) chewable tablet 500 mg, 500 mg, Oral, TID PRN  metoprolol succinate (TOPROL XL) extended release tablet 50 mg, 50 mg, Oral, Daily  ferrous sulfate (IRON 325) tablet 325 mg, 325 mg, Oral, BID  Vitamin D (CHOLECALCIFEROL) tablet 1,000 Units, 1,000 Units, Oral, Daily  0.9 % sodium chloride infusion, , IntraVENous, Continuous  sodium chloride 0.9 % bolus 500 mL, 500 mL, IntraVENous, PRN  sodium chloride flush 0.9 % injection 5-40 mL, 5-40 mL, IntraVENous, 2 times per day  sodium chloride flush 0.9 % injection 5-40 mL, 5-40 mL, IntraVENous, PRN  0.9 % sodium chloride infusion, , IntraVENous, PRN  acetaminophen (TYLENOL) tablet 650 mg, 650 mg, Oral, Q6H  bisacodyl (DULCOLAX) EC tablet 5 mg, 5 mg, Oral, Daily  diphenhydrAMINE (BENADRYL) tablet 25 mg, 25 mg, Oral, Q6H PRN **OR** diphenhydrAMINE (BENADRYL) injection 25 mg, 25 mg, IntraVENous, Q6H PRN  cyclobenzaprine (FLEXERIL) tablet 10 mg, 10 mg, Oral, Q12H PRN  prochlorperazine (COMPAZINE) injection 10 mg, 10 mg, IntraVENous, Q6H PRN  aspirin EC tablet 81 mg, 81 mg, Oral, BID  HYDROmorphone (DILAUDID) tablet 2 mg, 2 mg, Oral, Q3H PRN  HYDROmorphone HCl PF (DILAUDID) injection 0.25 mg, 0.25 mg, IntraVENous, Q3H PRN **OR** HYDROmorphone HCl PF (DILAUDID) injection 0.5 mg, 0.5 mg, IntraVENous, Q3H PRN **OR** HYDROmorphone HCl PF (DILAUDID) injection 1 mg, 1 mg, IntraVENous, Q3H PRN  HYDROmorphone (DILAUDID) tablet 4 mg, 4 mg,  Oral, Q3H PRN  simethicone (MYLICON) chewable tablet 80 mg, 80 mg, Oral, Q6H PRN  polyethylene glycol (GLYCOLAX) packet 17 g, 17 g, Oral, BID    Post-operatively the patient’s diet was advanced as tolerated and their incision was checked on POD #1.  The incision is dressing in place, clean, dry, and intact with no signs of infection.  The patient remained neurovascularly intact in the lower extremity and had intact pulses distally.  Patient’s calf remained soft and showed no evidence of DVT.  The patient was able to move their right leg without any problems post-operatively.  Physical therapy and occupational therapy were consulted and began working with the patient post-operatively.  The patient progressed with PT/OT as would be expected and continued to improve through their stay.  The patients pain was initially controlled with IV medications but we were able to transition to oral pain medications soon after arrival to the floor and their pain remained under good control through their hospital stay.  From a medical standpoint the patient remained stable and continued to have the medicine team follow throughout their stay.  The patients dressing was changed/incison was checked on day of d/c.  The patient will be discharged at this time to Home  with their current diet restrictions and will continue to follow the total knee precautions outlined to them by us and PT/OT.     Condition on Discharge: Stable    Plan  Return visit in 6 weeks..  Patient was instructed on the use of pain medications, the signs and symptoms of infection, and was given our number to call should they have any questions or concerns following discharge.

## 2025-03-21 NOTE — SIGNIFICANT EVENT
Labs and clinical response regarding sodium look like SIADH. Have placed orders for strict I&Os and fluid restriction of 1200mL. Will re-check urine studies in the morning and repeat bmp.

## 2025-03-21 NOTE — PROGRESS NOTES
Physical Therapy  Name: Val Hager  MRN:  [id number]  Date of service:  3/21/2025     03/21/25 0846   Restrictions/Precautions   Restrictions/Precautions Fall Risk;Weight Bearing   Lower Extremity Weight Bearing Restrictions   Right Lower Extremity Weight Bearing Weight Bearing As Tolerated   Subjective   Subjective Pt agreed to therapy. Nurse reports pt had pain meds recently.   Pain Assessment   Pain Assessment 0-10   Pain Level 6   Pain Location Knee   Pain Orientation Right   Pain Descriptors Sore   Functional Pain Assessment Prevents or interferes some active activities and ADLs   Pain Type Acute pain   Pain Frequency Continuous   Non-Pharmaceutical Pain Intervention(s) Ambulation/Increased Activity;Cold pack;Repositioned;Rest   Response to Pain Intervention Patient satisfied   Transfers   Sit to Stand Contact guard assistance   Stand to Sit Contact guard assistance   Ambulation   WB Status FWBAT RLE   Ambulation   Surface Level tile   Device Rolling Walker   Assistance Contact guard assistance   Quality of Gait steady overall   Gait Deviations Slow Jennifer;Decreased step length;Decreased step height   Distance 40'   Patient Goals    Patient Goals  go home   Short Term Goals   Time Frame for Short Term Goals 2 wks   Short Term Goal 1 supine to sit indep   Short Term Goal 2 sit to stand indep   Short Term Goal 3 amb. 100' with RW SBA   Short Term Goal 4 up/down 3 stairs with rail CGA   Conditions Requiring Skilled Therapeutic Intervention   Body Structures, Functions, Activity Limitations Requiring Skilled Therapeutic Intervention Decreased functional mobility ;Decreased ADL status;Decreased ROM;Decreased strength;Decreased balance   Assessment Pt demonstrates steady gait this date with no LOB. Tolerated mobility well, reporting soreness but managable for mobility. Assisted back to chair with all needs in reach.   Activity Tolerance   Activity Tolerance Patient tolerated treatment well   PT Plan of Care

## 2025-03-22 VITALS
TEMPERATURE: 99.3 F | DIASTOLIC BLOOD PRESSURE: 81 MMHG | HEART RATE: 92 BPM | RESPIRATION RATE: 14 BRPM | BODY MASS INDEX: 18.46 KG/M2 | WEIGHT: 94 LBS | HEIGHT: 60 IN | SYSTOLIC BLOOD PRESSURE: 139 MMHG | OXYGEN SATURATION: 100 %

## 2025-03-22 LAB
ANION GAP SERPL CALCULATED.3IONS-SCNC: 8 MMOL/L (ref 8–16)
BUN SERPL-MCNC: 9 MG/DL (ref 8–23)
CALCIUM SERPL-MCNC: 8.7 MG/DL (ref 8.8–10.2)
CHLORIDE SERPL-SCNC: 99 MMOL/L (ref 98–107)
CO2 SERPL-SCNC: 23 MMOL/L (ref 22–29)
CREAT SERPL-MCNC: 0.6 MG/DL (ref 0.5–0.9)
ERYTHROCYTE [DISTWIDTH] IN BLOOD BY AUTOMATED COUNT: 12.2 % (ref 11.5–14.5)
GLUCOSE SERPL-MCNC: 93 MG/DL (ref 70–99)
HCT VFR BLD AUTO: 26.2 % (ref 37–47)
HGB BLD-MCNC: 8.5 G/DL (ref 12–16)
MAGNESIUM SERPL-MCNC: 1.9 MG/DL (ref 1.6–2.4)
MCH RBC QN AUTO: 30.1 PG (ref 27–31)
MCHC RBC AUTO-ENTMCNC: 32.4 G/DL (ref 33–37)
MCV RBC AUTO: 92.9 FL (ref 81–99)
OSMOLALITY URINE: 132 MOSM/KG (ref 250–1200)
PLATELET # BLD AUTO: 147 K/UL (ref 130–400)
PMV BLD AUTO: 9.8 FL (ref 9.4–12.3)
POTASSIUM SERPL-SCNC: 3.5 MMOL/L (ref 3.5–5)
RBC # BLD AUTO: 2.82 M/UL (ref 4.2–5.4)
SODIUM SERPL-SCNC: 130 MMOL/L (ref 136–145)
SODIUM UR-SCNC: 29 MMOL/L
WBC # BLD AUTO: 5.9 K/UL (ref 4.8–10.8)

## 2025-03-22 PROCEDURE — G0378 HOSPITAL OBSERVATION PER HR: HCPCS

## 2025-03-22 PROCEDURE — 6370000000 HC RX 637 (ALT 250 FOR IP): Performed by: FAMILY MEDICINE

## 2025-03-22 PROCEDURE — 83935 ASSAY OF URINE OSMOLALITY: CPT

## 2025-03-22 PROCEDURE — 6370000000 HC RX 637 (ALT 250 FOR IP): Performed by: ORTHOPAEDIC SURGERY

## 2025-03-22 PROCEDURE — 84300 ASSAY OF URINE SODIUM: CPT

## 2025-03-22 PROCEDURE — 36415 COLL VENOUS BLD VENIPUNCTURE: CPT

## 2025-03-22 PROCEDURE — 85027 COMPLETE CBC AUTOMATED: CPT

## 2025-03-22 PROCEDURE — 6370000000 HC RX 637 (ALT 250 FOR IP)

## 2025-03-22 PROCEDURE — 83735 ASSAY OF MAGNESIUM: CPT

## 2025-03-22 PROCEDURE — 80048 BASIC METABOLIC PNL TOTAL CA: CPT

## 2025-03-22 PROCEDURE — 2500000003 HC RX 250 WO HCPCS: Performed by: ORTHOPAEDIC SURGERY

## 2025-03-22 PROCEDURE — 94150 VITAL CAPACITY TEST: CPT

## 2025-03-22 PROCEDURE — 97530 THERAPEUTIC ACTIVITIES: CPT

## 2025-03-22 PROCEDURE — 94760 N-INVAS EAR/PLS OXIMETRY 1: CPT

## 2025-03-22 RX ADMIN — AMLODIPINE BESYLATE 5 MG: 5 TABLET ORAL at 08:36

## 2025-03-22 RX ADMIN — ACETAMINOPHEN 650 MG: 325 TABLET ORAL at 13:04

## 2025-03-22 RX ADMIN — ASPIRIN 81 MG: 81 TABLET, COATED ORAL at 08:36

## 2025-03-22 RX ADMIN — METOPROLOL SUCCINATE 50 MG: 50 TABLET, EXTENDED RELEASE ORAL at 08:36

## 2025-03-22 RX ADMIN — Medication 1000 UNITS: at 08:35

## 2025-03-22 RX ADMIN — ACETAMINOPHEN 650 MG: 325 TABLET ORAL at 04:38

## 2025-03-22 RX ADMIN — HYDROMORPHONE HYDROCHLORIDE 2 MG: 2 TABLET ORAL at 04:38

## 2025-03-22 RX ADMIN — LISINOPRIL 40 MG: 20 TABLET ORAL at 08:36

## 2025-03-22 RX ADMIN — HYDROMORPHONE HYDROCHLORIDE 2 MG: 2 TABLET ORAL at 11:57

## 2025-03-22 RX ADMIN — HYDROMORPHONE HYDROCHLORIDE 2 MG: 2 TABLET ORAL at 00:16

## 2025-03-22 RX ADMIN — BISACODYL 5 MG: 5 TABLET, COATED ORAL at 08:36

## 2025-03-22 RX ADMIN — CYCLOBENZAPRINE 10 MG: 10 TABLET, FILM COATED ORAL at 08:36

## 2025-03-22 RX ADMIN — SODIUM CHLORIDE, PRESERVATIVE FREE 10 ML: 5 INJECTION INTRAVENOUS at 08:44

## 2025-03-22 RX ADMIN — FERROUS SULFATE TAB 325 MG (65 MG ELEMENTAL FE) 325 MG: 325 (65 FE) TAB at 08:36

## 2025-03-22 RX ADMIN — POLYETHYLENE GLYCOL 3350 17 G: 17 POWDER, FOR SOLUTION ORAL at 08:38

## 2025-03-22 ASSESSMENT — PAIN DESCRIPTION - DESCRIPTORS
DESCRIPTORS: ACHING
DESCRIPTORS: ACHING;DISCOMFORT;SORE
DESCRIPTORS: ACHING;DISCOMFORT
DESCRIPTORS: ACHING

## 2025-03-22 ASSESSMENT — PAIN SCALES - GENERAL
PAINLEVEL_OUTOF10: 8
PAINLEVEL_OUTOF10: 7
PAINLEVEL_OUTOF10: 6
PAINLEVEL_OUTOF10: 4

## 2025-03-22 ASSESSMENT — PAIN DESCRIPTION - LOCATION
LOCATION: KNEE

## 2025-03-22 ASSESSMENT — PAIN DESCRIPTION - PAIN TYPE
TYPE: ACUTE PAIN;SURGICAL PAIN
TYPE: ACUTE PAIN;SURGICAL PAIN

## 2025-03-22 ASSESSMENT — PAIN DESCRIPTION - FREQUENCY: FREQUENCY: CONTINUOUS

## 2025-03-22 ASSESSMENT — PAIN DESCRIPTION - ORIENTATION
ORIENTATION: RIGHT

## 2025-03-22 NOTE — PROGRESS NOTES
Subjective:     Post-Operative Day: 3  Status Post right TKA  Systemic or Specific Complaints:No Complaints  no nausea Pain 2    Feeling better today wants to go home    Objective:     Patient Vitals for the past 24 hrs:   BP Temp Temp src Pulse Resp SpO2   03/22/25 1253 139/81 99.3 °F (37.4 °C) Temporal (!) 124 14 100 %   03/22/25 0819 (!) 165/82 99 °F (37.2 °C) Temporal (!) 112 16 100 %   03/22/25 0444 (!) 152/67 97.9 °F (36.6 °C) Oral 81 16 100 %   03/21/25 2359 (!) 159/67 97.5 °F (36.4 °C) -- 85 18 100 %   03/21/25 1959 (!) 157/65 98.8 °F (37.1 °C) Oral 81 16 98 %   03/21/25 1731 (!) 150/65 99.3 °F (37.4 °C) Temporal 86 16 100 %       General: alert, appears stated age, and cooperative   Exam: Incision clean, dry, and intact, no evidence of infection.   Neurovascular: Exam normal. Cap refill <2 seconds in all extremities.      Data Review:  Recent Labs     03/21/25  0404 03/22/25  0422   HGB 8.9* 8.5*     Recent Labs     03/22/25  0422   *   K 3.5   CREATININE 0.6     Recent Labs     03/22/25  0422   LABGLOM >90           Assessment:     Status Post right TKA. Doing well postoperatively.     Plan:     Continues current post-op course  Plan to DC to home today, pending Na level stabilizes      Electronically signed by Chalino Caballero MD on 3/22/2025 at 2:04 PM

## 2025-03-22 NOTE — PROGRESS NOTES
Physical Therapy  Name: Val Hager  MRN:  [id number]  Date of service:  3/22/2025       03/22/25 0859   Restrictions/Precautions   Restrictions/Precautions Fall Risk;Weight Bearing   Lower Extremity Weight Bearing Restrictions   Right Lower Extremity Weight Bearing Weight Bearing As Tolerated   General   Chart Reviewed Yes   Family/Caregiver Present Yes   Referring Practitioner Chalino Caballero MD   Subjective   Subjective pt in bed, agrees to therapy   Pain Assessment   Pain Assessment 0-10   Pain Level 4   Pain Location Knee   Pain Orientation Right   Pain Descriptors Aching;Discomfort;Sore   Pain Type Acute pain;Surgical pain   Oxygen Therapy   O2 Device None (Room air)   Orientation   Overall Orientation Status WFL   Bed Mobility   Supine to Sit Stand by assistance   Transfers   Sit to Stand Contact guard assistance;Stand by assistance   Stand to Sit Contact guard assistance   Bed to Chair Contact guard assistance   Comment cues for safety with tfers   Ambulation   WB Status FWBAT RLE   Ambulation   Surface Level tile   Device Rolling Walker   Assistance Contact guard assistance   Quality of Gait steady with mod foot clear   Distance 55'   Other Activities   Comment educated pt and cg on towel roll under heel for knee ext stretch and how to perform at home iwth pt and cg understanding   Patient Goals    Patient Goals  go home   Short Term Goals   Time Frame for Short Term Goals 2 wks   Short Term Goal 1 supine to sit indep   Short Term Goal 2 sit to stand indep   Short Term Goal 3 amb. 100' with RW SBA   Short Term Goal 4 up/down 3 stairs with rail CGA   Conditions Requiring Skilled Therapeutic Intervention   Body Structures, Functions, Activity Limitations Requiring Skilled Therapeutic Intervention Decreased functional mobility ;Decreased ADL status;Decreased ROM;Decreased strength;Decreased balance   Assessment pt tolerating tx well  pt amb in room,guzmán with fww, cga  pt with very slight antalgic gt  pattern  pt placed in chair with safety and comfort measures with pt and cg educated on no pillow under knee , towel roll under heel for knee ext , ice placed for comfort   Discharge Recommendations Continue to assess pending progress;24 hour supervision or assist;Therapy recommended at discharge   Activity Tolerance   Activity Tolerance Patient tolerated treatment well   Physical Therapy Plan   General Plan 6-7 times per week   Therapy Duration 2 Weeks   Current Treatment Recommendations Strengthening;Balance training;Functional mobility training;ROM;Transfer training;Gait training;Stair training;Patient/Caregiver education & training;Safety education & training;Equipment evaluation, education, & procurement;Therapeutic activities;Positioning   PT Plan of Care   Saturday X   Safety Devices   Type of Devices Call light within reach;Chair alarm in place;Gait belt;Left in chair;Nurse notified  (dtr present)   PT Whiteboard Notes   Therapy Whiteboard RE 4/3 R BARTOLO BARNES Patel         Electronically signed by Charlee Devine PTA on 3/22/2025 at 9:04 AM

## 2025-03-22 NOTE — PROGRESS NOTES
Physical Therapy  Name: Val Hager  MRN:  [id number]  Date of service:  3/22/2025       03/22/25 1317   Restrictions/Precautions   Restrictions/Precautions Fall Risk;Weight Bearing   Lower Extremity Weight Bearing Restrictions   Right Lower Extremity Weight Bearing Weight Bearing As Tolerated   General   Chart Reviewed Yes   Family/Caregiver Present Yes   Referring Practitioner Chalino Caballero MD   Subjective   Subjective pt leaving BR with nsg  pt agrees to therapy   General   General Comments Rn okayed therapy   Pain Assessment   Pain Assessment 0-10   Pain Level 6   Pain Location Knee   Pain Orientation Right   Pain Descriptors Aching;Discomfort   Pain Type Acute pain;Surgical pain   Pain Frequency Continuous   Oxygen Therapy   O2 Device None (Room air)   Orientation   Overall Orientation Status WFL   Bed Mobility   Sit to Supine Stand by assistance   Transfers   Sit to Stand Contact guard assistance;Stand by assistance   Stand to Sit Contact guard assistance;Stand by assistance   Ambulation   WB Status FWBAT RLE   Ambulation   Surface Level tile   Device Rolling Walker   Assistance Contact guard assistance   Quality of Gait steady with mod foot clear   Gait Deviations Slow Jennifer;Decreased step length;Decreased step height   Distance 50'   Stairs/Curb   Stairs? Yes   Stairs   # Steps  4   Stairs Height 6\"   Rails Bilateral   Comment education to pt and dtr regrading technique and safety with steps   Patient Goals    Patient Goals  go home   Short Term Goals   Time Frame for Short Term Goals 2 wks   Short Term Goal 1 supine to sit indep   Short Term Goal 2 sit to stand indep   Short Term Goal 3 amb. 100' with RW SBA   Short Term Goal 4 up/down 3 stairs with rail CGA   Conditions Requiring Skilled Therapeutic Intervention   Body Structures, Functions, Activity Limitations Requiring Skilled Therapeutic Intervention Decreased functional mobility ;Decreased ADL status;Decreased ROM;Decreased strength;Decreased

## 2025-03-24 ENCOUNTER — TELEPHONE (OUTPATIENT)
Dept: INPATIENT UNIT | Age: 80
End: 2025-03-24

## 2025-03-24 ENCOUNTER — TELEPHONE (OUTPATIENT)
Age: 80
End: 2025-03-24

## 2025-03-24 NOTE — TELEPHONE ENCOUNTER
[facility] wanted clinic to know patient will be seen 3x a week for 3 weeks

## 2025-03-28 ENCOUNTER — TELEPHONE (OUTPATIENT)
Age: 80
End: 2025-03-28

## 2025-03-28 DIAGNOSIS — Z96.651 PRESENCE OF RIGHT ARTIFICIAL KNEE JOINT: Primary | ICD-10-CM

## 2025-03-28 RX ORDER — CYCLOBENZAPRINE HCL 10 MG
10 TABLET ORAL 3 TIMES DAILY PRN
Qty: 40 TABLET | Refills: 0 | Status: SHIPPED | OUTPATIENT
Start: 2025-03-28

## 2025-03-28 NOTE — TELEPHONE ENCOUNTER
Patient states her right leg is jerking and jumping. No Pain directly in the calf. Will task Flexeril to Dr. Caballero

## 2025-03-28 NOTE — TELEPHONE ENCOUNTER
Patient states she is having muscle spasms in her knee that she had her TKR on and she does use ice to help but it doesn't always help she was told to let [facility] know incase he wanted to send her a script in.    [place] [facility] is her preferred pharmacy

## 2025-04-04 LAB
ALBUMIN SERPL-MCNC: 4.3 G/DL (ref 3.5–5.2)
ALP SERPL-CCNC: 69 U/L (ref 35–104)
ALT SERPL-CCNC: 14 U/L (ref 10–35)
ANION GAP SERPL CALCULATED.3IONS-SCNC: 12 MMOL/L (ref 8–16)
AST SERPL-CCNC: 21 U/L (ref 10–35)
BILIRUB SERPL-MCNC: 0.2 MG/DL (ref 0.2–1.2)
BUN SERPL-MCNC: 23 MG/DL (ref 8–23)
CALCIUM SERPL-MCNC: 9.7 MG/DL (ref 8.8–10.2)
CHLORIDE SERPL-SCNC: 97 MMOL/L (ref 98–107)
CO2 SERPL-SCNC: 23 MMOL/L (ref 22–29)
CREAT SERPL-MCNC: 0.8 MG/DL (ref 0.5–0.9)
GLUCOSE SERPL-MCNC: 73 MG/DL (ref 70–99)
POTASSIUM SERPL-SCNC: 4.2 MMOL/L (ref 3.5–5.1)
PROT SERPL-MCNC: 6.6 G/DL (ref 6.4–8.3)
SODIUM SERPL-SCNC: 132 MMOL/L (ref 136–145)

## 2025-04-10 ENCOUNTER — TELEPHONE (OUTPATIENT)
Age: 80
End: 2025-04-10

## 2025-04-10 NOTE — TELEPHONE ENCOUNTER
Called spoke to Aron who advised interaction between [facility], advised caller I would speak to Dr Caballero.  Caller verbalized understanding.

## 2025-04-10 NOTE — TELEPHONE ENCOUNTER
Per Dr. Caballero that is okay   The patient is following up after her recent upper endoscopy.  This showed a small hiatal hernia and biopsies confirmed reflux disease without evidence of Victor's, but no ulcerations or significant erosions.  Since being on amitriptyline and new medication for ADHD, her chest pain and "reflux symptoms" are markedly improved.  She is not currently using any antacids.  She has no nausea, vomiting, dysphagia, weight loss, or hematemesis.  Her anxiety disease is well-controlled and she feels much Colmer than at her previous visit.

## 2025-04-11 ENCOUNTER — TELEPHONE (OUTPATIENT)
Age: 80
End: 2025-04-11

## 2025-04-18 ENCOUNTER — TELEPHONE (OUTPATIENT)
Age: 80
End: 2025-04-18

## 2025-04-29 ENCOUNTER — TELEPHONE (OUTPATIENT)
Dept: HEMATOLOGY | Age: 80
End: 2025-04-29

## 2025-04-29 NOTE — TELEPHONE ENCOUNTER

## 2025-05-01 ENCOUNTER — OFFICE VISIT (OUTPATIENT)
Age: 80
End: 2025-05-01

## 2025-05-01 VITALS — HEIGHT: 60 IN | BODY MASS INDEX: 18.46 KG/M2 | WEIGHT: 94 LBS

## 2025-05-01 DIAGNOSIS — Z96.651 PRESENCE OF RIGHT ARTIFICIAL KNEE JOINT: Primary | ICD-10-CM

## 2025-05-01 PROCEDURE — 99024 POSTOP FOLLOW-UP VISIT: CPT | Performed by: ORTHOPAEDIC SURGERY

## 2025-05-02 ENCOUNTER — HOSPITAL ENCOUNTER (OUTPATIENT)
Dept: INFUSION THERAPY | Age: 80
Discharge: HOME | End: 2025-05-02
Payer: MEDICARE

## 2025-05-02 ENCOUNTER — OFFICE VISIT (OUTPATIENT)
Dept: HEMATOLOGY | Age: 80
End: 2025-05-02
Payer: MEDICARE

## 2025-05-02 VITALS
DIASTOLIC BLOOD PRESSURE: 82 MMHG | HEIGHT: 60 IN | OXYGEN SATURATION: 100 % | HEART RATE: 71 BPM | TEMPERATURE: 97.7 F | SYSTOLIC BLOOD PRESSURE: 146 MMHG | BODY MASS INDEX: 18.94 KG/M2 | WEIGHT: 96.5 LBS

## 2025-05-02 DIAGNOSIS — D69.3 CHRONIC ITP (IDIOPATHIC THROMBOCYTOPENIC PURPURA) (HCC): Primary | ICD-10-CM

## 2025-05-02 DIAGNOSIS — Z79.899 MEDICATION MANAGEMENT: ICD-10-CM

## 2025-05-02 DIAGNOSIS — D50.8 IRON DEFICIENCY ANEMIA SECONDARY TO INADEQUATE DIETARY IRON INTAKE: ICD-10-CM

## 2025-05-02 DIAGNOSIS — D69.3 CHRONIC ITP (IDIOPATHIC THROMBOCYTOPENIC PURPURA) (HCC): ICD-10-CM

## 2025-05-02 LAB
ALBUMIN SERPL-MCNC: 4.2 G/DL (ref 3.5–5.2)
ALP SERPL-CCNC: 75 U/L (ref 35–104)
ALT SERPL-CCNC: 10 U/L (ref 5–33)
ANION GAP SERPL CALCULATED.3IONS-SCNC: 10 MMOL/L (ref 7–19)
AST SERPL-CCNC: 20 U/L (ref 5–32)
BASOPHILS # BLD: 0.03 K/UL (ref 0–0.2)
BASOPHILS NFR BLD: 0.9 % (ref 0–1)
BILIRUB SERPL-MCNC: <0.2 MG/DL (ref 0–1.2)
BUN SERPL-MCNC: 14 MG/DL (ref 8–23)
CALCIUM SERPL-MCNC: 9.1 MG/DL (ref 8.8–10.2)
CHLORIDE SERPL-SCNC: 104 MMOL/L (ref 98–107)
CO2 SERPL-SCNC: 25 MMOL/L (ref 22–29)
CREAT SERPL-MCNC: 0.7 MG/DL (ref 0.5–0.9)
EOSINOPHIL # BLD: 0.2 K/UL (ref 0–0.6)
EOSINOPHIL NFR BLD: 5.8 % (ref 0–5)
ERYTHROCYTE [DISTWIDTH] IN BLOOD BY AUTOMATED COUNT: 12.8 % (ref 11.5–14.5)
FERRITIN SERPL-MCNC: 262 NG/ML (ref 13–150)
GLUCOSE SERPL-MCNC: 80 MG/DL (ref 70–99)
HCT VFR BLD AUTO: 29.7 % (ref 37–47)
HGB BLD-MCNC: 9.5 G/DL (ref 12–16)
IRON SATN MFR SERPL: 16 % (ref 15–50)
IRON SERPL-MCNC: 43 UG/DL (ref 37–145)
LYMPHOCYTES # BLD: 0.93 K/UL (ref 1.1–4.5)
LYMPHOCYTES NFR BLD: 26.8 % (ref 20–40)
MCH RBC QN AUTO: 30.2 PG (ref 27–31)
MCHC RBC AUTO-ENTMCNC: 32 G/DL (ref 33–37)
MCV RBC AUTO: 94.3 FL (ref 81–99)
MONOCYTES # BLD: 0.38 K/UL (ref 0–0.9)
MONOCYTES NFR BLD: 11 % (ref 1–10)
NEUTROPHILS # BLD: 1.92 K/UL (ref 1.5–7.5)
NEUTS SEG NFR BLD: 55.2 % (ref 50–65)
PLATELET # BLD AUTO: 194 K/UL (ref 130–400)
PMV BLD AUTO: 9.5 FL (ref 9.4–12.3)
POTASSIUM SERPL-SCNC: 4.1 MMOL/L (ref 3.5–5.1)
PROT SERPL-MCNC: 6.5 G/DL (ref 6.4–8.3)
RBC # BLD AUTO: 3.15 M/UL (ref 4.2–5.4)
SODIUM SERPL-SCNC: 139 MMOL/L (ref 136–145)
TIBC SERPL-MCNC: 264 UG/DL (ref 250–400)
WBC # BLD AUTO: 3.47 K/UL (ref 4.8–10.8)

## 2025-05-02 PROCEDURE — 99212 OFFICE O/P EST SF 10 MIN: CPT

## 2025-05-02 PROCEDURE — 1090F PRES/ABSN URINE INCON ASSESS: CPT | Performed by: NURSE PRACTITIONER

## 2025-05-02 PROCEDURE — 82728 ASSAY OF FERRITIN: CPT

## 2025-05-02 PROCEDURE — 36415 COLL VENOUS BLD VENIPUNCTURE: CPT

## 2025-05-02 PROCEDURE — 1036F TOBACCO NON-USER: CPT | Performed by: NURSE PRACTITIONER

## 2025-05-02 PROCEDURE — 80053 COMPREHEN METABOLIC PANEL: CPT

## 2025-05-02 PROCEDURE — 1123F ACP DISCUSS/DSCN MKR DOCD: CPT | Performed by: NURSE PRACTITIONER

## 2025-05-02 PROCEDURE — 3077F SYST BP >= 140 MM HG: CPT | Performed by: NURSE PRACTITIONER

## 2025-05-02 PROCEDURE — 83550 IRON BINDING TEST: CPT

## 2025-05-02 PROCEDURE — G8420 CALC BMI NORM PARAMETERS: HCPCS | Performed by: NURSE PRACTITIONER

## 2025-05-02 PROCEDURE — 3079F DIAST BP 80-89 MM HG: CPT | Performed by: NURSE PRACTITIONER

## 2025-05-02 PROCEDURE — G8400 PT W/DXA NO RESULTS DOC: HCPCS | Performed by: NURSE PRACTITIONER

## 2025-05-02 PROCEDURE — 83540 ASSAY OF IRON: CPT

## 2025-05-02 PROCEDURE — 85025 COMPLETE CBC W/AUTO DIFF WBC: CPT

## 2025-05-02 PROCEDURE — 1159F MED LIST DOCD IN RCRD: CPT | Performed by: NURSE PRACTITIONER

## 2025-05-02 PROCEDURE — 99214 OFFICE O/P EST MOD 30 MIN: CPT | Performed by: NURSE PRACTITIONER

## 2025-05-02 PROCEDURE — 1126F AMNT PAIN NOTED NONE PRSNT: CPT | Performed by: NURSE PRACTITIONER

## 2025-05-02 PROCEDURE — G8427 DOCREV CUR MEDS BY ELIG CLIN: HCPCS | Performed by: NURSE PRACTITIONER

## 2025-05-06 ENCOUNTER — RESULTS FOLLOW-UP (OUTPATIENT)
Dept: HEMATOLOGY | Age: 80
End: 2025-05-06

## 2025-05-08 ASSESSMENT — ENCOUNTER SYMPTOMS
RESPIRATORY NEGATIVE: 1
NAUSEA: 0
BACK PAIN: 0
SHORTNESS OF BREATH: 0
EYE DISCHARGE: 0
EYE PAIN: 0
BLOOD IN STOOL: 0
DIARRHEA: 0
ABDOMINAL PAIN: 0
SORE THROAT: 0
GASTROINTESTINAL NEGATIVE: 1
CONSTIPATION: 0
WHEEZING: 0
COUGH: 0
EYE REDNESS: 0
EYES NEGATIVE: 1

## 2025-05-30 ENCOUNTER — OFFICE VISIT (OUTPATIENT)
Dept: OBGYN CLINIC | Age: 80
End: 2025-05-30
Payer: MEDICARE

## 2025-05-30 VITALS
DIASTOLIC BLOOD PRESSURE: 75 MMHG | WEIGHT: 97 LBS | SYSTOLIC BLOOD PRESSURE: 180 MMHG | HEART RATE: 70 BPM | BODY MASS INDEX: 18.94 KG/M2

## 2025-05-30 DIAGNOSIS — N81.10 VAGINAL PROLAPSE: ICD-10-CM

## 2025-05-30 DIAGNOSIS — N81.11 CYSTOCELE, MIDLINE: ICD-10-CM

## 2025-05-30 DIAGNOSIS — N95.2 VAGINAL ATROPHY: ICD-10-CM

## 2025-05-30 DIAGNOSIS — Z46.89 PESSARY MAINTENANCE: Primary | ICD-10-CM

## 2025-05-30 PROCEDURE — 1159F MED LIST DOCD IN RCRD: CPT | Performed by: NURSE PRACTITIONER

## 2025-05-30 PROCEDURE — 1090F PRES/ABSN URINE INCON ASSESS: CPT | Performed by: NURSE PRACTITIONER

## 2025-05-30 PROCEDURE — 1123F ACP DISCUSS/DSCN MKR DOCD: CPT | Performed by: NURSE PRACTITIONER

## 2025-05-30 PROCEDURE — 3078F DIAST BP <80 MM HG: CPT | Performed by: NURSE PRACTITIONER

## 2025-05-30 PROCEDURE — G8420 CALC BMI NORM PARAMETERS: HCPCS | Performed by: NURSE PRACTITIONER

## 2025-05-30 PROCEDURE — G8427 DOCREV CUR MEDS BY ELIG CLIN: HCPCS | Performed by: NURSE PRACTITIONER

## 2025-05-30 PROCEDURE — 3077F SYST BP >= 140 MM HG: CPT | Performed by: NURSE PRACTITIONER

## 2025-05-30 PROCEDURE — G8400 PT W/DXA NO RESULTS DOC: HCPCS | Performed by: NURSE PRACTITIONER

## 2025-05-30 PROCEDURE — 1036F TOBACCO NON-USER: CPT | Performed by: NURSE PRACTITIONER

## 2025-05-30 PROCEDURE — 99213 OFFICE O/P EST LOW 20 MIN: CPT | Performed by: NURSE PRACTITIONER

## 2025-05-30 ASSESSMENT — ENCOUNTER SYMPTOMS
EYES NEGATIVE: 1
ALLERGIC/IMMUNOLOGIC NEGATIVE: 1
CONSTIPATION: 0
GASTROINTESTINAL NEGATIVE: 1
RESPIRATORY NEGATIVE: 1
DIARRHEA: 0

## 2025-05-30 NOTE — PROGRESS NOTES
Val Hager is a 80 y.o. female who presents today for her medical conditions/ complaints as noted below. Val Hager is c/o of Follow-up        HPI  Pt presents for 3 month pessary maintenance. Has been working well, no problems.     No LMP recorded. Patient has had a hysterectomy.  No obstetric history on file.    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    PONV (postoperative nausea and vomiting)     Presence of pessary     Sleep apnea     Systemic lupus erythematosus (HCC) 07/26/2019    Thrombocytopenia, unspecified 07/26/2019     Past Surgical History:   Procedure Laterality Date    APPENDECTOMY      BREAST SURGERY      COLONOSCOPY      FOOT SURGERY Right     HYSTERECTOMY (CERVIX STATUS UNKNOWN)      TOTAL KNEE ARTHROPLASTY Right 03/19/2025    ROBOTIC RIGHT TOTAL KNEE ARTHROPLASTY performed by Chalino Caballero MD at [facility] OR    TOTAL KNEE ARTHROPLASTY Right     TUBAL LIGATION       Family History   Problem Relation Age of Onset    Heart Disease Mother     Cancer Sister     Cancer Brother     Unknown Maternal Grandmother     Unknown Maternal Grandfather     Unknown Paternal Grandmother     Unknown Paternal Grandfather      Social History     Tobacco Use    Smoking status: Never    Smokeless tobacco: Never   Substance Use Topics    Alcohol use: Not Currently       Current Outpatient Medications   Medication Sig Dispense Refill    vitamin D (VITAMIN D3) 25 MCG (1000 UT) CAPS Take 1 capsule by mouth daily      ferrous sulfate (FEROSUL) 325 (65 Fe) MG tablet Take 1 tablet by mouth 2 times daily 60 tablet 5    eltrombopag (PROMACTA) 12.5 MG TABS tablet Take 1 tablet by mouth daily 30 tablet 11    lisinopril (PRINIVIL;ZESTRIL) 40 MG tablet Take 1 tablet by mouth daily      amLODIPine (NORVASC) 2.5 MG tablet Take 2 tablets by mouth daily (Patient taking differently: Take 1

## 2025-06-12 ENCOUNTER — OFFICE VISIT (OUTPATIENT)
Dept: CARDIOLOGY | Facility: CLINIC | Age: 80
End: 2025-06-12
Payer: MEDICARE

## 2025-06-12 VITALS
HEIGHT: 60 IN | DIASTOLIC BLOOD PRESSURE: 74 MMHG | HEART RATE: 81 BPM | SYSTOLIC BLOOD PRESSURE: 152 MMHG | BODY MASS INDEX: 19.04 KG/M2 | OXYGEN SATURATION: 98 % | WEIGHT: 97 LBS

## 2025-06-12 DIAGNOSIS — I10 ESSENTIAL HYPERTENSION: Primary | ICD-10-CM

## 2025-06-12 DIAGNOSIS — I27.20 PULMONARY HYPERTENSION: ICD-10-CM

## 2025-06-12 PROCEDURE — 1159F MED LIST DOCD IN RCRD: CPT | Performed by: NURSE PRACTITIONER

## 2025-06-12 PROCEDURE — 99214 OFFICE O/P EST MOD 30 MIN: CPT | Performed by: NURSE PRACTITIONER

## 2025-06-12 PROCEDURE — 3078F DIAST BP <80 MM HG: CPT | Performed by: NURSE PRACTITIONER

## 2025-06-12 PROCEDURE — 3077F SYST BP >= 140 MM HG: CPT | Performed by: NURSE PRACTITIONER

## 2025-06-12 PROCEDURE — 1160F RVW MEDS BY RX/DR IN RCRD: CPT | Performed by: NURSE PRACTITIONER

## 2025-06-12 PROCEDURE — 93000 ELECTROCARDIOGRAM COMPLETE: CPT | Performed by: NURSE PRACTITIONER

## 2025-06-12 RX ORDER — MUPIROCIN 20 MG/G
OINTMENT TOPICAL
COMMUNITY
Start: 2025-06-11

## 2025-06-12 NOTE — PROGRESS NOTES
Subjective:     Encounter Date:06/12/2025      Patient ID: Sarah Espinoza is a 80 y.o. female with hypertension and pulmonary hypertension.    Chief Complaint: no complaints  History of Present Illness  Patient presents today for management of hypertension. Today she denies any chest pain. She reports some dyspnea on exertion at times. She reports that she still does whatever she wants she just takes more breaks. She reports that her palpitations have been controlled with metoprolol. She denies any leg swelling, orthopnea or PND. She had a right TKR 3/2025 and is recovering well. She denies any dizziness or near syncope. She reports that her BP has been remaining controlled running 120/70's. Patient follows with Michelle IGLESIAS as PCP.     Previous Cardiac Testing and Procedures:  - Echo (12/21/2016) EF 55%, grade 1 diastolic dysfunction, RVSP 45-55 mmHg, normal RV size and function  - Nuclear stress (01/09/2017) normal myocardial perfusion with no evidence of ischemia, EF 70%  - Echo (5/17/2023) EF 61-65%, diastolic dysfunction, normal RV size and function, no significant valve dysfunction, unable to estimate RVSP due to insufficient TR jet  - Lipid panel (11/9/2023) total cholesterol 154, HDL 55, LDL 86, triglycerides 68   - Renal artery ultrasound (11/27/2023) no hemodynamically significant renal artery stenosis    The following portions of the patient's history were reviewed and updated as appropriate: allergies, current medications, past family history, past medical history, past social history, past surgical history and problem list.    Allergies   Allergen Reactions    Percodan [Oxycodone-Aspirin] Hallucinations    Zofran [Ondansetron Hcl]        Current Outpatient Medications:     amLODIPine (NORVASC) 5 MG tablet, Take 1 tablet by mouth Daily., Disp: 90 tablet, Rfl: 3    eltrombopag (PROMACTA) 75 MG tablet tablet, Take 12.5 mg by mouth Daily., Disp: , Rfl:     Ferrous Sulfate (IRON PO), Take  by  "Pt alert to self. VSS. Up with Ax1. Tele shows Afib with CVR, HR in 130s with activity, 60s while resting. Pt rested well all night. No behavior issues to note.     /64 (BP Location: Left arm)   Pulse 121   Temp 98.1  F (36.7  C) (Axillary)   Resp 16   Ht 1.88 m (6' 2\")   Wt 64.5 kg (142 lb 3.2 oz)   SpO2 96%   BMI 18.26 kg/m        " mouth., Disp: , Rfl:     hydroxychloroquine (PLAQUENIL) 200 MG tablet, Take  by mouth Daily., Disp: , Rfl:     lisinopril (PRINIVIL,ZESTRIL) 10 MG tablet, Take 1 tablet by mouth Daily., Disp: , Rfl:     metoprolol succinate XL (TOPROL-XL) 50 MG 24 hr tablet, Take 1.5 tablets by mouth Daily., Disp: 135 tablet, Rfl: 3    mupirocin (BACTROBAN) 2 % ointment, , Disp: , Rfl:   Past Medical History:   Diagnosis Date    Arrhythmia     Bruit     CAD (coronary artery disease)     Edema extremities     Fatigue     Frequent urination     HTN (hypertension)     Goal blood pressure less than 140/90 4/2016 bilateral renal US WNL    Hyperlipidemia     Mixed    Lupus     Palpitations     Platelet disorder     PONV (postoperative nausea and vomiting)     Sinusitis     SOB (shortness of breath)      Social History     Socioeconomic History    Marital status:    Tobacco Use    Smoking status: Never    Smokeless tobacco: Never   Vaping Use    Vaping status: Never Used   Substance and Sexual Activity    Alcohol use: No    Drug use: No    Sexual activity: Defer     Partners: Male     Birth control/protection: None       Review of Systems   Constitutional: Negative for malaise/fatigue.   Cardiovascular:  Positive for dyspnea on exertion. Negative for chest pain, irregular heartbeat, leg swelling, near-syncope, orthopnea, palpitations, paroxysmal nocturnal dyspnea and syncope.   Respiratory:  Negative for shortness of breath.    Hematologic/Lymphatic: Does not bruise/bleed easily.   Genitourinary:  Negative for hematuria.   Neurological:  Negative for dizziness and weakness.   All other systems reviewed and are negative.         Objective:     Vitals reviewed.   Constitutional:       General: Not in acute distress.     Appearance: Normal appearance. Well-developed.   Eyes:      Pupils: Pupils are equal, round, and reactive to light.   HENT:      Head: Normocephalic and atraumatic.      Nose: Nose normal.   Neck:      Vascular: No  "carotid bruit.   Pulmonary:      Effort: Pulmonary effort is normal. No respiratory distress.      Breath sounds: Normal breath sounds. No wheezing. No rales.   Cardiovascular:      Normal rate. Regular rhythm.      Murmurs: There is no murmur.   Edema:     Peripheral edema absent.   Abdominal:      General: There is no distension.      Palpations: Abdomen is soft.   Musculoskeletal: Normal range of motion.      Cervical back: Normal range of motion and neck supple. Skin:     General: Skin is warm.      Findings: No erythema or rash.   Neurological:      General: No focal deficit present.      Mental Status: Alert and oriented to person, place, and time.   Psychiatric:         Attention and Perception: Attention normal.         Mood and Affect: Mood normal.         Speech: Speech normal.         Behavior: Behavior normal.         Thought Content: Thought content normal.         Judgment: Judgment normal.         /74   Pulse 81   Ht 152.4 cm (60\")   Wt 44 kg (97 lb)   SpO2 98%   BMI 18.94 kg/m²       ECG 12 Lead    Date/Time: 6/12/2025 1:35 PM  Performed by: Conor Ji APRN    Authorized by: Conor Ji APRN  Comparison: compared with previous ECG from 3/27/2023  Similar to previous ECG  Rhythm: sinus rhythm  Rate: normal  BPM: 64    Clinical impression: normal ECG          Lab Review:       Results for orders placed in visit on 03/27/23    Adult Transthoracic Echo Complete W/ Cont if Necessary Per Protocol    Interpretation Summary    Left ventricular systolic function is normal. Left ventricular ejection fraction appears to be 61 - 65%.    Left ventricular diastolic dysfunction is noted.    Normal right ventricular cavity size and systolic function noted.    There is no significant (grade greater than mild) valvular dysfunction.    Insufficient TR velocity profile to estimate the right ventricular systolic pressure.    I have personally reviewed echo and past office notes prior to patients " visit  Assessment:          Diagnosis Plan   1. Essential hypertension        2. Pulmonary hypertension               Plan:       Hypertension: slightly elevated in office. Continue current medications. Recommend to continue to monitor routinely.     2. Pulmonary hypertension: echo from 2016 with RVSP 45-55 mm Hg. Lupus and Sjogrens disease felt to be likely etiologies. Echo on 5/17/2023 showed normal RV size and function with no significant valve dysfunction and RVSP was unable to be estimated due to inadequate TR jet. Despite inability to estimate RVSP, these findings suggest improvement in her pulmonary hypertension.     Advance Care Planning   ACP discussion was held with the patient during this visit. Patient does not have an advance directive, information provided.       I spent 35 minutes caring for [place] on this date of service. This time includes time spent by me in the following activities:preparing for the visit, reviewing tests, obtaining and/or reviewing a separately obtained history, performing a medically appropriate examination and/or evaluation , counseling and educating the patient/family/caregiver, and documenting information in the medical record     I spent 2 minutes on the separately reported service of EKG. This time is not included in the time used to support the E/M service also reported today.    Patient is to follow up in 1 year or sooner if needed

## 2025-07-31 ENCOUNTER — TELEPHONE (OUTPATIENT)
Dept: HEMATOLOGY | Age: 80
End: 2025-07-31

## 2025-07-31 NOTE — TELEPHONE ENCOUNTER
I called patient and left detailed voicemail about their appointment on 08/05/2025. I made patient aware not to arrive any earlier than the appointment time and to come at the time of the follow up not the time of the lab appointment if it is different than the follow up appt time. I also made patient aware to eat and drink plenty of water to hydrate properly before coming to these appointments because this will make their lab draw much easier incase labs are needed anytime throughout their visit. Made patient aware that we are now located at the [facility] at [address]. Located between [facility] and the [facility]. I also ask that if they are not able to make it to their appointment to please give us a call at [phone] to reschedule.

## 2025-08-04 DIAGNOSIS — D50.8 IRON DEFICIENCY ANEMIA SECONDARY TO INADEQUATE DIETARY IRON INTAKE: Primary | ICD-10-CM

## 2025-08-04 DIAGNOSIS — Z79.899 MEDICATION MANAGEMENT: ICD-10-CM

## 2025-08-05 ENCOUNTER — OFFICE VISIT (OUTPATIENT)
Dept: HEMATOLOGY | Age: 80
End: 2025-08-05
Payer: MEDICARE

## 2025-08-05 ENCOUNTER — HOSPITAL ENCOUNTER (OUTPATIENT)
Dept: INFUSION THERAPY | Age: 80
Discharge: HOME | End: 2025-08-05
Payer: MEDICARE

## 2025-08-05 VITALS
HEART RATE: 63 BPM | BODY MASS INDEX: 19.3 KG/M2 | SYSTOLIC BLOOD PRESSURE: 140 MMHG | DIASTOLIC BLOOD PRESSURE: 82 MMHG | HEIGHT: 60 IN | OXYGEN SATURATION: 100 % | WEIGHT: 98.3 LBS | TEMPERATURE: 98.1 F

## 2025-08-05 DIAGNOSIS — D50.8 IRON DEFICIENCY ANEMIA SECONDARY TO INADEQUATE DIETARY IRON INTAKE: ICD-10-CM

## 2025-08-05 DIAGNOSIS — Z79.899 MEDICATION MANAGEMENT: ICD-10-CM

## 2025-08-05 DIAGNOSIS — D69.3 CHRONIC ITP (IDIOPATHIC THROMBOCYTOPENIC PURPURA) (HCC): Primary | ICD-10-CM

## 2025-08-05 DIAGNOSIS — D69.3 CHRONIC ITP (IDIOPATHIC THROMBOCYTOPENIC PURPURA) (HCC): ICD-10-CM

## 2025-08-05 LAB
ALBUMIN SERPL-MCNC: 4.5 G/DL (ref 3.5–5.2)
ALP SERPL-CCNC: 87 U/L (ref 35–104)
ALT SERPL-CCNC: 12 U/L (ref 5–33)
ANION GAP SERPL CALCULATED.3IONS-SCNC: 13 MMOL/L (ref 7–19)
AST SERPL-CCNC: 22 U/L (ref 5–32)
BASOPHILS # BLD: 0.03 K/UL (ref 0–0.2)
BASOPHILS NFR BLD: 0.7 % (ref 0–1)
BILIRUB SERPL-MCNC: 0.3 MG/DL (ref 0–1.2)
BUN SERPL-MCNC: 18 MG/DL (ref 8–23)
CALCIUM SERPL-MCNC: 9.2 MG/DL (ref 8.8–10.2)
CHLORIDE SERPL-SCNC: 101 MMOL/L (ref 98–107)
CO2 SERPL-SCNC: 23 MMOL/L (ref 22–29)
CREAT SERPL-MCNC: 0.9 MG/DL (ref 0.5–0.9)
CRP SERPL-MCNC: <3 MG/L (ref 0–5)
EOSINOPHIL # BLD: 0.17 K/UL (ref 0–0.6)
EOSINOPHIL NFR BLD: 4.2 % (ref 0–5)
ERYTHROCYTE [DISTWIDTH] IN BLOOD BY AUTOMATED COUNT: 13.2 % (ref 11.5–14.5)
FERRITIN SERPL-MCNC: 209 NG/ML (ref 13–150)
GLUCOSE SERPL-MCNC: 103 MG/DL (ref 70–99)
HCT VFR BLD AUTO: 33.9 % (ref 37–47)
HGB BLD-MCNC: 11.2 G/DL (ref 12–16)
IRON SATN MFR SERPL: 23 % (ref 15–50)
IRON SERPL-MCNC: 60 UG/DL (ref 37–145)
LYMPHOCYTES # BLD: 1.03 K/UL (ref 1.1–4.5)
LYMPHOCYTES NFR BLD: 25.4 % (ref 20–40)
MCH RBC QN AUTO: 29.7 PG (ref 27–31)
MCHC RBC AUTO-ENTMCNC: 33 G/DL (ref 33–37)
MCV RBC AUTO: 89.9 FL (ref 81–99)
MONOCYTES # BLD: 0.39 K/UL (ref 0–0.9)
MONOCYTES NFR BLD: 9.6 % (ref 1–10)
NEUTROPHILS # BLD: 2.43 K/UL (ref 1.5–7.5)
NEUTS SEG NFR BLD: 59.9 % (ref 50–65)
PLATELET # BLD AUTO: 195 K/UL (ref 130–400)
PMV BLD AUTO: 9.1 FL (ref 9.4–12.3)
POTASSIUM SERPL-SCNC: 4.1 MMOL/L (ref 3.5–5.1)
PROT SERPL-MCNC: 6.9 G/DL (ref 6.4–8.3)
RBC # BLD AUTO: 3.77 M/UL (ref 4.2–5.4)
SODIUM SERPL-SCNC: 137 MMOL/L (ref 136–145)
TIBC SERPL-MCNC: 266 UG/DL (ref 250–400)
WBC # BLD AUTO: 4.06 K/UL (ref 4.8–10.8)

## 2025-08-05 PROCEDURE — 3077F SYST BP >= 140 MM HG: CPT | Performed by: NURSE PRACTITIONER

## 2025-08-05 PROCEDURE — 36415 COLL VENOUS BLD VENIPUNCTURE: CPT

## 2025-08-05 PROCEDURE — G8420 CALC BMI NORM PARAMETERS: HCPCS | Performed by: NURSE PRACTITIONER

## 2025-08-05 PROCEDURE — 99214 OFFICE O/P EST MOD 30 MIN: CPT | Performed by: NURSE PRACTITIONER

## 2025-08-05 PROCEDURE — 82784 ASSAY IGA/IGD/IGG/IGM EACH: CPT

## 2025-08-05 PROCEDURE — 84165 PROTEIN E-PHORESIS SERUM: CPT

## 2025-08-05 PROCEDURE — 84155 ASSAY OF PROTEIN SERUM: CPT

## 2025-08-05 PROCEDURE — 86334 IMMUNOFIX E-PHORESIS SERUM: CPT

## 2025-08-05 PROCEDURE — 86140 C-REACTIVE PROTEIN: CPT

## 2025-08-05 PROCEDURE — 83521 IG LIGHT CHAINS FREE EACH: CPT

## 2025-08-05 PROCEDURE — 1090F PRES/ABSN URINE INCON ASSESS: CPT | Performed by: NURSE PRACTITIONER

## 2025-08-05 PROCEDURE — 83540 ASSAY OF IRON: CPT

## 2025-08-05 PROCEDURE — 86160 COMPLEMENT ANTIGEN: CPT

## 2025-08-05 PROCEDURE — 3079F DIAST BP 80-89 MM HG: CPT | Performed by: NURSE PRACTITIONER

## 2025-08-05 PROCEDURE — 1159F MED LIST DOCD IN RCRD: CPT | Performed by: NURSE PRACTITIONER

## 2025-08-05 PROCEDURE — 82728 ASSAY OF FERRITIN: CPT

## 2025-08-05 PROCEDURE — 83550 IRON BINDING TEST: CPT

## 2025-08-05 PROCEDURE — 85025 COMPLETE CBC W/AUTO DIFF WBC: CPT

## 2025-08-05 PROCEDURE — G8427 DOCREV CUR MEDS BY ELIG CLIN: HCPCS | Performed by: NURSE PRACTITIONER

## 2025-08-05 PROCEDURE — 1126F AMNT PAIN NOTED NONE PRSNT: CPT | Performed by: NURSE PRACTITIONER

## 2025-08-05 PROCEDURE — 83883 ASSAY NEPHELOMETRY NOT SPEC: CPT

## 2025-08-05 PROCEDURE — G8400 PT W/DXA NO RESULTS DOC: HCPCS | Performed by: NURSE PRACTITIONER

## 2025-08-05 PROCEDURE — 1123F ACP DISCUSS/DSCN MKR DOCD: CPT | Performed by: NURSE PRACTITIONER

## 2025-08-05 PROCEDURE — 99213 OFFICE O/P EST LOW 20 MIN: CPT

## 2025-08-05 PROCEDURE — 1036F TOBACCO NON-USER: CPT | Performed by: NURSE PRACTITIONER

## 2025-08-05 PROCEDURE — 80053 COMPREHEN METABOLIC PANEL: CPT

## 2025-08-09 LAB
ALBUMIN SERPL-MCNC: 4.28 G/DL (ref 3.75–5.01)
ALPHA1 GLOB SERPL ELPH-MCNC: 0.28 G/DL (ref 0.19–0.46)
ALPHA2 GLOB SERPL ELPH-MCNC: 0.7 G/DL (ref 0.48–1.05)
B-GLOBULIN SERPL ELPH-MCNC: 0.65 G/DL (ref 0.48–1.1)
DEPRECATED KAPPA LC FREE/LAMBDA SER: 1.05 {RATIO} (ref 0.26–1.65)
EER MONOCLONAL PROTEIN AND FLC, SERUM: ABNORMAL
GAMMA GLOB SERPL ELPH-MCNC: 0.69 G/DL (ref 0.62–1.51)
IGA SERPL-MCNC: 161 MG/DL (ref 68–408)
IGG SERPL-MCNC: 562 MG/DL (ref 768–1632)
IGM SERPL-MCNC: 288 MG/DL (ref 35–263)
INTERPRETATION SERPL IFE-IMP: ABNORMAL
INTERPRETATION SERPL IFE-IMP: ABNORMAL
KAPPA LC FREE SER-MCNC: 15.34 MG/L (ref 3.3–19.4)
LAMBDA LC FREE SERPL-MCNC: 14.64 MG/L (ref 5.71–26.3)
MONOCLONAL PROTEIN, SERUM: ABNORMAL G/DL
PROT SERPL-MCNC: 6.6 G/DL (ref 6.3–8.2)

## 2025-09-04 ENCOUNTER — OFFICE VISIT (OUTPATIENT)
Dept: OBGYN CLINIC | Age: 80
End: 2025-09-04
Payer: MEDICARE

## 2025-09-04 VITALS
WEIGHT: 97 LBS | HEART RATE: 75 BPM | SYSTOLIC BLOOD PRESSURE: 148 MMHG | DIASTOLIC BLOOD PRESSURE: 73 MMHG | BODY MASS INDEX: 18.94 KG/M2

## 2025-09-04 DIAGNOSIS — Z46.89 PESSARY MAINTENANCE: Primary | ICD-10-CM

## 2025-09-04 DIAGNOSIS — N81.10 VAGINAL PROLAPSE: ICD-10-CM

## 2025-09-04 DIAGNOSIS — N81.11 CYSTOCELE, MIDLINE: ICD-10-CM

## 2025-09-04 DIAGNOSIS — N95.2 VAGINAL ATROPHY: ICD-10-CM

## 2025-09-04 PROCEDURE — G8427 DOCREV CUR MEDS BY ELIG CLIN: HCPCS | Performed by: NURSE PRACTITIONER

## 2025-09-04 PROCEDURE — G8400 PT W/DXA NO RESULTS DOC: HCPCS | Performed by: NURSE PRACTITIONER

## 2025-09-04 PROCEDURE — 1036F TOBACCO NON-USER: CPT | Performed by: NURSE PRACTITIONER

## 2025-09-04 PROCEDURE — G8420 CALC BMI NORM PARAMETERS: HCPCS | Performed by: NURSE PRACTITIONER

## 2025-09-04 PROCEDURE — 3077F SYST BP >= 140 MM HG: CPT | Performed by: NURSE PRACTITIONER

## 2025-09-04 PROCEDURE — 1090F PRES/ABSN URINE INCON ASSESS: CPT | Performed by: NURSE PRACTITIONER

## 2025-09-04 PROCEDURE — 3078F DIAST BP <80 MM HG: CPT | Performed by: NURSE PRACTITIONER

## 2025-09-04 PROCEDURE — 99213 OFFICE O/P EST LOW 20 MIN: CPT | Performed by: NURSE PRACTITIONER

## 2025-09-04 PROCEDURE — 1160F RVW MEDS BY RX/DR IN RCRD: CPT | Performed by: NURSE PRACTITIONER

## 2025-09-04 PROCEDURE — 1123F ACP DISCUSS/DSCN MKR DOCD: CPT | Performed by: NURSE PRACTITIONER

## 2025-09-04 PROCEDURE — 1159F MED LIST DOCD IN RCRD: CPT | Performed by: NURSE PRACTITIONER

## 2025-09-04 ASSESSMENT — ENCOUNTER SYMPTOMS
GASTROINTESTINAL NEGATIVE: 1
CONSTIPATION: 0
EYES NEGATIVE: 1
RESPIRATORY NEGATIVE: 1
ALLERGIC/IMMUNOLOGIC NEGATIVE: 1
DIARRHEA: 0

## (undated) DEVICE — HANDPIECE SET WITH COAXIAL MULTI-ORIFICE TIP AND SUCTION TUBE: Brand: INTERPULSE

## (undated) DEVICE — SUTURE VICRYL + SZ 1 L18IN ABSRB UD L36MM CT-1 1/2 CIR VCP841D

## (undated) DEVICE — Device: Brand: DEFENDO AIR/WATER/SUCTION AND BIOPSY VALVE

## (undated) DEVICE — DRAPE ROSA RBTC UNT 20 DROP

## (undated) DEVICE — TBG SMPL FLTR LINE NASL 02/C02 A/ BX/100

## (undated) DEVICE — NEPTUNE E-SEP SMOKE EVACUATION PENCIL, COATED, 70MM BLADE, ROCKER SWITCH: Brand: NEPTUNE E-SEP

## (undated) DEVICE — ENDOGATOR AUXILIARY WATER JET CONNECTOR: Brand: ENDOGATOR

## (undated) DEVICE — DUAL CUT SAGITTAL BLADE

## (undated) DEVICE — 3M™ STERI-DRAPE™ INSTRUMENT POUCH 1018: Brand: STERI-DRAPE™

## (undated) DEVICE — CURAVIEW LED LARYNGOSCOPE BLADE & HANDLE,DISPOSABLE,MAC 3.5: Brand: CURAPLEX

## (undated) DEVICE — TOTAL TRAY, 16FR 10ML SIL FOLEY, URN: Brand: MEDLINE

## (undated) DEVICE — Device

## (undated) DEVICE — PIN FIX STERILE L80MM DIA3.2MM FLUT CAS

## (undated) DEVICE — OPTIFOAM GENTLE SA, POSTOP, 4X12: Brand: MEDLINE

## (undated) DEVICE — THE CHANNEL CLEANING BRUSH IS A NYLON FLEXI BRUSH ATTACHED TO A FLEXIBLE PLASTIC SHEATH DESIGNED TO SAFELY REMOVE DEBRIS FROM FLEXIBLE ENDOSCOPES.

## (undated) DEVICE — GLOVE SURG SZ 75 CRM LTX FREE POLYISOPRENE POLYMER BEAD ANTI

## (undated) DEVICE — SENSR O2 OXIMAX FNGR A/ 18IN NONSTR

## (undated) DEVICE — PIN FIX STERILE L150MM DIA3.2MM FLUT

## (undated) DEVICE — CEMENT MIXING SYSTEM WITH FEMORAL BREAKWAY NOZZLE: Brand: REVOLUTION

## (undated) DEVICE — SHEET,DRAPE,53X77,STERILE: Brand: MEDLINE

## (undated) DEVICE — GLOVE SURG SZ 85 CRM LTX FREE POLYISOPRENE POLYMER BEAD ANTI

## (undated) DEVICE — SOLUTION WND IRRIGATION 32 OZ 0.24 HYPOCHLOROUS ACD

## (undated) DEVICE — SUTURE VICRYL + SZ 2-0 L36IN ABSRB UD L36MM CT-1 1/2 CIR VCP945H

## (undated) DEVICE — SOLUTION IRRIG 3000ML 0.9% SOD CHL USP UROMATIC PLAS CONT

## (undated) DEVICE — BANDAGE COMPR W6INXL10YD ST M E WHITE/BEIGE

## (undated) DEVICE — MSK O2 MD CONCENTR A/ LF 7FT 1P/U

## (undated) DEVICE — INSTRUMENT KIT ORTHOPEDIC KNEE NAVITRACK

## (undated) DEVICE — ZIMMER® STERILE DISPOSABLE TOURNIQUET CUFF WITH PLC, DUAL PORT, SINGLE BLADDER, 34 IN. (86 CM)

## (undated) DEVICE — SUTURE VICRYL + 3-0 L27IN ABSRB UD PS-2 L19MM 3/8 CIR PRIM VCP427H

## (undated) DEVICE — GLOVE SURG SZ 85 L12IN FNGR THK79MIL GRN LTX FREE

## (undated) DEVICE — UNDERGLOVE SURG SZ 8 FNGR THK0.21MIL GRN LTX BEAD CUF

## (undated) DEVICE — ADHESIVE SKIN CLOSURE WND 8.661X1.5 IN 22 CM LIQUIBAND SECUR

## (undated) DEVICE — ELECTRODE ELECSURG L 10.2 CM PTFE COAT MONOPOLAR BLADE NSTK

## (undated) DEVICE — TUBE ET 6.5MM NSL ORAL BASIC CUF INTMED MURPHY EYE RADPQ